# Patient Record
Sex: MALE | Race: WHITE | NOT HISPANIC OR LATINO | Employment: OTHER | URBAN - NONMETROPOLITAN AREA
[De-identification: names, ages, dates, MRNs, and addresses within clinical notes are randomized per-mention and may not be internally consistent; named-entity substitution may affect disease eponyms.]

---

## 2019-06-26 ENCOUNTER — OFFICE VISIT (OUTPATIENT)
Dept: INTERNAL MEDICINE | Facility: CLINIC | Age: 79
End: 2019-06-26

## 2019-06-26 VITALS
HEIGHT: 70 IN | HEART RATE: 63 BPM | DIASTOLIC BLOOD PRESSURE: 72 MMHG | TEMPERATURE: 98.3 F | OXYGEN SATURATION: 98 % | RESPIRATION RATE: 16 BRPM | BODY MASS INDEX: 27.92 KG/M2 | WEIGHT: 195 LBS | SYSTOLIC BLOOD PRESSURE: 126 MMHG

## 2019-06-26 DIAGNOSIS — E78.2 MIXED HYPERLIPIDEMIA: ICD-10-CM

## 2019-06-26 DIAGNOSIS — M54.50 LUMBOSACRAL PAIN: ICD-10-CM

## 2019-06-26 DIAGNOSIS — M25.50 ARTHRALGIA OF MULTIPLE JOINTS: ICD-10-CM

## 2019-06-26 DIAGNOSIS — I10 BENIGN ESSENTIAL HYPERTENSION: Primary | ICD-10-CM

## 2019-06-26 PROCEDURE — 99204 OFFICE O/P NEW MOD 45 MIN: CPT | Performed by: INTERNAL MEDICINE

## 2019-06-26 RX ORDER — SIMVASTATIN 40 MG
0.5 TABLET ORAL DAILY
COMMUNITY
Start: 2019-04-04 | End: 2019-12-04 | Stop reason: SDUPTHER

## 2019-06-26 RX ORDER — LISINOPRIL 40 MG/1
1 TABLET ORAL DAILY
COMMUNITY
Start: 2019-06-14 | End: 2019-12-18 | Stop reason: SDUPTHER

## 2019-06-26 NOTE — PROGRESS NOTES
Subjective   Ted Saldivar is a 79 y.o. male.     Chief Complaint   Patient presents with   • Establish Care     refill medications   • Hypertension   • Hyperlipidemia   • Joint Pain   • Back Pain       History of Present Illness   HPI: Patient is here for an initial visit, he is also here to follow up on the blood pressure  The patient is taking the blood pressure medications as prescribed and has had no side effects. The patient is also here to follow up on the cholesterol and is trying to follow a diet. The patient is   due to get lab work done .  The patient also needs refills on medications .  Patient complains of chronic pain in his hands which progressively gets worse during the day, he also complains of morning stiffness. he also complains of pain which is chronic in his lower back area  Hyperlipidemia   Pertinent negatives include no chest pain or shortness of breath.   Hypertension   Pertinent negatives include no chest pain, palpitations or shortness of breath.      Review of Systems   Constitutional: Negative for appetite change, fatigue and fever.   HENT: Negative for congestion, ear discharge, ear pain, sinus pressure and sore throat.    Eyes: Negative for pain and discharge.   Respiratory: Negative for cough, shortness of breath and wheezing.    Cardiovascular: Negative for chest pain, palpitations and leg swelling.   Gastrointestinal: Negative for abdominal pain, constipation, diarrhea, nausea and vomiting.   Endocrine: Negative for cold intolerance and heat intolerance.   Genitourinary: Negative for dysuria and flank pain.   Musculoskeletal: Positive for arthralgias and back pain. Negative for joint swelling.   Skin: Negative for pallor and rash.   Allergic/Immunologic: Negative for environmental allergies and food allergies.   Neurological: Negative for dizziness, weakness and numbness.   Hematological: Negative for adenopathy. Does not bruise/bleed easily.   Psychiatric/Behavioral: Negative for  "behavioral problems and dysphoric mood. The patient is not nervous/anxious.        Past Medical History:   Diagnosis Date   • Arthritis    • Hyperlipidemia    • Hypertension        Past Surgical History:   Procedure Laterality Date   • HERNIA REPAIR Bilateral 1983   • ROTATOR CUFF REPAIR Right 2006   • TOTAL HIP ARTHROPLASTY Left 11/2018       Family History   Problem Relation Age of Onset   • Cancer Mother         lung   • Cancer Sister         leukemia        reports that he has never smoked. He has never used smokeless tobacco. He reports that he drinks alcohol. He reports that he does not use drugs.    No Known Allergies        Current Outpatient Medications:   •  lisinopril (PRINIVIL,ZESTRIL) 40 MG tablet, Take 1 tablet by mouth Daily., Disp: , Rfl:   •  simvastatin (ZOCOR) 40 MG tablet, Take 0.5 tablets by mouth Daily., Disp: , Rfl:       Objective   Blood pressure 126/72, pulse 63, temperature 98.3 °F (36.8 °C), resp. rate 16, height 177.8 cm (70\"), weight 88.5 kg (195 lb), SpO2 98 %.    Physical Exam   Constitutional: He is oriented to person, place, and time. He appears well-developed and well-nourished. No distress.   HENT:   Head: Normocephalic and atraumatic.   Right Ear: External ear normal.   Left Ear: External ear normal.   Nose: Nose normal.   Mouth/Throat: Oropharynx is clear and moist.   Eyes: Conjunctivae and EOM are normal. Pupils are equal, round, and reactive to light.   Neck: Normal range of motion. Neck supple. No thyromegaly present.   Cardiovascular: Normal rate, regular rhythm and normal heart sounds.   Pulmonary/Chest: Effort normal. No respiratory distress.   Abdominal: Soft. He exhibits no distension. There is no tenderness. There is no guarding.   Musculoskeletal: He exhibits tenderness and deformity. He exhibits no edema.   Lumbar pain   Lymphadenopathy:     He has no cervical adenopathy.   Neurological: He is alert and oriented to person, place, and time.   No gross motor or sensory " deficits   Skin: Skin is warm and dry. He is not diaphoretic. No erythema.   Psychiatric: He has a normal mood and affect.   Nursing note and vitals reviewed.      Patient's Body mass index is 27.98 kg/m². BMI is within normal parameters. No follow-up required..      No results found for this or any previous visit.      Assessment/Plan   Ted was seen today for establish care, hypertension, hyperlipidemia, joint pain and back pain.    Diagnoses and all orders for this visit:    Benign essential hypertension    Mixed hyperlipidemia  -     CBC & Differential  -     Comprehensive Metabolic Panel  -     Lipid Panel    Arthralgia of multiple joints  -     Ambulatory Referral to Rheumatology    Lumbosacral pain        Plan:  1.  Benign essential hypertension: Will continue current medication, low-sodium diet advised  2.mixed hyperlipidemia: will obtain   fasting CMP and lipid panel.  Diet and exercise counseled,  Will continue current medications  3.  multiple joint pain : Refer patient to rheumatology  4. Lumbosacral pain : We will monitor       Sheila Natarajan MD

## 2019-07-01 ENCOUNTER — OFFICE VISIT (OUTPATIENT)
Dept: INTERNAL MEDICINE | Facility: CLINIC | Age: 79
End: 2019-07-01

## 2019-07-01 ENCOUNTER — HOSPITAL ENCOUNTER (OUTPATIENT)
Dept: ULTRASOUND IMAGING | Facility: HOSPITAL | Age: 79
Discharge: HOME | End: 2019-07-01
Admitting: PHYSICIAN ASSISTANT

## 2019-07-01 VITALS
HEART RATE: 73 BPM | OXYGEN SATURATION: 97 % | WEIGHT: 194 LBS | HEIGHT: 70 IN | TEMPERATURE: 98.6 F | SYSTOLIC BLOOD PRESSURE: 136 MMHG | DIASTOLIC BLOOD PRESSURE: 80 MMHG | BODY MASS INDEX: 27.77 KG/M2

## 2019-07-01 DIAGNOSIS — M25.562 MEDIAL KNEE PAIN, LEFT: Primary | ICD-10-CM

## 2019-07-01 DIAGNOSIS — M25.562 MEDIAL KNEE PAIN, LEFT: ICD-10-CM

## 2019-07-01 DIAGNOSIS — M25.562 POSTERIOR LEFT KNEE PAIN: ICD-10-CM

## 2019-07-01 DIAGNOSIS — M25.562 POSTERIOR LEFT KNEE PAIN: Primary | ICD-10-CM

## 2019-07-01 DIAGNOSIS — R60.0 LOWER LEG EDEMA: ICD-10-CM

## 2019-07-01 PROBLEM — D49.2 NEOPLASM OF SKIN: Status: ACTIVE | Noted: 2018-04-18

## 2019-07-01 LAB
ALBUMIN SERPL-MCNC: 4.2 G/DL (ref 3.5–5)
ALBUMIN/GLOB SERPL: 1.4 G/DL (ref 1–2)
ALP SERPL-CCNC: 71 U/L (ref 38–126)
ALT SERPL-CCNC: 25 U/L (ref 13–69)
AST SERPL-CCNC: 22 U/L (ref 15–46)
BASOPHILS # BLD AUTO: 0.05 10*3/MM3 (ref 0–0.2)
BASOPHILS NFR BLD AUTO: 0.7 % (ref 0–1.5)
BILIRUB SERPL-MCNC: 0.6 MG/DL (ref 0.2–1.3)
BUN SERPL-MCNC: 22 MG/DL (ref 7–20)
BUN/CREAT SERPL: 24.4 (ref 6.3–21.9)
CALCIUM SERPL-MCNC: 9.4 MG/DL (ref 8.4–10.2)
CHLORIDE SERPL-SCNC: 103 MMOL/L (ref 98–107)
CHOLEST SERPL-MCNC: 161 MG/DL (ref 0–199)
CO2 SERPL-SCNC: 30 MMOL/L (ref 26–30)
CREAT SERPL-MCNC: 0.9 MG/DL (ref 0.6–1.3)
EOSINOPHIL # BLD AUTO: 0.14 10*3/MM3 (ref 0–0.4)
EOSINOPHIL NFR BLD AUTO: 2 % (ref 0.3–6.2)
ERYTHROCYTE [DISTWIDTH] IN BLOOD BY AUTOMATED COUNT: 14.8 % (ref 12.3–15.4)
GLOBULIN SER CALC-MCNC: 3 GM/DL
GLUCOSE SERPL-MCNC: 92 MG/DL (ref 74–98)
HCT VFR BLD AUTO: 40.9 % (ref 37.5–51)
HDLC SERPL-MCNC: 48 MG/DL (ref 40–60)
HGB BLD-MCNC: 13.6 G/DL (ref 13–17.7)
IMM GRANULOCYTES # BLD AUTO: 0.04 10*3/MM3 (ref 0–0.05)
IMM GRANULOCYTES NFR BLD AUTO: 0.6 % (ref 0–0.5)
LDLC SERPL CALC-MCNC: 96 MG/DL (ref 0–99)
LYMPHOCYTES # BLD AUTO: 1.45 10*3/MM3 (ref 0.7–3.1)
LYMPHOCYTES NFR BLD AUTO: 21 % (ref 19.6–45.3)
MCH RBC QN AUTO: 31.3 PG (ref 26.6–33)
MCHC RBC AUTO-ENTMCNC: 33.3 G/DL (ref 31.5–35.7)
MCV RBC AUTO: 94.2 FL (ref 79–97)
MONOCYTES # BLD AUTO: 0.61 10*3/MM3 (ref 0.1–0.9)
MONOCYTES NFR BLD AUTO: 8.8 % (ref 5–12)
NEUTROPHILS # BLD AUTO: 4.61 10*3/MM3 (ref 1.7–7)
NEUTROPHILS NFR BLD AUTO: 66.9 % (ref 42.7–76)
NRBC BLD AUTO-RTO: 0 /100 WBC (ref 0–0.2)
PLATELET # BLD AUTO: 256 10*3/MM3 (ref 140–450)
POTASSIUM SERPL-SCNC: 5.4 MMOL/L (ref 3.5–5.1)
PROT SERPL-MCNC: 7.2 G/DL (ref 6.3–8.2)
RBC # BLD AUTO: 4.34 10*6/MM3 (ref 4.14–5.8)
SODIUM SERPL-SCNC: 141 MMOL/L (ref 137–145)
TRIGL SERPL-MCNC: 87 MG/DL
VLDLC SERPL CALC-MCNC: 17.4 MG/DL
WBC # BLD AUTO: 6.9 10*3/MM3 (ref 3.4–10.8)

## 2019-07-01 PROCEDURE — 99213 OFFICE O/P EST LOW 20 MIN: CPT | Performed by: PHYSICIAN ASSISTANT

## 2019-07-01 PROCEDURE — 93971 EXTREMITY STUDY: CPT

## 2019-07-01 NOTE — PROGRESS NOTES
Ted Saldivar is a 79 y.o. male.     Subjective   History of Present Illness   Here today for concern of acute left knee pain for the last week.  No known injury, he just began hurting when he stood up from the table when the evening.  The pain got a little better after a few days then worsened again without known cause.  He endorses some swelling, redness and warmth.  The pain is worse medially but also bothersome posteriorly.  The knee hurts to bend which triggers sharp and stabbing pain.   Pain goes away for a few minutes if he is still but returns quickly.  No history of gout.  Pain is interrupting sleep.  No previous similar issues.  Tylenol does not help.   Ice helped a little for a few minutes.  No recent illness, fever or chills.  No lower leg swelling.  He returned from driving to [place] around 1 week ago at which time they stopped every 2 hours to walk around.  No CP or SOA.  No history of DVT or PE.  No anticoagulation.           The following portions of the patient's history were reviewed and updated as appropriate: allergies, current medications, past family history, past medical history, past social history, past surgical history and problem list.    Review of Systems   Constitutional: Negative for appetite change, chills, diaphoresis, fatigue, fever and unexpected weight change.   Eyes: Negative for visual disturbance.   Respiratory: Negative for cough, chest tightness, shortness of breath, wheezing and stridor.    Cardiovascular: Negative for chest pain, palpitations and leg swelling.   Musculoskeletal: Positive for arthralgias, gait problem and joint swelling. Negative for back pain and neck pain.   Skin: Positive for color change. Negative for pallor, rash and wound.        Abnormal warmth.   Neurological: Negative for dizziness, tremors, seizures, speech difficulty and headaches.   Hematological: Negative for adenopathy. Does not bruise/bleed easily.   Psychiatric/Behavioral: Positive for sleep  "disturbance. Negative for agitation, confusion, dysphoric mood, self-injury and suicidal ideas. The patient is not nervous/anxious.          Objective    Physical Exam   Musculoskeletal:        Left knee: He exhibits decreased range of motion, swelling and erythema. He exhibits no effusion, no ecchymosis, no deformity, no laceration, normal alignment, no LCL laxity, normal patellar mobility, no bony tenderness, normal meniscus and no MCL laxity. Tenderness found. Medial joint line tenderness noted. No lateral joint line, no MCL, no LCL and no patellar tendon tenderness noted.        Left upper leg: He exhibits no tenderness.        Left lower leg: He exhibits tenderness.        Legs:        /80   Pulse 73   Temp 98.6 °F (37 °C)   Ht 177.8 cm (70\")   Wt 88 kg (194 lb)   SpO2 97%   BMI 27.84 kg/m²     Nursing note and vitals reviewed.          Assessment/Plan   Ted was seen today for knee pain.    Diagnoses and all orders for this visit:    Posterior left knee pain  -     US venous doppler lower extremity left (duplex)    Lower leg edema  -     US venous doppler lower extremity left (duplex)    Medial knee pain, left  -     US venous doppler lower extremity left (duplex)      Will first rule out DVT with imaging. If negative, he has been advised to try Aleve or ibuprofen prn with food.  May refer to orthopedic surgery for consultation.         "

## 2019-07-02 ENCOUNTER — HOSPITAL ENCOUNTER (OUTPATIENT)
Dept: ULTRASOUND IMAGING | Facility: HOSPITAL | Age: 79
End: 2019-07-02

## 2019-07-11 ENCOUNTER — HOSPITAL ENCOUNTER (OUTPATIENT)
Dept: GENERAL RADIOLOGY | Facility: HOSPITAL | Age: 79
Discharge: HOME | End: 2019-07-11
Admitting: INTERNAL MEDICINE

## 2019-07-11 ENCOUNTER — HOSPITAL ENCOUNTER (OUTPATIENT)
Dept: GENERAL RADIOLOGY | Facility: HOSPITAL | Age: 79
Discharge: HOME | End: 2019-07-11

## 2019-07-11 ENCOUNTER — TRANSCRIBE ORDERS (OUTPATIENT)
Dept: GENERAL RADIOLOGY | Facility: HOSPITAL | Age: 79
End: 2019-07-11

## 2019-07-11 DIAGNOSIS — M25.561 PAIN IN BOTH KNEES, UNSPECIFIED CHRONICITY: ICD-10-CM

## 2019-07-11 DIAGNOSIS — M13.0 POLYARTHRITIS: ICD-10-CM

## 2019-07-11 DIAGNOSIS — M79.642 PAIN IN BOTH HANDS: ICD-10-CM

## 2019-07-11 DIAGNOSIS — M54.5 LOW BACK PAIN, UNSPECIFIED BACK PAIN LATERALITY, UNSPECIFIED CHRONICITY, WITH SCIATICA PRESENCE UNSPECIFIED: ICD-10-CM

## 2019-07-11 DIAGNOSIS — M79.641 PAIN IN BOTH HANDS: ICD-10-CM

## 2019-07-11 DIAGNOSIS — M15.0 PRIMARY GENERALIZED (OSTEO)ARTHRITIS: ICD-10-CM

## 2019-07-11 DIAGNOSIS — M54.5 LOW BACK PAIN, UNSPECIFIED BACK PAIN LATERALITY, UNSPECIFIED CHRONICITY, WITH SCIATICA PRESENCE UNSPECIFIED: Primary | ICD-10-CM

## 2019-07-11 DIAGNOSIS — M25.562 PAIN IN BOTH KNEES, UNSPECIFIED CHRONICITY: ICD-10-CM

## 2019-07-11 PROCEDURE — 73120 X-RAY EXAM OF HAND: CPT

## 2019-07-11 PROCEDURE — 73562 X-RAY EXAM OF KNEE 3: CPT

## 2019-08-22 ENCOUNTER — TELEPHONE (OUTPATIENT)
Dept: INTERNAL MEDICINE | Facility: CLINIC | Age: 79
End: 2019-08-22

## 2019-08-22 DIAGNOSIS — M25.50 ARTHRALGIA OF MULTIPLE JOINTS: Primary | ICD-10-CM

## 2019-08-22 NOTE — TELEPHONE ENCOUNTER
We received a voicemail message in referrals asking about getting a new referral to Rheumatology.  Patient would like to see Dr. Portillo at the [facility].  There is a previous referral to Rheumatology, but it was used to schedule with Dr. Esparza, which patient did attend that appointment.

## 2019-10-31 ENCOUNTER — OFFICE VISIT (OUTPATIENT)
Dept: INTERNAL MEDICINE | Facility: CLINIC | Age: 79
End: 2019-10-31

## 2019-10-31 VITALS
HEIGHT: 70 IN | OXYGEN SATURATION: 98 % | DIASTOLIC BLOOD PRESSURE: 84 MMHG | HEART RATE: 57 BPM | RESPIRATION RATE: 16 BRPM | WEIGHT: 195 LBS | BODY MASS INDEX: 27.92 KG/M2 | SYSTOLIC BLOOD PRESSURE: 147 MMHG | TEMPERATURE: 98.4 F

## 2019-10-31 DIAGNOSIS — E78.2 MIXED HYPERLIPIDEMIA: ICD-10-CM

## 2019-10-31 DIAGNOSIS — M25.50 ARTHRALGIA OF MULTIPLE JOINTS: ICD-10-CM

## 2019-10-31 DIAGNOSIS — I10 BENIGN ESSENTIAL HYPERTENSION: Primary | ICD-10-CM

## 2019-10-31 PROCEDURE — 99214 OFFICE O/P EST MOD 30 MIN: CPT | Performed by: INTERNAL MEDICINE

## 2019-10-31 RX ORDER — FOLIC ACID 1 MG/1
1 TABLET ORAL DAILY
COMMUNITY
End: 2020-07-17 | Stop reason: SDUPTHER

## 2019-10-31 RX ORDER — MECLIZINE HYDROCHLORIDE 25 MG/1
25 TABLET ORAL 3 TIMES DAILY PRN
Qty: 90 TABLET | Refills: 1 | Status: SHIPPED | OUTPATIENT
Start: 2019-10-31

## 2019-11-05 LAB
ALBUMIN SERPL-MCNC: 4.3 G/DL (ref 3.5–5.2)
ALBUMIN/GLOB SERPL: 1.7 G/DL
ALP SERPL-CCNC: 81 U/L (ref 39–117)
ALT SERPL-CCNC: 12 U/L (ref 1–41)
AST SERPL-CCNC: 16 U/L (ref 1–40)
BASOPHILS # BLD AUTO: (no result) 10*3/UL
BASOPHILS # BLD MANUAL: 0.21 10*3/MM3 (ref 0–0.2)
BASOPHILS NFR BLD MANUAL: 3 % (ref 0–1.5)
BILIRUB SERPL-MCNC: 0.6 MG/DL (ref 0.2–1.2)
BUN SERPL-MCNC: 28 MG/DL (ref 8–23)
BUN/CREAT SERPL: 26.2 (ref 7–25)
CALCIUM SERPL-MCNC: 9.3 MG/DL (ref 8.6–10.5)
CHLORIDE SERPL-SCNC: 106 MMOL/L (ref 98–107)
CHOLEST SERPL-MCNC: 153 MG/DL (ref 0–200)
CO2 SERPL-SCNC: 27.6 MMOL/L (ref 22–29)
CREAT SERPL-MCNC: 1.07 MG/DL (ref 0.76–1.27)
DIFFERENTIAL COMMENT: ABNORMAL
EOSINOPHIL # BLD AUTO: (no result) 10*3/UL
EOSINOPHIL # BLD MANUAL: 0.14 10*3/MM3 (ref 0–0.4)
EOSINOPHIL NFR BLD AUTO: (no result) %
EOSINOPHIL NFR BLD MANUAL: 2 % (ref 0.3–6.2)
ERYTHROCYTE [DISTWIDTH] IN BLOOD BY AUTOMATED COUNT: 14.5 % (ref 12.3–15.4)
GLOBULIN SER CALC-MCNC: 2.6 GM/DL
GLUCOSE SERPL-MCNC: 93 MG/DL (ref 65–99)
HCT VFR BLD AUTO: 41.5 % (ref 37.5–51)
HDLC SERPL-MCNC: 47 MG/DL (ref 40–60)
HGB BLD-MCNC: 13.2 G/DL (ref 13–17.7)
LDLC SERPL CALC-MCNC: 92 MG/DL (ref 0–100)
LYMPHOCYTES # BLD AUTO: (no result) 10*3/UL
LYMPHOCYTES # BLD MANUAL: 1.23 10*3/MM3 (ref 0.7–3.1)
LYMPHOCYTES NFR BLD AUTO: (no result) %
LYMPHOCYTES NFR BLD MANUAL: 18 % (ref 19.6–45.3)
MCH RBC QN AUTO: 30.5 PG (ref 26.6–33)
MCHC RBC AUTO-ENTMCNC: 31.8 G/DL (ref 31.5–35.7)
MCV RBC AUTO: 95.8 FL (ref 79–97)
MONOCYTES # BLD MANUAL: 0.14 10*3/MM3 (ref 0.1–0.9)
MONOCYTES NFR BLD AUTO: (no result) %
MONOCYTES NFR BLD MANUAL: 2 % (ref 5–12)
NEUTROPHILS # BLD MANUAL: 5.13 10*3/MM3 (ref 1.7–7)
NEUTROPHILS NFR BLD AUTO: (no result) %
NEUTROPHILS NFR BLD MANUAL: 75 % (ref 42.7–76)
PLATELET # BLD AUTO: 291 10*3/MM3 (ref 140–450)
PLATELET BLD QL SMEAR: ABNORMAL
POTASSIUM SERPL-SCNC: 4.6 MMOL/L (ref 3.5–5.2)
PROT SERPL-MCNC: 6.9 G/DL (ref 6–8.5)
RBC # BLD AUTO: 4.33 10*6/MM3 (ref 4.14–5.8)
RBC MORPH BLD: ABNORMAL
SODIUM SERPL-SCNC: 144 MMOL/L (ref 136–145)
TRIGL SERPL-MCNC: 68 MG/DL (ref 0–150)
VLDLC SERPL CALC-MCNC: 13.6 MG/DL
WBC # BLD AUTO: 6.84 10*3/MM3 (ref 3.4–10.8)

## 2019-12-04 ENCOUNTER — OFFICE VISIT (OUTPATIENT)
Dept: INTERNAL MEDICINE | Facility: CLINIC | Age: 79
End: 2019-12-04

## 2019-12-04 VITALS
SYSTOLIC BLOOD PRESSURE: 138 MMHG | HEART RATE: 58 BPM | WEIGHT: 197 LBS | HEIGHT: 70 IN | TEMPERATURE: 98 F | OXYGEN SATURATION: 99 % | RESPIRATION RATE: 16 BRPM | DIASTOLIC BLOOD PRESSURE: 83 MMHG | BODY MASS INDEX: 28.2 KG/M2

## 2019-12-04 DIAGNOSIS — E78.2 MIXED HYPERLIPIDEMIA: ICD-10-CM

## 2019-12-04 DIAGNOSIS — Z23 NEED FOR VACCINATION: ICD-10-CM

## 2019-12-04 DIAGNOSIS — I10 BENIGN ESSENTIAL HYPERTENSION: ICD-10-CM

## 2019-12-04 DIAGNOSIS — Z00.00 ROUTINE GENERAL MEDICAL EXAMINATION AT A HEALTH CARE FACILITY: Primary | ICD-10-CM

## 2019-12-04 PROCEDURE — G0009 ADMIN PNEUMOCOCCAL VACCINE: HCPCS | Performed by: INTERNAL MEDICINE

## 2019-12-04 PROCEDURE — G0439 PPPS, SUBSEQ VISIT: HCPCS | Performed by: INTERNAL MEDICINE

## 2019-12-04 PROCEDURE — 90732 PPSV23 VACC 2 YRS+ SUBQ/IM: CPT | Performed by: INTERNAL MEDICINE

## 2019-12-04 RX ORDER — SIMVASTATIN 10 MG
10 TABLET ORAL NIGHTLY
Qty: 90 TABLET | Refills: 3 | Status: SHIPPED | OUTPATIENT
Start: 2019-12-04 | End: 2020-07-17 | Stop reason: SDUPTHER

## 2019-12-04 NOTE — PATIENT INSTRUCTIONS
Hacer ejercicio para mantenerse sai  Exercising to Stay Healthy  Para estar sai y mantenerse así, es recomendable hacer ejercicio de intensidad moderada y de intensidad vigorosa. Puede saber si está haciendo ejercicio de intensidad moderada si kolb corazón comienza a latir más rápido y kolb respiración se vuelve más rápida, mahin aún puede mantener madison conversación. Puede saber que está haciendo ejercicio de intensidad vigorosa si respira con mucha más dificultad y rapidez, y no puede mantener madison conversación.  Hacer actividad física con regularidad es muy importante. Tiene muchos otros beneficios, brittani por ejemplo:  · Mejora el estado físico general, la flexibilidad y la resistencia.  · Aumenta la densidad ósea.  · Ayuda a controlar el peso.  · Disminuye la grasa corporal.  · Aumenta la fuerza muscular.  · Reduce el estrés y las tensiones.  · Mejora el estado de tiffanie general.  ¿Con qué frecuencia debería hacer ejercicio?  Elija madison actividad que disfrute y establezca objetivos realistas. El médico puede ayudarlo a elaborar un plan de actividades que funcione para usted.  Garret actividad física habitualmente brittani se lo haya indicado el médico. [place] puede incluir lo siguiente:  · Hacer ejercicios de fortalecimiento muscular dos veces a la semana, brittani:  ? Levantamiento de pesas.  ? Usar bandas elásticas de resistencia.  ? Flexiones de brazos.  ? Abdominales.  ? Yoga.  · Realizar ejercicio de madison cierta intensidad con madison cantidad determinada de tiempo. Elija entre estas opciones:  ? Un total de 150 minutos de ejercicio de intensidad moderada cada semana.  ? Un total de 75 minutos de ejercicio de intensidad vigorosa cada semana.  ? Madison mezcla de ejercicio de intensidad moderada y vigorosa cada semana.  Los niños, las mujeres embarazadas, las personas que no guy hecho actividad física con regularidad, las personas que tienen sobrepeso y los adultos mayores salvador vez tengan que consultar a un médico sobre qué  actividades son seguras para hacer. Si tiene alguna enfermedad, asegúrese de consultar al médico antes de comenzar un programa de ejercicios nuevo.  ¿Cuáles son algunas ideas para hacer ejercicio?  Algunas ideas de ejercicio de intensidad moderada incluyen:  · Caminar 1 dax (1.6 kilómetros) en aproximadamente 15 minutos.  · Andar en bicicleta.  · Practicar senderismo.  · Jugar al golf.  · Bailar.  · Gimnasia acuática.  Algunas ideas de ejercicio de intensidad vigorosa incluyen:  · Caminar 4.5 millas (7.2 kilómetros) o más en aproximadamente madison hora.  · Trotar o correr 5 millas (8 kilómetros) en aproximadamente madison hora.  · Andar en bicicleta 10 millas (16,1 kilómetros) o más en aproximadamente madison hora.  · Practicar natación.  · Practicar patinaje de marcelino o en línea.  · Hacer esquí de fondo.  · Hacer deportes competitivos vigorosos, brittani fútbol americano, básquet y fútbol.  · Saltar la cuerda.  · Davis clases de baile aeróbico.  ¿Cuáles son algunas actividades diarias que pueden ayudarme a ejercitarme?  · Trabajar en el jardín, brittani:  ? Empujar madison cortadora de césped.  ? Juntar y embolsar hojas.  · Julian el automóvil.  · Empujar un cochecito.  · Palear trish.  · Cuidar el jardín.  · Julian las ventanas o los pisos.  ¿Cómo puedo ser más activo en mis actividades diarias?  · Utilice las escaleras en lugar del ascensor.  · Vaya a caminar con kolb hora de almuerzo.  · Si conduce, estacione el automóvil más lejos del trabajo o de la escuela.  · Si usa transporte público, bájese madison dobbs antes y camine el chidi del meron.  · Póngase de pie o camine con todas las llamadas telefónicas que garret mientras está adentro.  · Levántese, estírese y camine cada 30 minutos a lo antonino del día.  · Garret ejercicio con un amigo. El apoyo para continuar haciendo ejercicio lo ayudará a mantener madison rutina de actividad frecuente.  ¿Qué pautas puedo seguir mientras hago ejercicio?  · Hable con el médico antes de comenzar un  programa nuevo de actividad física.  · No garret ejercicio en exceso que pudiera hacer que se lastime, se sienta mareado o tenga dificultad para respirar.  · Use ropa cómoda y calzado con buen soporte.  · Karrie gran cantidad de agua mientras hace ejercicio para evitar la deshidratación o los golpes de calor.  · Garret ejercicio hasta que se aceleren kolb respiración y dioni latidos cardíacos.  Dónde encontrar más información  · Departamento de Tiffanie y Servicios Humanos de los Estados Unidos (U.S. Department of Health and Human Services): www.hhs.gov  · Centros para el Control y la Prevención de Enfermedades (Centers for Disease Control and Prevention, CDC): www.cdc.gov  Resumen  · Hacer actividad física con regularidad es muy importante. Mejorará el estado físico general, la flexibilidad y la resistencia.  · El ejercicio regular también mejora la tiffanie general. Puede ayudarlo a controlar kolb peso, reducir el estrés y mejorar la densidad ósea.  · No garret ejercicio en exceso que pudiera hacer que se lastime, se sienta mareado o tenga dificultad para respirar.  · Hable con el médico antes de comenzar un programa nuevo de actividad física.  Esta información no tiene brittani fin reemplazar el consejo del médico. Asegúrese de hacerle al médico cualquier pregunta que tenga.  Document Released: 2012 Document Revised: 2019 Document Reviewed: 2019  Elsevier Interactive Patient Education © 2019 [employer]    Medicare Wellness  Personal Prevention Plan of Service     Date of Office Visit:  2019  Encounter Provider:  Sheila Natarajan MD  Place of Service:  [facility]  Patient Name: Ted Saldivar  :  1940    As part of the Medicare Wellness portion of your visit today, we are providing you with this personalized preventive plan of services (PPPS). This plan is based upon recommendations of the [place] Preventive Services Task Force (USPSTF) and the Advisory Committee on  Immunization Practices (ACIP).    This lists the preventive care services that should be considered, and provides dates of when you are due. Items listed as completed are up-to-date and do not require any further intervention.    Health Maintenance   Topic Date Due   • ZOSTER VACCINE (2 of 2) 11/26/2016   • PNEUMOCOCCAL VACCINES (65+ LOW/MEDIUM RISK) (2 of 2 - PCV13) 10/01/2018   • MEDICARE ANNUAL WELLNESS  06/26/2019   • LIPID PANEL  11/05/2020   • TDAP/TD VACCINES (2 - Td) 07/27/2029   • INFLUENZA VACCINE  Completed       Orders Placed This Encounter   Procedures   • Comprehensive Metabolic Panel   • Lipid Panel   • CBC & Differential     Order Specific Question:   Manual Differential     Answer:   No       Return in 1 year (on 12/4/2020) for Medicare Wellness.

## 2019-12-04 NOTE — PROGRESS NOTES
The ABCs of the Annual Wellness Visit  Subsequent Medicare Wellness Visit    Chief Complaint   Patient presents with   • Medicare Wellness-subsequent   • Hypertension   • Hyperlipidemia       Subjective   History of Present Illness:  Ted Saldivar is a 79 y.o. male who presents for a Subsequent Medicare Wellness Visit. Patient is here to follow up on the blood pressure  The patient is taking the blood pressure medications as prescribed and has had no side effects. The patient is also here to follow up on the cholesterol and is trying to follow a diet. The patient is   due to get lab work done .  The patient also needs refills on medications .  He needs pneumovax   Hyperlipidemia   Pertinent negatives include no chest pain or shortness of breath.   Hypertension   Pertinent negatives include no chest pain, palpitations or shortness of breath.      HEALTH RISK ASSESSMENT    Recent Hospitalizations:  No hospitalization(s) within the last year.    Current Medical Providers:  Patient Care Team:  Sheila Natarjaan MD as PCP - General (Internal Medicine)    Smoking Status:  Social History     Tobacco Use   Smoking Status Never Smoker   Smokeless Tobacco Never Used       Alcohol Consumption:  Social History     Substance and Sexual Activity   Alcohol Use Yes   • Frequency: Monthly or less    Comment: SOCIALLY       Depression Screen:   PHQ-2/PHQ-9 Depression Screening 12/4/2019   Little interest or pleasure in doing things 0   Feeling down, depressed, or hopeless 0   Total Score 0       Fall Risk Screen:  KATHYADI Fall Risk Assessment was completed, and patient is at LOW risk for falls.Assessment completed on:12/4/2019    Health Habits and Functional and Cognitive Screening:  Functional & Cognitive Status 12/4/2019   Do you have difficulty preparing food and eating? No   Do you have difficulty bathing yourself, getting dressed or grooming yourself? No   Do you have difficulty using the toilet? No   Do you have difficulty moving  around from place to place? No   Do you have trouble with steps or getting out of a bed or a chair? No   Current Diet Well Balanced Diet   Dental Exam Up to date   Eye Exam Up to date   Exercise (times per week) 6 times per week   Current Exercise Activities Include Housecleaning   Do you need help using the phone?  No   Are you deaf or do you have serious difficulty hearing?  No   Do you need help with transportation? No   Do you need help shopping? No   Do you need help preparing meals?  No   Do you need help with housework?  No   Do you need help with laundry? No   Do you need help taking your medications? No   Do you need help managing money? No   Do you ever drive or ride in a car without wearing a seat belt? No   Have you felt unusual stress, anger or loneliness in the last month? No   Who do you live with? Spouse   If you need help, do you have trouble finding someone available to you? No   Have you been bothered in the last four weeks by sexual problems? No   Do you have difficulty concentrating, remembering or making decisions? No         Does the patient have evidence of cognitive impairment? No    Asprin use counseling:Does not need ASA (and currently is not on it)    Age-appropriate Screening Schedule:  Refer to the list below for future screening recommendations based on patient's age, sex and/or medical conditions. Orders for these recommended tests are listed in the plan section. The patient has been provided with a written plan.    Health Maintenance   Topic Date Due   • ZOSTER VACCINE (2 of 2) 11/26/2016   • LIPID PANEL  11/05/2020   • TDAP/TD VACCINES (2 - Td) 07/27/2029   • INFLUENZA VACCINE  Completed   • PNEUMOCOCCAL VACCINES (65+ LOW/MEDIUM RISK)  Completed          The following portions of the patient's history were reviewed and updated as appropriate: allergies, current medications, past family history, past medical history, past social history, past surgical history and problem  list.    Outpatient Medications Prior to Visit   Medication Sig Dispense Refill   • folic acid (FOLVITE) 1 MG tablet Take 1 mg by mouth Daily.     • lisinopril (PRINIVIL,ZESTRIL) 40 MG tablet Take 1 tablet by mouth Daily.     • meclizine (ANTIVERT) 25 MG tablet Take 1 tablet by mouth 3 (Three) Times a Day As Needed for dizziness. 90 tablet 1   • methotrexate 2.5 MG tablet Take 6 tablets by mouth 1 (One) Time. On thursday     • simvastatin (ZOCOR) 40 MG tablet Take 0.5 tablets by mouth Daily.       No facility-administered medications prior to visit.        Patient Active Problem List   Diagnosis   • Neoplasm of skin       Advanced Care Planning:  Patient has an advance directive - a copy has not been provided. Have asked the patient to send this to us to add to record    Review of Systems   Constitutional: Negative for appetite change, fatigue and fever.   HENT: Negative for congestion, ear discharge, ear pain, sinus pressure and sore throat.    Eyes: Negative for pain and discharge.   Respiratory: Negative for cough, shortness of breath and wheezing.    Cardiovascular: Negative for chest pain, palpitations and leg swelling.   Gastrointestinal: Negative for abdominal pain, constipation, diarrhea, nausea and vomiting.   Endocrine: Negative for cold intolerance and heat intolerance.   Genitourinary: Negative for dysuria and flank pain.   Musculoskeletal: Negative for arthralgias and joint swelling.   Skin: Negative for pallor and rash.   Allergic/Immunologic: Negative for environmental allergies and food allergies.   Neurological: Negative for dizziness, weakness and numbness.   Hematological: Negative for adenopathy. Does not bruise/bleed easily.   Psychiatric/Behavioral: Negative for behavioral problems and dysphoric mood. The patient is not nervous/anxious.        Compared to one year ago, the patient feels his physical health is worse.  Compared to one year ago, the patient feels his mental health is the  "same.    Reviewed chart for potential of high risk medication in the elderly: yes  Reviewed chart for potential of harmful drug interactions in the elderly:yes    Objective         Vitals:    12/04/19 0909   BP: 138/83   Pulse: 58   Resp: 16   Temp: 98 °F (36.7 °C)   SpO2: 99%   Weight: 89.4 kg (197 lb)   Height: 177.8 cm (70\")   PainSc: 0-No pain       Body mass index is 28.27 kg/m².  Discussed the patient's BMI with him. The BMI is in the acceptable range.    Physical Exam   Constitutional: He is oriented to person, place, and time. He appears well-developed and well-nourished. No distress.   HENT:   Head: Normocephalic and atraumatic.   Right Ear: External ear normal.   Left Ear: External ear normal.   Nose: Nose normal.   Mouth/Throat: Oropharynx is clear and moist.   Eyes: Conjunctivae and EOM are normal. Pupils are equal, round, and reactive to light.   Neck: Normal range of motion. Neck supple. No thyromegaly present.   Cardiovascular: Normal rate, regular rhythm and normal heart sounds.   Pulmonary/Chest: Effort normal. No respiratory distress.   Abdominal: Soft. He exhibits no distension. There is no tenderness. There is no guarding.   Musculoskeletal: Normal range of motion. He exhibits no edema.   Lymphadenopathy:     He has no cervical adenopathy.   Neurological: He is alert and oriented to person, place, and time.   No gross motor or sensory deficits   Skin: Skin is warm and dry. He is not diaphoretic. No erythema.   Psychiatric: He has a normal mood and affect.   Nursing note and vitals reviewed.      Lab Results   Component Value Date    GLU 93 11/05/2019    CHLPL 153 11/05/2019    TRIG 68 11/05/2019    HDL 47 11/05/2019    LDL 92 11/05/2019    VLDL 13.6 11/05/2019        Assessment/Plan   Medicare Risks and Personalized Health Plan  CMS Preventative Services Quick Reference  Advance Directive Discussion  Cardiovascular risk  Fall Risk  Immunizations Discussed/Encouraged (specific immunizations; " Pneumococcal 23 )    The above risks/problems have been discussed with the patient.  Pertinent information has been shared with the patient in the After Visit Summary.  Follow up plans and orders are seen below in the Assessment/Plan Section.    Diagnoses and all orders for this visit:    1. Routine general medical examination at a health care facility (Primary)    2. Benign essential hypertension    3. Mixed hyperlipidemia  -     CBC & Differential  -     Comprehensive Metabolic Panel  -     Lipid Panel    4. Need for vaccination  -     Pneumococcal Polysaccharide Vaccine 23-Valent (PPSV23) Greater Than or Equal To 3yo Subcutaneous / IM    Other orders  -     simvastatin (ZOCOR) 10 MG tablet; Take 1 tablet by mouth Every Night.  Dispense: 90 tablet; Refill: 3      Plan:  1.physical: Physical exam conducted today, reviewed fasting lab work,   Impression: healthy adult Patient. Currently, patient eats a healthy diet. Screening lab work includes glucose, lipid profile and complete blood count . The risks and benefits of immunizations were discussed and immunizations are up to date. Advice and education were given regarding nutrition and aerobic exercise. Patient discussion: discussed with the patient.  Annual Wellness Visit  with preventive exam as well as age and risk appropriate counseling completed.   Advance Directive Planning: paperwork and instructions were given to the patient.   Patient Discussion: plan discussed with the patient, follow-up visit needed in one year. Medication Review and medication list updated  2.  Benign essential hypertension: Will continue current medication, low-sodium diet advised  3.mixed hyperlipidemia: will obtain   fasting CMP and lipid panel.  Diet and exercise counseled,  Will continue current medications  4.  Need for Pneumovax: Given today    Follow Up:  Return in 1 year (on 12/4/2020) for Medicare Wellness.     An After Visit Summary and PPPS were given to the patient.

## 2019-12-09 ENCOUNTER — HOSPITAL ENCOUNTER (OUTPATIENT)
Dept: GENERAL RADIOLOGY | Facility: HOSPITAL | Age: 79
Discharge: HOME | End: 2019-12-09
Admitting: PHYSICIAN ASSISTANT

## 2019-12-09 ENCOUNTER — TRANSCRIBE ORDERS (OUTPATIENT)
Dept: GENERAL RADIOLOGY | Facility: HOSPITAL | Age: 79
End: 2019-12-09

## 2019-12-09 DIAGNOSIS — M13.0 POLYARTHRITIS: ICD-10-CM

## 2019-12-09 DIAGNOSIS — M79.642 PAIN IN BOTH HANDS: ICD-10-CM

## 2019-12-09 DIAGNOSIS — M79.641 PAIN IN BOTH HANDS: ICD-10-CM

## 2019-12-09 DIAGNOSIS — M05.79 RHEUMATOID ARTHRITIS WITH RHEUMATOID FACTOR OF MULTIPLE SITES WITHOUT ORGAN OR SYSTEMS INVOLVEMENT (HCC): ICD-10-CM

## 2019-12-09 DIAGNOSIS — M54.50 LOW BACK PAIN, UNSPECIFIED BACK PAIN LATERALITY, UNSPECIFIED CHRONICITY, UNSPECIFIED WHETHER SCIATICA PRESENT: ICD-10-CM

## 2019-12-09 DIAGNOSIS — M15.0 PRIMARY GENERALIZED (OSTEO)ARTHRITIS: ICD-10-CM

## 2019-12-09 PROCEDURE — 73120 X-RAY EXAM OF HAND: CPT

## 2019-12-18 ENCOUNTER — TELEPHONE (OUTPATIENT)
Dept: INTERNAL MEDICINE | Facility: CLINIC | Age: 79
End: 2019-12-18

## 2019-12-18 RX ORDER — LISINOPRIL 40 MG/1
40 TABLET ORAL DAILY
Qty: 90 TABLET | Refills: 2 | Status: SHIPPED | OUTPATIENT
Start: 2019-12-18 | End: 2020-07-17 | Stop reason: SDUPTHER

## 2019-12-18 NOTE — TELEPHONE ENCOUNTER
Pt requested a refill from his pharmacy. The pt's previous provider is the original prescriber, so the pharmacy wanted to check ti make sure Dr Natarajan is going to continue to fill for the pt.    lisinopril (PRINIVIL,ZESTRIL) 40 MG tablet    To Dane Pariis\

## 2020-03-04 ENCOUNTER — TELEPHONE (OUTPATIENT)
Dept: INTERNAL MEDICINE | Facility: CLINIC | Age: 80
End: 2020-03-04

## 2020-03-04 DIAGNOSIS — M25.50 ARTHRALGIA OF MULTIPLE JOINTS: Primary | ICD-10-CM

## 2020-03-04 NOTE — TELEPHONE ENCOUNTER
Patient's wife, Akiko, called the office today requesting a referral to a new Rheumatology provider for the patient.  Would like referral to Trinh Portillo at the [facility].  Thanks!

## 2020-07-16 ENCOUNTER — TELEPHONE (OUTPATIENT)
Dept: INTERNAL MEDICINE | Facility: CLINIC | Age: 80
End: 2020-07-16

## 2020-07-16 NOTE — TELEPHONE ENCOUNTER
PTS WIFE CALLED STATING A PHARMACY CHANGE REQUEST FOR [facility] HAS BEEN FAXED OVER    PLEASE ADVISE AT: [phone]

## 2020-07-17 RX ORDER — SIMVASTATIN 10 MG
10 TABLET ORAL NIGHTLY
Qty: 90 TABLET | Refills: 1 | Status: SHIPPED | OUTPATIENT
Start: 2020-07-17 | End: 2020-12-08 | Stop reason: SDUPTHER

## 2020-07-17 RX ORDER — FOLIC ACID 1 MG/1
1 TABLET ORAL DAILY
Qty: 90 TABLET | Refills: 1 | Status: SHIPPED | OUTPATIENT
Start: 2020-07-17

## 2020-07-17 RX ORDER — LISINOPRIL 40 MG/1
40 TABLET ORAL DAILY
Qty: 90 TABLET | Refills: 1 | Status: SHIPPED | OUTPATIENT
Start: 2020-07-17 | End: 2020-12-08 | Stop reason: SDUPTHER

## 2020-11-09 RX ORDER — FOLIC ACID 1 MG/1
1 TABLET ORAL DAILY
Qty: 90 TABLET | Refills: 3 | OUTPATIENT
Start: 2020-11-09

## 2020-11-09 RX ORDER — SIMVASTATIN 10 MG
TABLET ORAL
Qty: 90 TABLET | Refills: 3 | OUTPATIENT
Start: 2020-11-09

## 2020-11-09 RX ORDER — LISINOPRIL 40 MG/1
40 TABLET ORAL DAILY
Qty: 90 TABLET | Refills: 3 | OUTPATIENT
Start: 2020-11-09

## 2020-11-19 RX ORDER — FOLIC ACID 1 MG/1
1 TABLET ORAL DAILY
Qty: 90 TABLET | Refills: 3 | OUTPATIENT
Start: 2020-11-19

## 2020-11-30 RX ORDER — SIMVASTATIN 10 MG
TABLET ORAL
Qty: 90 TABLET | Refills: 3 | OUTPATIENT
Start: 2020-11-30

## 2020-12-03 RX ORDER — LISINOPRIL 40 MG/1
40 TABLET ORAL DAILY
Qty: 90 TABLET | Refills: 3 | OUTPATIENT
Start: 2020-12-03

## 2020-12-08 ENCOUNTER — OFFICE VISIT (OUTPATIENT)
Dept: INTERNAL MEDICINE | Facility: CLINIC | Age: 80
End: 2020-12-08

## 2020-12-08 VITALS
WEIGHT: 201 LBS | HEART RATE: 64 BPM | OXYGEN SATURATION: 99 % | DIASTOLIC BLOOD PRESSURE: 67 MMHG | SYSTOLIC BLOOD PRESSURE: 169 MMHG | HEIGHT: 70 IN | BODY MASS INDEX: 28.77 KG/M2 | TEMPERATURE: 98.2 F

## 2020-12-08 DIAGNOSIS — Z11.59 ENCOUNTER FOR HEPATITIS C SCREENING TEST FOR LOW RISK PATIENT: ICD-10-CM

## 2020-12-08 DIAGNOSIS — I10 BENIGN ESSENTIAL HYPERTENSION: ICD-10-CM

## 2020-12-08 DIAGNOSIS — E78.2 MIXED HYPERLIPIDEMIA: Primary | ICD-10-CM

## 2020-12-08 PROCEDURE — 99214 OFFICE O/P EST MOD 30 MIN: CPT | Performed by: NURSE PRACTITIONER

## 2020-12-08 RX ORDER — LISINOPRIL 40 MG/1
40 TABLET ORAL DAILY
Qty: 90 TABLET | Refills: 3 | Status: SHIPPED | OUTPATIENT
Start: 2020-12-08 | End: 2021-09-28

## 2020-12-08 RX ORDER — SIMVASTATIN 10 MG
10 TABLET ORAL NIGHTLY
Qty: 90 TABLET | Refills: 3 | Status: SHIPPED | OUTPATIENT
Start: 2020-12-08 | End: 2021-09-28

## 2020-12-08 NOTE — PROGRESS NOTES
"Date: 2020    Name: Ted Saldivar  : 1940    Chief Complaint:   Chief Complaint   Patient presents with   • Follow-up     needing labs for med refill       HPI:  Ted Saldivar is a 80 y.o. male presents for follow up of hyperlipidemia, hypertension.  Does not eat a particularly heart healthy diet, is sedentary.  Does not monitor blood pressure at home. Denies chest pain, dyspnea, orthopnea, palpitations, lower extremity edema, confusion, headaches, weakness, visual disturbances.    History: The following portions of the patient's history were reviewed and updated as appropriate: allergies, current medications, past medical history, family history, surgical history, social history and problem list.      ROS:  Review of Systems   Constitutional: Negative.    Eyes: Negative.    Respiratory: Negative for wheezing.    Musculoskeletal: Negative for myalgias.   Skin: Negative for pallor and rash.   Neurological: Negative for dizziness.   Hematological: Does not bruise/bleed easily.       VS:  Vitals:    20 0816   BP: 169/67   Pulse: 64   Temp: 98.2 °F (36.8 °C)   TempSrc: Infrared   SpO2: 99%   Weight: 91.2 kg (201 lb)   Height: 177.8 cm (70\")     Body mass index is 28.84 kg/m².  PE:  Physical Exam  Constitutional:       Appearance: He is not ill-appearing.   HENT:      Head: Normocephalic.      Right Ear: External ear normal.      Left Ear: External ear normal.   Eyes:      Conjunctiva/sclera: Conjunctivae normal.      Pupils: Pupils are equal, round, and reactive to light.   Neck:      Musculoskeletal: Normal range of motion and neck supple.   Cardiovascular:      Rate and Rhythm: Normal rate and regular rhythm.      Pulses: Normal pulses.      Heart sounds: Normal heart sounds.   Pulmonary:      Breath sounds: Normal breath sounds.   Skin:     General: Skin is warm.      Capillary Refill: Capillary refill takes less than 2 seconds.   Neurological:      Mental Status: He is alert and oriented to person, " place, and time.      Coordination: Coordination normal.      Gait: Gait normal.   Psychiatric:         Attention and Perception: Attention normal.         Mood and Affect: Mood and affect normal.         Speech: Speech normal.         Behavior: Behavior normal.         Assessment/Plan:  Diagnoses and all orders for this visit:    1. Mixed hyperlipidemia (Primary)  -     Lipid Panel  -     simvastatin (ZOCOR) 10 MG tablet; Take 1 tablet by mouth Every Night.  Dispense: 90 tablet; Refill: 3    2. Benign essential hypertension  -     lisinopril (PRINIVIL,ZESTRIL) 40 MG tablet; Take 1 tablet by mouth Daily.  Dispense: 90 tablet; Refill: 3    3. Encounter for hepatitis C screening test for low risk patient  -     Hepatitis C Antibody        Return for Medicare Wellness.

## 2020-12-17 ENCOUNTER — OFFICE VISIT (OUTPATIENT)
Dept: INTERNAL MEDICINE | Facility: CLINIC | Age: 80
End: 2020-12-17

## 2020-12-17 ENCOUNTER — HOSPITAL ENCOUNTER (OUTPATIENT)
Dept: GENERAL RADIOLOGY | Facility: HOSPITAL | Age: 80
Discharge: HOME | End: 2020-12-17
Admitting: INTERNAL MEDICINE

## 2020-12-17 VITALS
HEART RATE: 61 BPM | HEIGHT: 70 IN | OXYGEN SATURATION: 94 % | DIASTOLIC BLOOD PRESSURE: 94 MMHG | SYSTOLIC BLOOD PRESSURE: 181 MMHG | BODY MASS INDEX: 29.06 KG/M2 | TEMPERATURE: 98.4 F | WEIGHT: 203 LBS | RESPIRATION RATE: 16 BRPM

## 2020-12-17 DIAGNOSIS — E78.2 MIXED HYPERLIPIDEMIA: ICD-10-CM

## 2020-12-17 DIAGNOSIS — I10 BENIGN ESSENTIAL HYPERTENSION: Primary | ICD-10-CM

## 2020-12-17 DIAGNOSIS — M25.562 PAIN IN JOINT OF LEFT KNEE: ICD-10-CM

## 2020-12-17 LAB
ALBUMIN SERPL-MCNC: 4.2 G/DL (ref 3.5–5.2)
ALBUMIN/GLOB SERPL: 1.4 G/DL
ALP SERPL-CCNC: 89 U/L (ref 39–117)
ALT SERPL-CCNC: 18 U/L (ref 1–41)
AST SERPL-CCNC: 20 U/L (ref 1–40)
BASOPHILS # BLD AUTO: 0.08 10*3/MM3 (ref 0–0.2)
BASOPHILS NFR BLD AUTO: 0.9 % (ref 0–1.5)
BILIRUB SERPL-MCNC: 0.4 MG/DL (ref 0–1.2)
BUN SERPL-MCNC: 24 MG/DL (ref 8–23)
BUN/CREAT SERPL: 24 (ref 7–25)
CALCIUM SERPL-MCNC: 9.7 MG/DL (ref 8.6–10.5)
CHLORIDE SERPL-SCNC: 106 MMOL/L (ref 98–107)
CHOLEST SERPL-MCNC: 167 MG/DL (ref 0–200)
CO2 SERPL-SCNC: 27.6 MMOL/L (ref 22–29)
CREAT SERPL-MCNC: 1 MG/DL (ref 0.76–1.27)
EOSINOPHIL # BLD AUTO: 0.16 10*3/MM3 (ref 0–0.4)
EOSINOPHIL NFR BLD AUTO: 1.8 % (ref 0.3–6.2)
ERYTHROCYTE [DISTWIDTH] IN BLOOD BY AUTOMATED COUNT: 13.2 % (ref 12.3–15.4)
GLOBULIN SER CALC-MCNC: 2.9 GM/DL
GLUCOSE SERPL-MCNC: 96 MG/DL (ref 65–99)
HCT VFR BLD AUTO: 40.2 % (ref 37.5–51)
HDLC SERPL-MCNC: 47 MG/DL (ref 40–60)
HGB BLD-MCNC: 13.5 G/DL (ref 13–17.7)
IMM GRANULOCYTES # BLD AUTO: 0.06 10*3/MM3 (ref 0–0.05)
IMM GRANULOCYTES NFR BLD AUTO: 0.7 % (ref 0–0.5)
LDLC SERPL CALC-MCNC: 105 MG/DL (ref 0–100)
LYMPHOCYTES # BLD AUTO: 1.19 10*3/MM3 (ref 0.7–3.1)
LYMPHOCYTES NFR BLD AUTO: 13.4 % (ref 19.6–45.3)
MCH RBC QN AUTO: 32.5 PG (ref 26.6–33)
MCHC RBC AUTO-ENTMCNC: 33.6 G/DL (ref 31.5–35.7)
MCV RBC AUTO: 96.6 FL (ref 79–97)
MONOCYTES # BLD AUTO: 0.8 10*3/MM3 (ref 0.1–0.9)
MONOCYTES NFR BLD AUTO: 9 % (ref 5–12)
NEUTROPHILS # BLD AUTO: 6.6 10*3/MM3 (ref 1.7–7)
NEUTROPHILS NFR BLD AUTO: 74.2 % (ref 42.7–76)
NRBC BLD AUTO-RTO: 0 /100 WBC (ref 0–0.2)
PLATELET # BLD AUTO: 282 10*3/MM3 (ref 140–450)
POTASSIUM SERPL-SCNC: 5.1 MMOL/L (ref 3.5–5.2)
PROT SERPL-MCNC: 7.1 G/DL (ref 6–8.5)
RBC # BLD AUTO: 4.16 10*6/MM3 (ref 4.14–5.8)
SODIUM SERPL-SCNC: 143 MMOL/L (ref 136–145)
TRIGL SERPL-MCNC: 81 MG/DL (ref 0–150)
VLDLC SERPL CALC-MCNC: 15 MG/DL (ref 5–40)
WBC # BLD AUTO: 8.89 10*3/MM3 (ref 3.4–10.8)

## 2020-12-17 PROCEDURE — 73560 X-RAY EXAM OF KNEE 1 OR 2: CPT

## 2020-12-17 PROCEDURE — 96372 THER/PROPH/DIAG INJ SC/IM: CPT | Performed by: INTERNAL MEDICINE

## 2020-12-17 PROCEDURE — 99214 OFFICE O/P EST MOD 30 MIN: CPT | Performed by: INTERNAL MEDICINE

## 2020-12-17 RX ORDER — METHYLPREDNISOLONE SODIUM SUCCINATE 125 MG/2ML
125 INJECTION, POWDER, LYOPHILIZED, FOR SOLUTION INTRAMUSCULAR; INTRAVENOUS ONCE
Status: COMPLETED | OUTPATIENT
Start: 2020-12-17 | End: 2020-12-17

## 2020-12-17 RX ORDER — MELOXICAM 15 MG/1
15 TABLET ORAL DAILY
COMMUNITY
End: 2020-12-17

## 2020-12-17 RX ADMIN — METHYLPREDNISOLONE SODIUM SUCCINATE 125 MG: 125 INJECTION, POWDER, LYOPHILIZED, FOR SOLUTION INTRAMUSCULAR; INTRAVENOUS at 09:05

## 2020-12-17 NOTE — PATIENT INSTRUCTIONS
MyPlate from USDA    MyPlate is an outline of a general healthy diet based on the 2010 Dietary Guidelines for Americans, from the U.[place]. Department of Agriculture (USDA). It sets guidelines for how much food you should eat from each food group based on your age, sex, and level of physical activity.  What are tips for following MyPlate?  To follow MyPlate recommendations:  · Eat a wide variety of fruits and vegetables, grains, and protein foods.  · Serve smaller portions and eat less food throughout the day.  · Limit portion sizes to avoid overeating.  · Enjoy your food.  · Get at least 150 minutes of exercise every week. This is about 30 minutes each day, 5 or more days per week.  It can be difficult to have every meal look like MyPlate. Think about MyPlate as eating guidelines for an entire day, rather than each individual meal.  Fruits and vegetables  · Make half of your plate fruits and vegetables.  · Eat many different colors of fruits and vegetables each day.  · For a 2,000 calorie daily food plan, eat:  ? 2½ cups of vegetables every day.  ? 2 cups of fruit every day.  · 1 cup is equal to:  ? 1 cup raw or cooked vegetables.  ? 1 cup raw fruit.  ? 1 medium-sized orange, apple, or banana.  ? 1 cup 100% fruit or vegetable juice.  ? 2 cups raw leafy greens, such as lettuce, spinach, or kale.  ? ½ cup dried fruit.  Grains  · One fourth of your plate should be grains.  · Make at least half of the grains you eat each day whole grains.  · For a 2,000 calorie daily food plan, eat 6 oz of grains every day.  · 1 oz is equal to:  ? 1 slice bread.  ? 1 cup cereal.  ? ½ cup cooked rice, cereal, or pasta.  Protein  · One fourth of your plate should be protein.  · Eat a wide variety of protein foods, including meat, poultry, fish, eggs, beans, nuts, and tofu.  · For a 2,000 calorie daily food plan, eat 5½ oz of protein every day.  · 1 oz is equal to:  ? 1 oz meat, poultry, or fish.  ? ¼ cup cooked beans.  ? 1 egg.  ? ½ oz nuts  or seeds.  ? 1 Tbsp peanut butter.  Dairy  · Drink fat-free or low-fat (1%) milk.  · Eat or drink dairy as a side to meals.  · For a 2,000 calorie daily food plan, eat or drink 3 cups of dairy every day.  · 1 cup is equal to:  ? 1 cup milk, yogurt, cottage cheese, or soy milk (soy beverage).  ? 2 oz processed cheese.  ? 1½ oz natural cheese.  Fats, oils, salt, and sugars  · Only small amounts of oils are recommended.  · Avoid foods that are high in calories and low in nutritional value (empty calories), like foods high in fat or added sugars.  · Choose foods that are low in salt (sodium). Choose foods that have less than 140 milligrams (mg) of sodium per serving.  · Drink water instead of sugary drinks. Drink enough water each day to keep your urine pale yellow.  Where to find support  · Work with your health care provider or a nutrition specialist (dietitian) to develop a customized eating plan that is right for you.  · Download an brandon (mobile application) to help you track your daily food intake.  Where to find more information  · Go to ChooseMyPlate.gov for more information.  Summary  · MyPlate is a general guideline for healthy eating from the USDA. It is based on the 2010 Dietary Guidelines for Americans.  · In general, fruits and vegetables should take up ½ of your plate, grains should take up ¼ of your plate, and protein should take up ¼ of your plate.  This information is not intended to replace advice given to you by your health care provider. Make sure you discuss any questions you have with your health care provider.  Document Revised: 05/21/2020 Document Reviewed: 03/19/2018  Elsevier Patient Education © 2020 [employer]

## 2020-12-17 NOTE — PROGRESS NOTES
Subjective   Ted Saldivar is a 80 y.o. male.     Chief Complaint   Patient presents with   • Knee Pain     left knee X's 1 week no injury    • Hypertension   • Hyperlipidemia       History of Present Illness   HPI: Patient is here to follow up on the blood pressure  Which is noted to be elevated , The patient is taking the blood pressure medications as prescribed and has had no side effects. The patient is also here to follow up on the cholesterol and is trying to follow a diet. The patient   is  due to get lab work done .  The patient also needs refills on medications .  He also sees rheumatology and is on mobic , he complains of Left knee pain since the past 2 weeks  And it is worsening since the past week, he denies falls or injuries  Hyperlipidemia   Pertinent negatives include no chest pain or shortness of breath.   Hypertension   Pertinent negatives include no chest pain, palpitations or shortness of breath.    The following portions of the patient's history were reviewed and updated as appropriate: allergies, current medications, past family history, past medical history, past social history, past surgical history and problem list.    Review of Systems   Constitutional: Negative for appetite change, fatigue and fever.   HENT: Negative for congestion, ear discharge, ear pain, sinus pressure and sore throat.    Eyes: Negative for pain and discharge.   Respiratory: Negative for cough, shortness of breath and wheezing.    Cardiovascular: Negative for chest pain, palpitations and leg swelling.   Gastrointestinal: Negative for abdominal pain, constipation, diarrhea, nausea and vomiting.   Endocrine: Negative for cold intolerance and heat intolerance.   Genitourinary: Negative for dysuria and flank pain.   Musculoskeletal: Positive for arthralgias. Negative for joint swelling.        Left knee   Skin: Negative for pallor and rash.   Allergic/Immunologic: Negative for environmental allergies and food allergies.  "  Neurological: Negative for dizziness, weakness and numbness.   Hematological: Negative for adenopathy. Does not bruise/bleed easily.   Psychiatric/Behavioral: Negative for behavioral problems and dysphoric mood. The patient is not nervous/anxious.          Current Outpatient Medications:   •  folic acid (FOLVITE) 1 MG tablet, Take 1 tablet by mouth Daily., Disp: 90 tablet, Rfl: 1  •  lisinopril (PRINIVIL,ZESTRIL) 40 MG tablet, Take 1 tablet by mouth Daily., Disp: 90 tablet, Rfl: 3  •  meclizine (ANTIVERT) 25 MG tablet, Take 1 tablet by mouth 3 (Three) Times a Day As Needed for dizziness., Disp: 90 tablet, Rfl: 1  •  methotrexate 2.5 MG tablet, Take 6 tablets by mouth 1 (One) Time. On thursday, Disp: , Rfl:   •  simvastatin (ZOCOR) 10 MG tablet, Take 1 tablet by mouth Every Night., Disp: 90 tablet, Rfl: 3  No current facility-administered medications for this visit.     Objective     Blood pressure (!) 181/94, pulse 61, temperature 98.4 °F (36.9 °C), resp. rate 16, height 177.8 cm (70\"), weight 92.1 kg (203 lb), SpO2 94 %.  Vitals:    12/17/20 0800 12/17/20 0857   BP: 176/85 (!) 181/94   Pulse: 67 61   Resp: 16    Temp: 98.4 °F (36.9 °C)    SpO2: 94%    Weight: 92.1 kg (203 lb)    Height: 177.8 cm (70\")    PainSc:   8        Physical Exam  Vitals signs and nursing note reviewed.   Constitutional:       General: He is not in acute distress.     Appearance: Normal appearance. He is not diaphoretic.   HENT:      Head: Normocephalic and atraumatic.      Right Ear: External ear normal.      Left Ear: External ear normal.      Nose: Nose normal.   Eyes:      Extraocular Movements: Extraocular movements intact.      Conjunctiva/sclera: Conjunctivae normal.   Neck:      Musculoskeletal: Neck supple.      Trachea: Trachea normal.   Cardiovascular:      Rate and Rhythm: Normal rate and regular rhythm.      Heart sounds: Normal heart sounds.   Pulmonary:      Effort: Pulmonary effort is normal. No respiratory distress. "   Abdominal:      General: Abdomen is flat.   Musculoskeletal:      Comments: Moves all limbs   walks with cane   tenderness on medial area of knee   Skin:     General: Skin is warm and dry.      Findings: No erythema.   Neurological:      Mental Status: He is alert and oriented to person, place, and time.      Comments: No gross motor or sensory deficits       Patient's Body mass index is 29.13 kg/m². BMI is within normal parameters. No follow-up required..      Results for orders placed or performed in visit on 10/31/19   Comprehensive Metabolic Panel    Specimen: Blood   Result Value Ref Range    Glucose 93 65 - 99 mg/dL    BUN 28 (H) 8 - 23 mg/dL    Creatinine 1.07 0.76 - 1.27 mg/dL    eGFR Non African Am 67 >60 mL/min/1.73    eGFR African Am 81 >60 mL/min/1.73    BUN/Creatinine Ratio 26.2 (H) 7.0 - 25.0    Sodium 144 136 - 145 mmol/L    Potassium 4.6 3.5 - 5.2 mmol/L    Chloride 106 98 - 107 mmol/L    Total CO2 27.6 22.0 - 29.0 mmol/L    Calcium 9.3 8.6 - 10.5 mg/dL    Total Protein 6.9 6.0 - 8.5 g/dL    Albumin 4.30 3.50 - 5.20 g/dL    Globulin 2.6 gm/dL    A/G Ratio 1.7 g/dL    Total Bilirubin 0.6 0.2 - 1.2 mg/dL    Alkaline Phosphatase 81 39 - 117 U/L    AST (SGOT) 16 1 - 40 U/L    ALT (SGPT) 12 1 - 41 U/L   Lipid Panel    Specimen: Blood   Result Value Ref Range    Total Cholesterol 153 0 - 200 mg/dL    Triglycerides 68 0 - 150 mg/dL    HDL Cholesterol 47 40 - 60 mg/dL    VLDL Cholesterol 13.6 mg/dL    LDL Cholesterol  92 0 - 100 mg/dL   Manual Differential   Result Value Ref Range    Neutrophil Rel % 75.0 42.7 - 76.0 %    Lymphocyte Rel % 18.0 (L) 19.6 - 45.3 %    Monocyte Rel % 2.0 (L) 5.0 - 12.0 %    Eosinophil Rel % 2.0 0.3 - 6.2 %    Basophil Rel % 3.0 (H) 0.0 - 1.5 %    Neutrophils Absolute 5.13 1.70 - 7.00 10*3/mm3    Lymphocytes Absolute 1.23 0.70 - 3.10 10*3/mm3    Monocytes Absolute 0.14 0.10 - 0.90 10*3/mm3    Eosinophil Abs 0.14 0.00 - 0.40 10*3/mm3    Basophils Absolute 0.21 (H) 0.00 - 0.20  10*3/mm3    Differential Comment Comment     Comment Comment     Plt Comment Comment    CBC & Differential    Specimen: Blood   Result Value Ref Range    WBC 6.84 3.40 - 10.80 10*3/mm3    RBC 4.33 4.14 - 5.80 10*6/mm3    Hemoglobin 13.2 13.0 - 17.7 g/dL    Hematocrit 41.5 37.5 - 51.0 %    MCV 95.8 79.0 - 97.0 fL    MCH 30.5 26.6 - 33.0 pg    MCHC 31.8 31.5 - 35.7 g/dL    RDW 14.5 12.3 - 15.4 %    Platelets 291 140 - 450 10*3/mm3    Neutrophil Rel % CANCELED     Lymphocyte Rel % CANCELED     Monocyte Rel % CANCELED     Eosinophil Rel % CANCELED     Lymphocytes Absolute CANCELED     Eosinophils Absolute CANCELED     Basophils Absolute CANCELED          Assessment/Plan   Diagnoses and all orders for this visit:    1. Benign essential hypertension (Primary)    2. Mixed hyperlipidemia  -     CBC & Differential  -     Comprehensive Metabolic Panel  -     Lipid Panel    3. Pain in joint of left knee  -     Cancel: XR Knee 3 View Left  -     Ambulatory Referral to Orthopedic Surgery  -     methylPREDNISolone sodium succinate (SOLU-Medrol) injection 125 mg  -     XR Knee 1 or 2 View Left      Plan:  1.  Benign essential hypertension: Will continue current medication, low-sodium diet advised, Counseled to regularly check BP at home with goal averaging <130/80.   2.mixed hyperlipidemia: will obtain   fasting CMP and lipid panel.  Diet and exercise counseled,  Will continue current medications  3. Left knee pain : im solumedrol given today , refer to orthopedist , xray today       Sheila Natarajan MD

## 2020-12-22 ENCOUNTER — OFFICE VISIT (OUTPATIENT)
Dept: ORTHOPEDIC SURGERY | Facility: CLINIC | Age: 80
End: 2020-12-22

## 2020-12-22 VITALS — HEIGHT: 70 IN | BODY MASS INDEX: 28.77 KG/M2 | WEIGHT: 201 LBS | RESPIRATION RATE: 18 BRPM

## 2020-12-22 DIAGNOSIS — M17.10 ARTHRITIS OF KNEE: ICD-10-CM

## 2020-12-22 DIAGNOSIS — M25.562 ARTHRALGIA OF LEFT KNEE: Primary | ICD-10-CM

## 2020-12-22 PROCEDURE — 20610 DRAIN/INJ JOINT/BURSA W/O US: CPT | Performed by: ORTHOPAEDIC SURGERY

## 2020-12-22 PROCEDURE — 99204 OFFICE O/P NEW MOD 45 MIN: CPT | Performed by: ORTHOPAEDIC SURGERY

## 2020-12-22 RX ORDER — LIDOCAINE HYDROCHLORIDE 10 MG/ML
2 INJECTION, SOLUTION INFILTRATION; PERINEURAL
Status: COMPLETED | OUTPATIENT
Start: 2020-12-22 | End: 2020-12-22

## 2020-12-22 RX ORDER — TRIAMCINOLONE ACETONIDE 40 MG/ML
40 INJECTION, SUSPENSION INTRA-ARTICULAR; INTRAMUSCULAR
Status: COMPLETED | OUTPATIENT
Start: 2020-12-22 | End: 2020-12-22

## 2020-12-22 RX ADMIN — TRIAMCINOLONE ACETONIDE 40 MG: 40 INJECTION, SUSPENSION INTRA-ARTICULAR; INTRAMUSCULAR at 15:14

## 2020-12-22 RX ADMIN — LIDOCAINE HYDROCHLORIDE 2 ML: 10 INJECTION, SOLUTION INFILTRATION; PERINEURAL at 15:14

## 2020-12-22 NOTE — PROGRESS NOTES
Subjective   Patient ID: Ted Saldivar is a 80 y.o. male  Pain of the Left Knee (Patient states his knee became painful a couple of weeks ago with no injury or trauma.)             History of Present Illness  80-year-old complains of 2 weeks of increasing pain in the left knee does not recall specific injury to it saw his PCP had a intramuscular steroid injection which did not help his knee at all, he is walking with a cane for the last week, pain is mechanical in nature burning mostly medial anterior to left knee does not radiate to the hip.  He has had a history of hip replacement the left side and currently complains of pain localized only to the knee area.  Denies calf pain swelling in the ankle, rest pain is mild, pain is worse with activity better with rest and ice--he is followed every 4 months by rheumatologist in [place] and he does receive methotrexate regularly.      Review of Systems   Constitutional: Negative for fever.   HENT: Negative for dental problem and voice change.    Eyes: Negative for visual disturbance.   Respiratory: Negative for shortness of breath.    Cardiovascular: Negative for chest pain.   Gastrointestinal: Negative for abdominal pain.   Genitourinary: Negative for dysuria.   Musculoskeletal: Positive for arthralgias. Negative for gait problem and joint swelling.   Skin: Negative for rash.   Neurological: Negative for speech difficulty.   Hematological: Does not bruise/bleed easily.   Psychiatric/Behavioral: Negative for confusion.       Past Medical History:   Diagnosis Date   • Arthritis    • Hyperlipidemia    • Hypertension         Past Surgical History:   Procedure Laterality Date   • HERNIA REPAIR Bilateral 1983   • ROTATOR CUFF REPAIR Right 2006   • TOTAL HIP ARTHROPLASTY Left 11/2018       Family History   Problem Relation Age of Onset   • Cancer Mother         lung   • Cancer Sister         leukemia       Social History     Socioeconomic History   • Marital status:       "Spouse name: Not on file   • Number of children: Not on file   • Years of education: Not on file   • Highest education level: Not on file   Tobacco Use   • Smoking status: Never Smoker   • Smokeless tobacco: Never Used   Substance and Sexual Activity   • Alcohol use: Yes     Frequency: Monthly or less     Comment: SOCIALLY   • Drug use: No   • Sexual activity: Defer       I have reviewed the medical and surgical history, family history, social history, medications, and/or allergies, and the review of systems of this report.    No Known Allergies      Current Outpatient Medications:   •  folic acid (FOLVITE) 1 MG tablet, Take 1 tablet by mouth Daily., Disp: 90 tablet, Rfl: 1  •  lisinopril (PRINIVIL,ZESTRIL) 40 MG tablet, Take 1 tablet by mouth Daily., Disp: 90 tablet, Rfl: 3  •  meclizine (ANTIVERT) 25 MG tablet, Take 1 tablet by mouth 3 (Three) Times a Day As Needed for dizziness., Disp: 90 tablet, Rfl: 1  •  methotrexate 2.5 MG tablet, Take 6 tablets by mouth 1 (One) Time. On thursday, Disp: , Rfl:   •  simvastatin (ZOCOR) 10 MG tablet, Take 1 tablet by mouth Every Night., Disp: 90 tablet, Rfl: 3    Objective   Resp 18   Ht 177.8 cm (70\")   Wt 91.2 kg (201 lb)   BMI 28.84 kg/m²    Physical Exam  Constitutional: Patient is oriented to person, place, and time. Patient appears well-developed and well-nourished.   HENT:Head: Normocephalic and atraumatic.   Eyes: EOM are normal. Pupils are equal, round, and reactive to light.   Neck: Normal range of motion. Neck supple.   Cardiovascular: Normal rate.    Pulmonary/Chest: Effort normal and breath sounds normal.   Abdominal: Soft.   Neurological: Patient is alert and oriented to person, place, and time.   Skin: Skin is warm and dry.   Psychiatric: Patient has a normal mood and affect.   Nursing note and vitals reviewed.       [unfilled]   Left knee: 2-3+ effusion minimal warmth loss of extension 5 degrees flexion to 120 calf supple stable to ligament stress Katya " sign mildly positive posterior medial joint line pain    Assessment/Plan   Review of Radiographic Studies:    Indication to evaluate joint condition, no comparison views available, shows evident chronic advanced osteoarthritis.      Large Joint Arthrocentesis: L knee  Date/Time: 12/22/2020 3:14 PM  Consent given by: patient  Site marked: site marked  Timeout: Immediately prior to procedure a time out was called to verify the correct patient, procedure, equipment, support staff and site/side marked as required   Supporting Documentation  Indications: pain   Procedure Details  Location: knee - L knee  Preparation: Patient was prepped and draped in the usual sterile fashion  Needle size: 22 G  Approach: anterolateral  Medications administered: 40 mg triamcinolone acetonide 40 MG/ML; 2 mL lidocaine 1 %  Patient tolerance: patient tolerated the procedure well with no immediate complications           Diagnoses and all orders for this visit:    1. Arthralgia of left knee (Primary)  -     XR Knee 1 or 2 View Left    2. Arthritis of knee       Orthopedic activities reviewed and patient expressed appreciation, Risk, benefits, and merits of treatment alternatives reviewed with the patient and questions answered and Using illustrations and models, the nature of the pathology was explained to the patient      Recommendations/Plan:   Work/Activity Status: May perform usual activities as tolerated    Patient agreeable to call or return sooner for any concerns.       I reviewed the patient's radiographs indicating advanced osteoarthritis discussed the natural history treatment options and pros and cons and risks and complications of surgical and nonsurgical care.  I also reviewed advantages and disadvantages risks and complications of steroid injections versus viscus gel injections.  The patient had opportunity to ask questions which were answered.      Impression:  Minimal osteoarthritic change left knee with slight effusion  chronic osteoarthritis  Plan:  Continue to follow with rheumatologist regarding methotrexate medical care for chronic arthritis, if left knee pain continues within 7 to days after today's injection-- they will call and we will order MRI left knee to rule out bone bruise and/or detached meniscal degenerative tear which may be a candidate for arthroscopic treatment

## 2020-12-25 ENCOUNTER — HOSPITAL ENCOUNTER (EMERGENCY)
Facility: HOSPITAL | Age: 80
Discharge: HOME | End: 2020-12-26
Attending: EMERGENCY MEDICINE | Admitting: EMERGENCY MEDICINE

## 2020-12-25 ENCOUNTER — APPOINTMENT (OUTPATIENT)
Dept: CT IMAGING | Facility: HOSPITAL | Age: 80
End: 2020-12-25

## 2020-12-25 DIAGNOSIS — R19.7 DIARRHEA, UNSPECIFIED TYPE: Primary | ICD-10-CM

## 2020-12-25 LAB
ALBUMIN SERPL-MCNC: 4.2 G/DL (ref 3.5–5.2)
ALBUMIN/GLOB SERPL: 1.4 G/DL
ALP SERPL-CCNC: 91 U/L (ref 39–117)
ALT SERPL W P-5'-P-CCNC: 12 U/L (ref 1–41)
ANION GAP SERPL CALCULATED.3IONS-SCNC: 10.9 MMOL/L (ref 5–15)
AST SERPL-CCNC: 15 U/L (ref 1–40)
BASOPHILS # BLD AUTO: 0.06 10*3/MM3 (ref 0–0.2)
BASOPHILS NFR BLD AUTO: 0.4 % (ref 0–1.5)
BILIRUB SERPL-MCNC: 0.7 MG/DL (ref 0–1.2)
BUN SERPL-MCNC: 28 MG/DL (ref 8–23)
BUN/CREAT SERPL: 25 (ref 7–25)
CALCIUM SPEC-SCNC: 9.3 MG/DL (ref 8.6–10.5)
CHLORIDE SERPL-SCNC: 102 MMOL/L (ref 98–107)
CO2 SERPL-SCNC: 25.1 MMOL/L (ref 22–29)
CREAT SERPL-MCNC: 1.12 MG/DL (ref 0.76–1.27)
DEPRECATED RDW RBC AUTO: 51.5 FL (ref 37–54)
EOSINOPHIL # BLD AUTO: 0.27 10*3/MM3 (ref 0–0.4)
EOSINOPHIL NFR BLD AUTO: 1.7 % (ref 0.3–6.2)
ERYTHROCYTE [DISTWIDTH] IN BLOOD BY AUTOMATED COUNT: 14.2 % (ref 12.3–15.4)
GFR SERPL CREATININE-BSD FRML MDRD: 63 ML/MIN/1.73
GLOBULIN UR ELPH-MCNC: 3 GM/DL
GLUCOSE SERPL-MCNC: 102 MG/DL (ref 65–99)
HCT VFR BLD AUTO: 42.5 % (ref 37.5–51)
HGB BLD-MCNC: 14.1 G/DL (ref 13–17.7)
IMM GRANULOCYTES # BLD AUTO: 0.09 10*3/MM3 (ref 0–0.05)
IMM GRANULOCYTES NFR BLD AUTO: 0.6 % (ref 0–0.5)
LIPASE SERPL-CCNC: 49 U/L (ref 13–60)
LYMPHOCYTES # BLD AUTO: 1.64 10*3/MM3 (ref 0.7–3.1)
LYMPHOCYTES NFR BLD AUTO: 10.2 % (ref 19.6–45.3)
MCH RBC QN AUTO: 32.6 PG (ref 26.6–33)
MCHC RBC AUTO-ENTMCNC: 33.2 G/DL (ref 31.5–35.7)
MCV RBC AUTO: 98.4 FL (ref 79–97)
MONOCYTES # BLD AUTO: 1.38 10*3/MM3 (ref 0.1–0.9)
MONOCYTES NFR BLD AUTO: 8.6 % (ref 5–12)
NEUTROPHILS NFR BLD AUTO: 12.67 10*3/MM3 (ref 1.7–7)
NEUTROPHILS NFR BLD AUTO: 78.5 % (ref 42.7–76)
NRBC BLD AUTO-RTO: 0 /100 WBC (ref 0–0.2)
PLATELET # BLD AUTO: 332 10*3/MM3 (ref 140–450)
PMV BLD AUTO: 10.6 FL (ref 6–12)
POTASSIUM SERPL-SCNC: 4.6 MMOL/L (ref 3.5–5.2)
PROT SERPL-MCNC: 7.2 G/DL (ref 6–8.5)
RBC # BLD AUTO: 4.32 10*6/MM3 (ref 4.14–5.8)
SODIUM SERPL-SCNC: 138 MMOL/L (ref 136–145)
WBC # BLD AUTO: 16.11 10*3/MM3 (ref 3.4–10.8)

## 2020-12-25 PROCEDURE — 96361 HYDRATE IV INFUSION ADD-ON: CPT

## 2020-12-25 PROCEDURE — 25010000002 ONDANSETRON PER 1 MG: Performed by: EMERGENCY MEDICINE

## 2020-12-25 PROCEDURE — 96374 THER/PROPH/DIAG INJ IV PUSH: CPT

## 2020-12-25 PROCEDURE — 96375 TX/PRO/DX INJ NEW DRUG ADDON: CPT

## 2020-12-25 PROCEDURE — 74176 CT ABD & PELVIS W/O CONTRAST: CPT

## 2020-12-25 PROCEDURE — 85025 COMPLETE CBC W/AUTO DIFF WBC: CPT | Performed by: EMERGENCY MEDICINE

## 2020-12-25 PROCEDURE — 83690 ASSAY OF LIPASE: CPT | Performed by: EMERGENCY MEDICINE

## 2020-12-25 PROCEDURE — 80053 COMPREHEN METABOLIC PANEL: CPT | Performed by: EMERGENCY MEDICINE

## 2020-12-25 PROCEDURE — 99283 EMERGENCY DEPT VISIT LOW MDM: CPT

## 2020-12-25 PROCEDURE — 25010000002 MORPHINE PER 10 MG: Performed by: EMERGENCY MEDICINE

## 2020-12-25 RX ORDER — MORPHINE SULFATE 4 MG/ML
4 INJECTION, SOLUTION INTRAMUSCULAR; INTRAVENOUS ONCE
Status: COMPLETED | OUTPATIENT
Start: 2020-12-25 | End: 2020-12-25

## 2020-12-25 RX ORDER — ONDANSETRON 2 MG/ML
4 INJECTION INTRAMUSCULAR; INTRAVENOUS ONCE
Status: COMPLETED | OUTPATIENT
Start: 2020-12-25 | End: 2020-12-25

## 2020-12-25 RX ORDER — SODIUM CHLORIDE 0.9 % (FLUSH) 0.9 %
10 SYRINGE (ML) INJECTION AS NEEDED
Status: DISCONTINUED | OUTPATIENT
Start: 2020-12-25 | End: 2020-12-26 | Stop reason: HOSPADM

## 2020-12-25 RX ADMIN — ONDANSETRON 4 MG: 2 INJECTION INTRAMUSCULAR; INTRAVENOUS at 23:58

## 2020-12-25 RX ADMIN — MORPHINE SULFATE 4 MG: 4 INJECTION, SOLUTION INTRAMUSCULAR; INTRAVENOUS at 23:58

## 2020-12-25 RX ADMIN — SODIUM CHLORIDE 500 ML: 9 INJECTION, SOLUTION INTRAVENOUS at 23:00

## 2020-12-26 ENCOUNTER — HOSPITAL ENCOUNTER (OUTPATIENT)
Facility: HOSPITAL | Age: 80
Setting detail: OBSERVATION
Discharge: HOME | End: 2020-12-28
Attending: EMERGENCY MEDICINE | Admitting: INTERNAL MEDICINE

## 2020-12-26 VITALS
BODY MASS INDEX: 28.46 KG/M2 | DIASTOLIC BLOOD PRESSURE: 81 MMHG | HEART RATE: 67 BPM | WEIGHT: 198.8 LBS | TEMPERATURE: 97.9 F | SYSTOLIC BLOOD PRESSURE: 156 MMHG | RESPIRATION RATE: 18 BRPM | OXYGEN SATURATION: 95 % | HEIGHT: 70 IN

## 2020-12-26 DIAGNOSIS — R10.84 GENERALIZED ABDOMINAL PAIN: Primary | ICD-10-CM

## 2020-12-26 DIAGNOSIS — R19.7 DIARRHEA, UNSPECIFIED TYPE: ICD-10-CM

## 2020-12-26 PROBLEM — E78.5 DYSLIPIDEMIA: Status: ACTIVE | Noted: 2020-12-26

## 2020-12-26 PROBLEM — I10 ESSENTIAL HYPERTENSION: Status: ACTIVE | Noted: 2020-12-26

## 2020-12-26 PROBLEM — A09 ACUTE INFECTIVE GASTROENTERITIS: Status: ACTIVE | Noted: 2020-12-26

## 2020-12-26 LAB
ALBUMIN SERPL-MCNC: 3.6 G/DL (ref 3.5–5.2)
ALBUMIN/GLOB SERPL: 1.2 G/DL
ALP SERPL-CCNC: 86 U/L (ref 39–117)
ALT SERPL W P-5'-P-CCNC: 12 U/L (ref 1–41)
ANION GAP SERPL CALCULATED.3IONS-SCNC: 9.6 MMOL/L (ref 5–15)
AST SERPL-CCNC: 15 U/L (ref 1–40)
BASOPHILS # BLD AUTO: 0.07 10*3/MM3 (ref 0–0.2)
BASOPHILS NFR BLD AUTO: 0.4 % (ref 0–1.5)
BILIRUB SERPL-MCNC: 0.8 MG/DL (ref 0–1.2)
BUN SERPL-MCNC: 26 MG/DL (ref 8–23)
BUN/CREAT SERPL: 23.9 (ref 7–25)
CALCIUM SPEC-SCNC: 8.8 MG/DL (ref 8.6–10.5)
CHLORIDE SERPL-SCNC: 105 MMOL/L (ref 98–107)
CO2 SERPL-SCNC: 21.4 MMOL/L (ref 22–29)
CREAT SERPL-MCNC: 1.09 MG/DL (ref 0.76–1.27)
D-LACTATE SERPL-SCNC: 1.6 MMOL/L (ref 0.5–2)
DEPRECATED RDW RBC AUTO: 52 FL (ref 37–54)
EOSINOPHIL # BLD AUTO: 0.16 10*3/MM3 (ref 0–0.4)
EOSINOPHIL NFR BLD AUTO: 0.9 % (ref 0.3–6.2)
ERYTHROCYTE [DISTWIDTH] IN BLOOD BY AUTOMATED COUNT: 14.4 % (ref 12.3–15.4)
GFR SERPL CREATININE-BSD FRML MDRD: 65 ML/MIN/1.73
GLOBULIN UR ELPH-MCNC: 3.1 GM/DL
GLUCOSE SERPL-MCNC: 113 MG/DL (ref 65–99)
HCT VFR BLD AUTO: 42 % (ref 37.5–51)
HGB BLD-MCNC: 13.6 G/DL (ref 13–17.7)
IMM GRANULOCYTES # BLD AUTO: 0.17 10*3/MM3 (ref 0–0.05)
IMM GRANULOCYTES NFR BLD AUTO: 0.9 % (ref 0–0.5)
LARGE PLATELETS: NORMAL
LIPASE SERPL-CCNC: 70 U/L (ref 13–60)
LYMPHOCYTES # BLD AUTO: 1.14 10*3/MM3 (ref 0.7–3.1)
LYMPHOCYTES NFR BLD AUTO: 6.2 % (ref 19.6–45.3)
MCH RBC QN AUTO: 32.4 PG (ref 26.6–33)
MCHC RBC AUTO-ENTMCNC: 32.4 G/DL (ref 31.5–35.7)
MCV RBC AUTO: 100 FL (ref 79–97)
MONOCYTES # BLD AUTO: 1.27 10*3/MM3 (ref 0.1–0.9)
MONOCYTES NFR BLD AUTO: 6.9 % (ref 5–12)
NEUTROPHILS NFR BLD AUTO: 15.64 10*3/MM3 (ref 1.7–7)
NEUTROPHILS NFR BLD AUTO: 84.7 % (ref 42.7–76)
NRBC BLD AUTO-RTO: 0 /100 WBC (ref 0–0.2)
PLATELET # BLD AUTO: 278 10*3/MM3 (ref 140–450)
PMV BLD AUTO: 11.1 FL (ref 6–12)
POTASSIUM SERPL-SCNC: 4.7 MMOL/L (ref 3.5–5.2)
PROT SERPL-MCNC: 6.7 G/DL (ref 6–8.5)
RBC # BLD AUTO: 4.2 10*6/MM3 (ref 4.14–5.8)
RBC MORPH BLD: NORMAL
SARS-COV-2 RNA PNL SPEC NAA+PROBE: NOT DETECTED
SMALL PLATELETS BLD QL SMEAR: ADEQUATE
SODIUM SERPL-SCNC: 136 MMOL/L (ref 136–145)
WBC # BLD AUTO: 18.45 10*3/MM3 (ref 3.4–10.8)
WBC MORPH BLD: NORMAL

## 2020-12-26 PROCEDURE — G0378 HOSPITAL OBSERVATION PER HR: HCPCS

## 2020-12-26 PROCEDURE — 99284 EMERGENCY DEPT VISIT MOD MDM: CPT

## 2020-12-26 PROCEDURE — 25010000002 MORPHINE PER 10 MG: Performed by: EMERGENCY MEDICINE

## 2020-12-26 PROCEDURE — 87635 SARS-COV-2 COVID-19 AMP PRB: CPT | Performed by: INTERNAL MEDICINE

## 2020-12-26 PROCEDURE — 85007 BL SMEAR W/DIFF WBC COUNT: CPT | Performed by: EMERGENCY MEDICINE

## 2020-12-26 PROCEDURE — 83690 ASSAY OF LIPASE: CPT | Performed by: EMERGENCY MEDICINE

## 2020-12-26 PROCEDURE — 83605 ASSAY OF LACTIC ACID: CPT | Performed by: EMERGENCY MEDICINE

## 2020-12-26 PROCEDURE — 99219 PR INITIAL OBSERVATION CARE/DAY 50 MINUTES: CPT | Performed by: INTERNAL MEDICINE

## 2020-12-26 PROCEDURE — C9803 HOPD COVID-19 SPEC COLLECT: HCPCS

## 2020-12-26 PROCEDURE — 96361 HYDRATE IV INFUSION ADD-ON: CPT

## 2020-12-26 PROCEDURE — 96372 THER/PROPH/DIAG INJ SC/IM: CPT

## 2020-12-26 PROCEDURE — 85025 COMPLETE CBC W/AUTO DIFF WBC: CPT | Performed by: EMERGENCY MEDICINE

## 2020-12-26 PROCEDURE — 25010000002 ENOXAPARIN PER 10 MG: Performed by: INTERNAL MEDICINE

## 2020-12-26 PROCEDURE — 80053 COMPREHEN METABOLIC PANEL: CPT | Performed by: EMERGENCY MEDICINE

## 2020-12-26 PROCEDURE — 96375 TX/PRO/DX INJ NEW DRUG ADDON: CPT

## 2020-12-26 PROCEDURE — 36415 COLL VENOUS BLD VENIPUNCTURE: CPT

## 2020-12-26 RX ORDER — ACETAMINOPHEN 325 MG/1
650 TABLET ORAL EVERY 4 HOURS PRN
Status: DISCONTINUED | OUTPATIENT
Start: 2020-12-26 | End: 2020-12-28 | Stop reason: HOSPADM

## 2020-12-26 RX ORDER — ACETAMINOPHEN 160 MG/5ML
650 SOLUTION ORAL EVERY 4 HOURS PRN
Status: DISCONTINUED | OUTPATIENT
Start: 2020-12-26 | End: 2020-12-28 | Stop reason: HOSPADM

## 2020-12-26 RX ORDER — ONDANSETRON 2 MG/ML
4 INJECTION INTRAMUSCULAR; INTRAVENOUS EVERY 6 HOURS PRN
Status: DISCONTINUED | OUTPATIENT
Start: 2020-12-26 | End: 2020-12-28 | Stop reason: HOSPADM

## 2020-12-26 RX ORDER — L.ACID,PARA/B.BIFIDUM/S.THERM 8B CELL
1 CAPSULE ORAL 2 TIMES DAILY
Status: DISCONTINUED | OUTPATIENT
Start: 2020-12-26 | End: 2020-12-28 | Stop reason: HOSPADM

## 2020-12-26 RX ORDER — SODIUM CHLORIDE 0.9 % (FLUSH) 0.9 %
3 SYRINGE (ML) INJECTION EVERY 12 HOURS SCHEDULED
Status: DISCONTINUED | OUTPATIENT
Start: 2020-12-26 | End: 2020-12-28 | Stop reason: HOSPADM

## 2020-12-26 RX ORDER — SODIUM CHLORIDE 0.9 % (FLUSH) 0.9 %
10 SYRINGE (ML) INJECTION AS NEEDED
Status: DISCONTINUED | OUTPATIENT
Start: 2020-12-26 | End: 2020-12-28 | Stop reason: HOSPADM

## 2020-12-26 RX ORDER — SODIUM CHLORIDE 0.9 % (FLUSH) 0.9 %
3-10 SYRINGE (ML) INJECTION AS NEEDED
Status: DISCONTINUED | OUTPATIENT
Start: 2020-12-26 | End: 2020-12-28 | Stop reason: HOSPADM

## 2020-12-26 RX ORDER — MORPHINE SULFATE 2 MG/ML
2 INJECTION, SOLUTION INTRAMUSCULAR; INTRAVENOUS EVERY 4 HOURS PRN
Status: DISCONTINUED | OUTPATIENT
Start: 2020-12-26 | End: 2020-12-28 | Stop reason: HOSPADM

## 2020-12-26 RX ORDER — FOLIC ACID 1 MG/1
1 TABLET ORAL DAILY
Status: DISCONTINUED | OUTPATIENT
Start: 2020-12-26 | End: 2020-12-28 | Stop reason: HOSPADM

## 2020-12-26 RX ORDER — MORPHINE SULFATE 4 MG/ML
4 INJECTION, SOLUTION INTRAMUSCULAR; INTRAVENOUS ONCE
Status: COMPLETED | OUTPATIENT
Start: 2020-12-26 | End: 2020-12-26

## 2020-12-26 RX ORDER — ACETAMINOPHEN 650 MG/1
650 SUPPOSITORY RECTAL EVERY 4 HOURS PRN
Status: DISCONTINUED | OUTPATIENT
Start: 2020-12-26 | End: 2020-12-28 | Stop reason: HOSPADM

## 2020-12-26 RX ORDER — ATORVASTATIN CALCIUM 10 MG/1
10 TABLET, FILM COATED ORAL NIGHTLY
Status: DISCONTINUED | OUTPATIENT
Start: 2020-12-26 | End: 2020-12-28 | Stop reason: HOSPADM

## 2020-12-26 RX ADMIN — SODIUM CHLORIDE 500 ML: 9 INJECTION, SOLUTION INTRAVENOUS at 07:13

## 2020-12-26 RX ADMIN — Medication 1 CAPSULE: at 10:55

## 2020-12-26 RX ADMIN — MORPHINE SULFATE 4 MG: 4 INJECTION, SOLUTION INTRAMUSCULAR; INTRAVENOUS at 07:13

## 2020-12-26 RX ADMIN — SODIUM CHLORIDE, PRESERVATIVE FREE 3 ML: 5 INJECTION INTRAVENOUS at 11:00

## 2020-12-26 RX ADMIN — SODIUM CHLORIDE, PRESERVATIVE FREE 3 ML: 5 INJECTION INTRAVENOUS at 21:00

## 2020-12-26 RX ADMIN — ENOXAPARIN SODIUM 40 MG: 40 INJECTION SUBCUTANEOUS at 10:55

## 2020-12-26 RX ADMIN — ATORVASTATIN CALCIUM 10 MG: 10 TABLET, FILM COATED ORAL at 21:00

## 2020-12-26 RX ADMIN — Medication 1 CAPSULE: at 20:59

## 2020-12-26 RX ADMIN — ENOXAPARIN SODIUM 40 MG: 40 INJECTION SUBCUTANEOUS at 21:00

## 2020-12-26 RX ADMIN — FOLIC ACID 1 MG: 1 TABLET ORAL at 10:55

## 2020-12-26 NOTE — ED PROVIDER NOTES
Subjective   80-year-old male presents to the ED with a chief complaint of diarrhea.  Patient complains of loose watery bowel movements for the past 72 hours.  Describes as profuse.  States that he has had greater than 10/day.  He also states that he has developed some generalized abdominal discomfort.  Achiness throughout his abdomen.  No specific localization or radiation.  No fever chills.  No nausea vomiting or constipation.  No cough shortness of breath or wheeze.  Did take some loperamide earlier which seem to is helped his symptoms.  No other complaints at this time          Review of Systems   Constitutional: Negative for fatigue and fever.   Gastrointestinal: Positive for abdominal pain and vomiting. Negative for diarrhea and nausea.   All other systems reviewed and are negative.      Past Medical History:   Diagnosis Date   • Arthritis    • Hyperlipidemia    • Hypertension        No Known Allergies    Past Surgical History:   Procedure Laterality Date   • HERNIA REPAIR Bilateral 1983   • ROTATOR CUFF REPAIR Right 2006   • TOTAL HIP ARTHROPLASTY Left 11/2018       Family History   Problem Relation Age of Onset   • Cancer Mother         lung   • Cancer Sister         leukemia       Social History     Socioeconomic History   • Marital status:      Spouse name: Not on file   • Number of children: Not on file   • Years of education: Not on file   • Highest education level: Not on file   Tobacco Use   • Smoking status: Never Smoker   • Smokeless tobacco: Never Used   Substance and Sexual Activity   • Alcohol use: Yes     Frequency: Monthly or less     Comment: SOCIALLY   • Drug use: No   • Sexual activity: Defer           Objective   Physical Exam  Vitals signs and nursing note reviewed.   Constitutional:       General: He is not in acute distress.     Appearance: He is well-developed. He is not diaphoretic.   HENT:      Head: Normocephalic and atraumatic.      Nose: Nose normal.   Eyes:       Conjunctiva/sclera: Conjunctivae normal.      Pupils: Pupils are equal, round, and reactive to light.   Cardiovascular:      Rate and Rhythm: Normal rate and regular rhythm.   Pulmonary:      Effort: Pulmonary effort is normal. No respiratory distress.      Breath sounds: Normal breath sounds.   Abdominal:      General: There is no distension.      Palpations: Abdomen is soft.      Tenderness: There is generalized abdominal tenderness. There is no guarding or rebound.      Comments: No rigidity   Musculoskeletal:         General: No deformity.   Neurological:      Mental Status: He is alert and oriented to person, place, and time.      Cranial Nerves: No cranial nerve deficit.      Coordination: Coordination normal.         Procedures           ED Course                                           MDM  80-year-old male presented to the ED with diarrhea.  Laboratories also reassuring with a slight elevation in his white blood cell count..  Treated with IV fluid rehydration.  Had taken loperamide previously.  He had no further episodes of diarrhea or loose bowel movements while here in the ED.  CT abdomen pelvis was negative for acute process.  He is feeling well and is appropriate for discharge at this time.  Suspect viral gastroenteritis versus other illness.  Follow-up as needed.        Final diagnoses:   Diarrhea, unspecified type            Nathaniel Oneal, DO  12/26/20 0027

## 2020-12-27 LAB
ADV 40+41 DNA STL QL NAA+NON-PROBE: NOT DETECTED
ALBUMIN SERPL-MCNC: 3.5 G/DL (ref 3.5–5.2)
ALBUMIN/GLOB SERPL: 1.3 G/DL
ALP SERPL-CCNC: 82 U/L (ref 39–117)
ALT SERPL W P-5'-P-CCNC: 10 U/L (ref 1–41)
ANION GAP SERPL CALCULATED.3IONS-SCNC: 10.7 MMOL/L (ref 5–15)
AST SERPL-CCNC: 13 U/L (ref 1–40)
ASTRO TYP 1-8 RNA STL QL NAA+NON-PROBE: NOT DETECTED
BASOPHILS # BLD AUTO: 0.06 10*3/MM3 (ref 0–0.2)
BASOPHILS NFR BLD AUTO: 0.5 % (ref 0–1.5)
BILIRUB SERPL-MCNC: 0.8 MG/DL (ref 0–1.2)
BUN SERPL-MCNC: 24 MG/DL (ref 8–23)
BUN/CREAT SERPL: 25.3 (ref 7–25)
C CAYETANENSIS DNA STL QL NAA+NON-PROBE: NOT DETECTED
CALCIUM SPEC-SCNC: 8.6 MG/DL (ref 8.6–10.5)
CAMPY SP DNA.DIARRHEA STL QL NAA+PROBE: NOT DETECTED
CHLORIDE SERPL-SCNC: 105 MMOL/L (ref 98–107)
CO2 SERPL-SCNC: 21.3 MMOL/L (ref 22–29)
CREAT SERPL-MCNC: 0.95 MG/DL (ref 0.76–1.27)
CRYPTOSP STL CULT: NOT DETECTED
DEPRECATED RDW RBC AUTO: 51.4 FL (ref 37–54)
E HISTOLYT AG STL-ACNC: NOT DETECTED
EAEC PAA PLAS AGGR+AATA ST NAA+NON-PRB: NOT DETECTED
EC STX1 + STX2 GENES STL NAA+PROBE: NOT DETECTED
EOSINOPHIL # BLD AUTO: 0.28 10*3/MM3 (ref 0–0.4)
EOSINOPHIL NFR BLD AUTO: 2.5 % (ref 0.3–6.2)
EPEC EAE GENE STL QL NAA+NON-PROBE: NOT DETECTED
ERYTHROCYTE [DISTWIDTH] IN BLOOD BY AUTOMATED COUNT: 14.4 % (ref 12.3–15.4)
ETEC LTA+ST1A+ST1B TOX ST NAA+NON-PROBE: NOT DETECTED
G LAMBLIA DNA SPEC QL NAA+PROBE: NOT DETECTED
GFR SERPL CREATININE-BSD FRML MDRD: 76 ML/MIN/1.73
GLOBULIN UR ELPH-MCNC: 2.8 GM/DL
GLUCOSE SERPL-MCNC: 87 MG/DL (ref 65–99)
HCT VFR BLD AUTO: 38.9 % (ref 37.5–51)
HGB BLD-MCNC: 12.8 G/DL (ref 13–17.7)
IMM GRANULOCYTES # BLD AUTO: 0.06 10*3/MM3 (ref 0–0.05)
IMM GRANULOCYTES NFR BLD AUTO: 0.5 % (ref 0–0.5)
LIPASE SERPL-CCNC: 19 U/L (ref 13–60)
LYMPHOCYTES # BLD AUTO: 1.45 10*3/MM3 (ref 0.7–3.1)
LYMPHOCYTES NFR BLD AUTO: 13.1 % (ref 19.6–45.3)
MCH RBC QN AUTO: 32.2 PG (ref 26.6–33)
MCHC RBC AUTO-ENTMCNC: 32.9 G/DL (ref 31.5–35.7)
MCV RBC AUTO: 97.7 FL (ref 79–97)
MONOCYTES # BLD AUTO: 0.76 10*3/MM3 (ref 0.1–0.9)
MONOCYTES NFR BLD AUTO: 6.9 % (ref 5–12)
NEUTROPHILS NFR BLD AUTO: 76.5 % (ref 42.7–76)
NEUTROPHILS NFR BLD AUTO: 8.42 10*3/MM3 (ref 1.7–7)
NOROVIRUS GI+II RNA STL QL NAA+NON-PROBE: NOT DETECTED
NRBC BLD AUTO-RTO: 0 /100 WBC (ref 0–0.2)
P SHIGELLOIDES DNA STL QL NAA+PROBE: NOT DETECTED
PLATELET # BLD AUTO: 296 10*3/MM3 (ref 140–450)
PMV BLD AUTO: 10.8 FL (ref 6–12)
POTASSIUM SERPL-SCNC: 4.2 MMOL/L (ref 3.5–5.2)
PROT SERPL-MCNC: 6.3 G/DL (ref 6–8.5)
RBC # BLD AUTO: 3.98 10*6/MM3 (ref 4.14–5.8)
RV RNA STL NAA+PROBE: NOT DETECTED
SALMONELLA DNA SPEC QL NAA+PROBE: NOT DETECTED
SAPO I+II+IV+V RNA STL QL NAA+NON-PROBE: NOT DETECTED
SHIGELLA SP+EIEC IPAH STL QL NAA+PROBE: NOT DETECTED
SODIUM SERPL-SCNC: 137 MMOL/L (ref 136–145)
TROPONIN T SERPL-MCNC: <0.01 NG/ML (ref 0–0.03)
V CHOLERAE DNA SPEC QL NAA+PROBE: NOT DETECTED
VIBRIO DNA SPEC NAA+PROBE: NOT DETECTED
WBC # BLD AUTO: 11.03 10*3/MM3 (ref 3.4–10.8)
YERSINIA STL CULT: NOT DETECTED

## 2020-12-27 PROCEDURE — 0097U HC BIOFIRE FILMARRAY GI PANEL: CPT | Performed by: INTERNAL MEDICINE

## 2020-12-27 PROCEDURE — G0378 HOSPITAL OBSERVATION PER HR: HCPCS

## 2020-12-27 PROCEDURE — 96372 THER/PROPH/DIAG INJ SC/IM: CPT

## 2020-12-27 PROCEDURE — 25010000002 ENOXAPARIN PER 10 MG: Performed by: INTERNAL MEDICINE

## 2020-12-27 PROCEDURE — 25010000002 MORPHINE SULFATE (PF) 2 MG/ML SOLUTION: Performed by: INTERNAL MEDICINE

## 2020-12-27 PROCEDURE — 99225 PR SBSQ OBSERVATION CARE/DAY 25 MINUTES: CPT | Performed by: INTERNAL MEDICINE

## 2020-12-27 PROCEDURE — 80053 COMPREHEN METABOLIC PANEL: CPT | Performed by: INTERNAL MEDICINE

## 2020-12-27 PROCEDURE — 84484 ASSAY OF TROPONIN QUANT: CPT | Performed by: INTERNAL MEDICINE

## 2020-12-27 PROCEDURE — 96361 HYDRATE IV INFUSION ADD-ON: CPT

## 2020-12-27 PROCEDURE — 96367 TX/PROPH/DG ADDL SEQ IV INF: CPT

## 2020-12-27 PROCEDURE — 96365 THER/PROPH/DIAG IV INF INIT: CPT

## 2020-12-27 PROCEDURE — 25010000002 LEVOFLOXACIN PER 250 MG: Performed by: INTERNAL MEDICINE

## 2020-12-27 PROCEDURE — 85025 COMPLETE CBC W/AUTO DIFF WBC: CPT | Performed by: INTERNAL MEDICINE

## 2020-12-27 PROCEDURE — 96366 THER/PROPH/DIAG IV INF ADDON: CPT

## 2020-12-27 PROCEDURE — 96376 TX/PRO/DX INJ SAME DRUG ADON: CPT

## 2020-12-27 PROCEDURE — 83690 ASSAY OF LIPASE: CPT | Performed by: INTERNAL MEDICINE

## 2020-12-27 RX ORDER — HYDROCODONE BITARTRATE AND ACETAMINOPHEN 5; 325 MG/1; MG/1
1 TABLET ORAL EVERY 6 HOURS PRN
Status: DISCONTINUED | OUTPATIENT
Start: 2020-12-27 | End: 2020-12-28 | Stop reason: HOSPADM

## 2020-12-27 RX ORDER — DEXTROSE AND SODIUM CHLORIDE 5; .9 G/100ML; G/100ML
100 INJECTION, SOLUTION INTRAVENOUS CONTINUOUS
Status: ACTIVE | OUTPATIENT
Start: 2020-12-27 | End: 2020-12-27

## 2020-12-27 RX ORDER — LEVOFLOXACIN 5 MG/ML
500 INJECTION, SOLUTION INTRAVENOUS EVERY 24 HOURS
Status: DISCONTINUED | OUTPATIENT
Start: 2020-12-27 | End: 2020-12-28 | Stop reason: HOSPADM

## 2020-12-27 RX ADMIN — SODIUM CHLORIDE, PRESERVATIVE FREE 10 ML: 5 INJECTION INTRAVENOUS at 10:00

## 2020-12-27 RX ADMIN — ATORVASTATIN CALCIUM 10 MG: 10 TABLET, FILM COATED ORAL at 20:18

## 2020-12-27 RX ADMIN — DEXTROSE AND SODIUM CHLORIDE 100 ML/HR: 5; 900 INJECTION, SOLUTION INTRAVENOUS at 11:45

## 2020-12-27 RX ADMIN — METRONIDAZOLE 500 MG: 500 INJECTION, SOLUTION INTRAVENOUS at 20:18

## 2020-12-27 RX ADMIN — ENOXAPARIN SODIUM 40 MG: 40 INJECTION SUBCUTANEOUS at 10:00

## 2020-12-27 RX ADMIN — Medication 1 CAPSULE: at 20:18

## 2020-12-27 RX ADMIN — METRONIDAZOLE 500 MG: 500 INJECTION, SOLUTION INTRAVENOUS at 13:30

## 2020-12-27 RX ADMIN — MORPHINE SULFATE 2 MG: 2 INJECTION, SOLUTION INTRAMUSCULAR; INTRAVENOUS at 10:00

## 2020-12-27 RX ADMIN — SODIUM CHLORIDE, PRESERVATIVE FREE 3 ML: 5 INJECTION INTRAVENOUS at 08:30

## 2020-12-27 RX ADMIN — Medication 1 CAPSULE: at 08:30

## 2020-12-27 RX ADMIN — FOLIC ACID 1 MG: 1 TABLET ORAL at 08:30

## 2020-12-27 RX ADMIN — LEVOFLOXACIN 500 MG: 5 INJECTION, SOLUTION INTRAVENOUS at 11:45

## 2020-12-28 ENCOUNTER — READMISSION MANAGEMENT (OUTPATIENT)
Dept: CALL CENTER | Facility: HOSPITAL | Age: 80
End: 2020-12-28

## 2020-12-28 VITALS
OXYGEN SATURATION: 96 % | RESPIRATION RATE: 18 BRPM | HEART RATE: 67 BPM | SYSTOLIC BLOOD PRESSURE: 144 MMHG | BODY MASS INDEX: 28.28 KG/M2 | HEIGHT: 70 IN | TEMPERATURE: 98.1 F | DIASTOLIC BLOOD PRESSURE: 69 MMHG | WEIGHT: 197.53 LBS

## 2020-12-28 LAB
ALBUMIN SERPL-MCNC: 3.3 G/DL (ref 3.5–5.2)
ALBUMIN/GLOB SERPL: 1.1 G/DL
ALP SERPL-CCNC: 82 U/L (ref 39–117)
ALT SERPL W P-5'-P-CCNC: 9 U/L (ref 1–41)
ANION GAP SERPL CALCULATED.3IONS-SCNC: 11 MMOL/L (ref 5–15)
AST SERPL-CCNC: 12 U/L (ref 1–40)
BILIRUB SERPL-MCNC: 0.6 MG/DL (ref 0–1.2)
BUN SERPL-MCNC: 19 MG/DL (ref 8–23)
BUN/CREAT SERPL: 19 (ref 7–25)
CALCIUM SPEC-SCNC: 8.4 MG/DL (ref 8.6–10.5)
CHLORIDE SERPL-SCNC: 106 MMOL/L (ref 98–107)
CO2 SERPL-SCNC: 20 MMOL/L (ref 22–29)
CREAT SERPL-MCNC: 1 MG/DL (ref 0.76–1.27)
D-LACTATE SERPL-SCNC: 0.8 MMOL/L (ref 0.5–2)
DEPRECATED RDW RBC AUTO: 48.6 FL (ref 37–54)
ERYTHROCYTE [DISTWIDTH] IN BLOOD BY AUTOMATED COUNT: 14 % (ref 12.3–15.4)
GFR SERPL CREATININE-BSD FRML MDRD: 72 ML/MIN/1.73
GLOBULIN UR ELPH-MCNC: 3 GM/DL
GLUCOSE SERPL-MCNC: 94 MG/DL (ref 65–99)
HCT VFR BLD AUTO: 38.1 % (ref 37.5–51)
HGB BLD-MCNC: 12.8 G/DL (ref 13–17.7)
LIPASE SERPL-CCNC: 17 U/L (ref 13–60)
MCH RBC QN AUTO: 32.2 PG (ref 26.6–33)
MCHC RBC AUTO-ENTMCNC: 33.6 G/DL (ref 31.5–35.7)
MCV RBC AUTO: 96 FL (ref 79–97)
PLATELET # BLD AUTO: 282 10*3/MM3 (ref 140–450)
PMV BLD AUTO: 10.8 FL (ref 6–12)
POTASSIUM SERPL-SCNC: 4.2 MMOL/L (ref 3.5–5.2)
PROT SERPL-MCNC: 6.3 G/DL (ref 6–8.5)
RBC # BLD AUTO: 3.97 10*6/MM3 (ref 4.14–5.8)
SODIUM SERPL-SCNC: 137 MMOL/L (ref 136–145)
TROPONIN T SERPL-MCNC: <0.01 NG/ML (ref 0–0.03)
WBC # BLD AUTO: 11.9 10*3/MM3 (ref 3.4–10.8)

## 2020-12-28 PROCEDURE — 96372 THER/PROPH/DIAG INJ SC/IM: CPT

## 2020-12-28 PROCEDURE — 99217 PR OBSERVATION CARE DISCHARGE MANAGEMENT: CPT | Performed by: INTERNAL MEDICINE

## 2020-12-28 PROCEDURE — 84484 ASSAY OF TROPONIN QUANT: CPT | Performed by: INTERNAL MEDICINE

## 2020-12-28 PROCEDURE — G0378 HOSPITAL OBSERVATION PER HR: HCPCS

## 2020-12-28 PROCEDURE — 85027 COMPLETE CBC AUTOMATED: CPT | Performed by: INTERNAL MEDICINE

## 2020-12-28 PROCEDURE — 83605 ASSAY OF LACTIC ACID: CPT | Performed by: INTERNAL MEDICINE

## 2020-12-28 PROCEDURE — 80053 COMPREHEN METABOLIC PANEL: CPT | Performed by: INTERNAL MEDICINE

## 2020-12-28 PROCEDURE — 83690 ASSAY OF LIPASE: CPT | Performed by: INTERNAL MEDICINE

## 2020-12-28 PROCEDURE — 25010000002 ENOXAPARIN PER 10 MG: Performed by: INTERNAL MEDICINE

## 2020-12-28 RX ORDER — LEVOFLOXACIN 500 MG/1
500 TABLET, FILM COATED ORAL DAILY
Qty: 3 TABLET | Refills: 0 | Status: SHIPPED | OUTPATIENT
Start: 2020-12-28 | End: 2020-12-31

## 2020-12-28 RX ORDER — METRONIDAZOLE 500 MG/1
500 TABLET ORAL 3 TIMES DAILY
Qty: 9 TABLET | Refills: 0 | Status: SHIPPED | OUTPATIENT
Start: 2020-12-28 | End: 2021-01-07

## 2020-12-28 RX ADMIN — METRONIDAZOLE 500 MG: 500 INJECTION, SOLUTION INTRAVENOUS at 05:58

## 2020-12-28 RX ADMIN — Medication 1 CAPSULE: at 09:30

## 2020-12-28 RX ADMIN — ENOXAPARIN SODIUM 40 MG: 40 INJECTION SUBCUTANEOUS at 09:29

## 2020-12-28 RX ADMIN — FOLIC ACID 1 MG: 1 TABLET ORAL at 09:30

## 2020-12-28 RX ADMIN — SODIUM CHLORIDE, PRESERVATIVE FREE 3 ML: 5 INJECTION INTRAVENOUS at 09:33

## 2020-12-28 NOTE — OUTREACH NOTE
Prep Survey      Responses   [facility] facility patient discharged from?  [facility]   Is LACE score < 7 ?  Yes   Emergency Room discharge w/ pulse ox?  No   Eligibility  TCM   [facility]   Date of Admission  12/26/20   Date of Discharge  12/28/20   Discharge Disposition  Home or Self Care   Discharge diagnosis  Acute infective gastroenteritis, RA on methotrexate   Does the patient have one of the following disease processes/diagnoses(primary or secondary)?  Other   Does the patient have Home health ordered?  No   Is there a DME ordered?  No   Prep survey completed?  Yes          Lilian Shaw RN

## 2020-12-29 ENCOUNTER — TRANSITIONAL CARE MANAGEMENT TELEPHONE ENCOUNTER (OUTPATIENT)
Dept: CALL CENTER | Facility: HOSPITAL | Age: 80
End: 2020-12-29

## 2020-12-29 NOTE — OUTREACH NOTE
Call Center TCM Note      Responses   [facility] patient discharged from?  [facility]   Does the patient have one of the following disease processes/diagnoses(primary or secondary)?  Other   TCM attempt successful?  Yes   Call start time  1501   Call end time  1504   Discharge diagnosis  Acute infective gastroenteritis, RA on methotrexate   Is patient permission given to speak with other caregiver?  Yes   List who call center can speak with  Akiko prieto   Person spoke with today (if not patient) and relationship  wifeAkiko   Meds reviewed with patient/caregiver?  Yes   Is the patient having any side effects they believe may be caused by any medication additions or changes?  No   Does the patient have all medications ordered at discharge?  Yes   Is the patient taking all medications as directed (includes completed medication regime)?  Yes   Does the patient have a primary care provider?   Yes   Does the patient have an appointment with their PCP within 7 days of discharge?  Greater than 7 days   Comments regarding PCP  PCP Dr Sheila Natarajan. Hospital follow up scheduled for 1/7/21  1030am   Nursing Interventions  Verified appointment date/time/provider   Has the patient kept scheduled appointments due by today?  N/A   Has home health visited the patient within 72 hours of discharge?  N/A   Psychosocial issues?  No   Did the patient receive a copy of their discharge instructions?  Yes   Nursing interventions  Reviewed instructions with patient   What is the patient's perception of their health status since discharge?  Improving   Is the patient/caregiver able to teach back signs and symptoms related to disease process for when to call PCP?  Yes   Is the patient/caregiver able to teach back signs and symptoms related to disease process for when to call 911?  Yes   Is the patient/caregiver able to teach back the hierarchy of who to call/visit for symptoms/problems? PCP, Specialist, Home health nurse,  "Urgent Care, ED, 911  Yes   If the patient is a current smoker, are they able to teach back resources for cessation?  Not a smoker   TCM call completed?  Yes   Wrap up additional comments  Wife reports patient tolerating a \"light\" diet and no diarrhea today.           Akiko sAhton RN    12/29/2020, 15:04 EST      "

## 2021-01-07 ENCOUNTER — OFFICE VISIT (OUTPATIENT)
Dept: INTERNAL MEDICINE | Facility: CLINIC | Age: 81
End: 2021-01-07

## 2021-01-07 VITALS
RESPIRATION RATE: 16 BRPM | HEIGHT: 70 IN | WEIGHT: 194 LBS | OXYGEN SATURATION: 96 % | TEMPERATURE: 97.8 F | DIASTOLIC BLOOD PRESSURE: 91 MMHG | BODY MASS INDEX: 27.77 KG/M2 | SYSTOLIC BLOOD PRESSURE: 157 MMHG | HEART RATE: 64 BPM

## 2021-01-07 DIAGNOSIS — I10 BENIGN ESSENTIAL HYPERTENSION: Primary | ICD-10-CM

## 2021-01-07 DIAGNOSIS — R10.84 GENERALIZED ABDOMINAL PAIN: ICD-10-CM

## 2021-01-07 PROCEDURE — 1111F DSCHRG MED/CURRENT MED MERGE: CPT | Performed by: INTERNAL MEDICINE

## 2021-01-07 PROCEDURE — 99495 TRANSJ CARE MGMT MOD F2F 14D: CPT | Performed by: INTERNAL MEDICINE

## 2021-01-07 RX ORDER — MELOXICAM 15 MG/1
1 TABLET ORAL DAILY
COMMUNITY
Start: 2020-12-07 | End: 2021-02-24

## 2021-01-07 NOTE — PROGRESS NOTES
"Chief Complaint  Follow-up (admit [facility] 12/26/2020 discharge 12/28/2020 )    Subjective     {CC  Problem List  Visit Diagnosis   Encounters  Notes  Medications  Labs  Result Review Imaging  Media :23}     Ted Saldivar presents to [facility] for   History of Present Illness    Objective   Vital Signs:   /91   Pulse 64   Temp 97.8 °F (36.6 °C)   Resp 16   Ht 177.8 cm (70\")   Wt 88 kg (194 lb)   SpO2 96%   BMI 27.84 kg/m²     Physical Exam  Vitals signs and nursing note reviewed.   Constitutional:       General: He is not in acute distress.     Appearance: Normal appearance. He is not diaphoretic.   HENT:      Head: Normocephalic and atraumatic.      Right Ear: External ear normal.      Left Ear: External ear normal.      Nose: Nose normal.   Eyes:      Extraocular Movements: Extraocular movements intact.      Conjunctiva/sclera: Conjunctivae normal.   Neck:      Musculoskeletal: Neck supple.      Trachea: Trachea normal.   Cardiovascular:      Rate and Rhythm: Normal rate and regular rhythm.      Heart sounds: Normal heart sounds.   Pulmonary:      Effort: Pulmonary effort is normal. No respiratory distress.   Abdominal:      General: Abdomen is flat.   Musculoskeletal:      Comments: Moves all limbs   Skin:     General: Skin is warm and dry.      Findings: No erythema.   Neurological:      Mental Status: He is alert and oriented to person, place, and time.      Comments: No gross motor or sensory deficits        Result Review :{ Labs  Result Review  Imaging  Med Tab  Media :23}   {The following data was reviewed by (Optional):[id number]}  Common labs    Common Labsle 12/26/20 12/26/20 12/27/20 12/27/20 12/28/20 12/28/20    0715 0715 0540 0540 0640 0640   BUN  26 (A)  24 (A)  19   Creatinine  1.09  0.95  1.00   eGFR Non  Am  65  76  72   Sodium  136  137  137   Potassium  4.7  4.2  4.2   Chloride  105  105  106   Calcium  8.8  8.6  8.4 (A)   Albumin  3.60  3.50  " 3.30 (A)   Total Bilirubin  0.8  0.8  0.6   Alkaline Phosphatase  86  82  82   AST (SGOT)  15  13  12   ALT (SGPT)  12  10  9   WBC 18.45 (A)  11.03 (A)  11.90 (A)    Hemoglobin 13.6  12.8 (A)  12.8 (A)    Hematocrit 42.0  38.9  38.1    Platelets 278  296  282    (A) Abnormal value       Comments are available for some flowsheets but are not being displayed.           Data reviewed: GI studies hospital   ED to Hosp-Admission (Discharged) with Fidencio Gonzalez MD; Sabino Palmer MD (12/26/2020)  ED with Nathaniel Oneal DO (12/25/2020)  CT Abdomen Pelvis Without Contrast (12/25/2020 23:24)             Assessment and Plan {CC Problem List  Visit Diagnosis  ROS  Review (Popup)  Health Maintenance  Quality  BestPractice  Medications  SmartSets  SnapShot Encounters  Media :23}   Problem List Items Addressed This Visit        Gastrointestinal Abdominal     Generalized abdominal pain    Relevant Orders    CBC & Differential    Comprehensive Metabolic Panel      Other Visit Diagnoses     Benign essential hypertension    -  Primary        I spent  30  minutes caring for Ted on this date of service. This time includes time spent by me in the following activities:preparing for the visit, reviewing tests, performing a medically appropriate examination and/or evaluation , counseling and educating the patient/family/caregiver, ordering medications, tests, or procedures and documenting information in the medical record  Follow Up {Instructions Charge Capture  Follow-up Communications :23}  No follow-ups on file.  Patient was given instructions and counseling regarding his condition or for health maintenance advice. Please see specific information pulled into the AVS if appropriate.

## 2021-01-07 NOTE — PROGRESS NOTES
Transitional Care Follow Up Visit  Subjective     Ted Saldivar is a 80 y.o. male who presents for a transitional care management visit.    Within 48 business hours after discharge our office contacted him via telephone to coordinate his care and needs.      I reviewed and discussed the details of that call along with the discharge summary, hospital problems, inpatient lab results, inpatient diagnostic studies, and consultation reports with Ted.     Current outpatient and discharge medications have been reconciled for the patient.  Reviewed by: Sheila Natarajan MD  Chief Complaint   Patient presents with   • Follow-up     admit R 12/26/2020 discharge 12/28/2020    • Hypertension         Date of TCM Phone Call 12/28/2020   [facility]   Date of Admission 12/26/2020   Date of Discharge 12/28/2020   Discharge Disposition Home or Self Care     Risk for Readmission (LACE) Score: 3 (12/28/2020  6:00 AM)      History of Present Illness   Course During Hospital Stay: The patient is also here to follow up on hospitalization at [facility], admitted on December 26, 2020 and discharged on December 28, 2020 for acute abdominal pain, secondary to acute infectious diarrhea, he was put on oral antibiotics which she is completed and his symptoms have resolved, Hospital and er records ,  Lab reports  and Radiology reports have been reviewed  Today and medications reconciled and updated .  Patient declines referral to GI for colonoscopy which she has had several years ago, patient is also to follow-up on his blood pressure which is noted to be elevated but he states he just took his medications     The following portions of the patient's history were reviewed and updated as appropriate: allergies, current medications, past family history, past medical history, past social history, past surgical history and problem list.    Review of Systems   Constitutional: Negative for appetite change, fatigue and fever.   HENT:  Negative for congestion, ear discharge, ear pain, sinus pressure and sore throat.    Eyes: Negative for pain and discharge.   Respiratory: Negative for cough, shortness of breath and wheezing.    Cardiovascular: Negative for chest pain, palpitations and leg swelling.   Gastrointestinal: Negative for abdominal pain, constipation, diarrhea, nausea and vomiting.   Endocrine: Negative for cold intolerance and heat intolerance.   Genitourinary: Negative for dysuria and flank pain.   Musculoskeletal: Negative for arthralgias and joint swelling.   Skin: Negative for pallor and rash.   Allergic/Immunologic: Negative for environmental allergies and food allergies.   Neurological: Negative for dizziness, weakness and numbness.   Hematological: Negative for adenopathy. Does not bruise/bleed easily.   Psychiatric/Behavioral: Negative for behavioral problems and dysphoric mood. The patient is not nervous/anxious.        Objective   Physical Exam  Vitals signs and nursing note reviewed.   Constitutional:       General: He is not in acute distress.     Appearance: Normal appearance. He is not diaphoretic.   HENT:      Head: Normocephalic and atraumatic.      Right Ear: External ear normal.      Left Ear: External ear normal.      Nose: Nose normal.   Eyes:      Extraocular Movements: Extraocular movements intact.      Conjunctiva/sclera: Conjunctivae normal.   Neck:      Musculoskeletal: Neck supple.      Trachea: Trachea normal.   Cardiovascular:      Rate and Rhythm: Normal rate and regular rhythm.      Heart sounds: Normal heart sounds.   Pulmonary:      Effort: Pulmonary effort is normal. No respiratory distress.   Abdominal:      General: Abdomen is flat.   Musculoskeletal:      Comments: Moves all limbs   Skin:     General: Skin is warm and dry.      Findings: No erythema.   Neurological:      Mental Status: He is alert and oriented to person, place, and time.      Comments: No gross motor or sensory deficits          Assessment/Plan   Diagnoses and all orders for this visit:    1. Benign essential hypertension (Primary)    2. Generalized abdominal pain  -     CBC & Differential  -     Comprehensive Metabolic Panel      Plan:  1.  Benign essential hypertension: Will continue current medication, low-sodium diet advised, Counseled to regularly check BP at home with goal averaging <130/80.   2.  Generalized abdominal pain: Currently resolved, most secondary to infectious gastroenteritis and patient has completed antibiotics, will obtain labs, he is declined referral to GI for colonoscopy at this time

## 2021-02-20 ENCOUNTER — HOSPITAL ENCOUNTER (EMERGENCY)
Facility: HOSPITAL | Age: 81
Discharge: HOME | End: 2021-02-20
Attending: EMERGENCY MEDICINE | Admitting: EMERGENCY MEDICINE

## 2021-02-20 ENCOUNTER — APPOINTMENT (OUTPATIENT)
Dept: CT IMAGING | Facility: HOSPITAL | Age: 81
End: 2021-02-20

## 2021-02-20 VITALS
DIASTOLIC BLOOD PRESSURE: 63 MMHG | BODY MASS INDEX: 26.48 KG/M2 | OXYGEN SATURATION: 98 % | RESPIRATION RATE: 18 BRPM | WEIGHT: 185 LBS | HEIGHT: 70 IN | TEMPERATURE: 98.9 F | SYSTOLIC BLOOD PRESSURE: 122 MMHG | HEART RATE: 71 BPM

## 2021-02-20 DIAGNOSIS — K52.9 ENTERITIS: Primary | ICD-10-CM

## 2021-02-20 DIAGNOSIS — R10.9 ABDOMINAL PAIN, UNSPECIFIED ABDOMINAL LOCATION: ICD-10-CM

## 2021-02-20 LAB
ALBUMIN SERPL-MCNC: 4 G/DL (ref 3.5–5.2)
ALBUMIN/GLOB SERPL: 1.3 G/DL
ALP SERPL-CCNC: 91 U/L (ref 39–117)
ALT SERPL W P-5'-P-CCNC: 14 U/L (ref 1–41)
ANION GAP SERPL CALCULATED.3IONS-SCNC: 8.3 MMOL/L (ref 5–15)
AST SERPL-CCNC: 18 U/L (ref 1–40)
BACTERIA UR QL AUTO: ABNORMAL /HPF
BASOPHILS # BLD AUTO: 0.06 10*3/MM3 (ref 0–0.2)
BASOPHILS NFR BLD AUTO: 0.6 % (ref 0–1.5)
BILIRUB SERPL-MCNC: 0.5 MG/DL (ref 0–1.2)
BILIRUB UR QL STRIP: NEGATIVE
BUN SERPL-MCNC: 40 MG/DL (ref 8–23)
BUN/CREAT SERPL: 31.7 (ref 7–25)
CALCIUM SPEC-SCNC: 9.6 MG/DL (ref 8.6–10.5)
CHLORIDE SERPL-SCNC: 106 MMOL/L (ref 98–107)
CLARITY UR: CLEAR
CO2 SERPL-SCNC: 25.7 MMOL/L (ref 22–29)
COLOR UR: YELLOW
CREAT SERPL-MCNC: 1.26 MG/DL (ref 0.76–1.27)
DEPRECATED RDW RBC AUTO: 54.2 FL (ref 37–54)
EOSINOPHIL # BLD AUTO: 0.21 10*3/MM3 (ref 0–0.4)
EOSINOPHIL NFR BLD AUTO: 2 % (ref 0.3–6.2)
ERYTHROCYTE [DISTWIDTH] IN BLOOD BY AUTOMATED COUNT: 15.2 % (ref 12.3–15.4)
GFR SERPL CREATININE-BSD FRML MDRD: 55 ML/MIN/1.73
GLOBULIN UR ELPH-MCNC: 3.1 GM/DL
GLUCOSE SERPL-MCNC: 113 MG/DL (ref 65–99)
GLUCOSE UR STRIP-MCNC: NEGATIVE MG/DL
HCT VFR BLD AUTO: 42.2 % (ref 37.5–51)
HGB BLD-MCNC: 13.9 G/DL (ref 13–17.7)
HGB UR QL STRIP.AUTO: ABNORMAL
HOLD SPECIMEN: NORMAL
HOLD SPECIMEN: NORMAL
HYALINE CASTS UR QL AUTO: ABNORMAL /LPF
IMM GRANULOCYTES # BLD AUTO: 0.05 10*3/MM3 (ref 0–0.05)
IMM GRANULOCYTES NFR BLD AUTO: 0.5 % (ref 0–0.5)
KETONES UR QL STRIP: NEGATIVE
LEUKOCYTE ESTERASE UR QL STRIP.AUTO: NEGATIVE
LIPASE SERPL-CCNC: 25 U/L (ref 13–60)
LYMPHOCYTES # BLD AUTO: 1.17 10*3/MM3 (ref 0.7–3.1)
LYMPHOCYTES NFR BLD AUTO: 10.9 % (ref 19.6–45.3)
MCH RBC QN AUTO: 32.5 PG (ref 26.6–33)
MCHC RBC AUTO-ENTMCNC: 32.9 G/DL (ref 31.5–35.7)
MCV RBC AUTO: 98.6 FL (ref 79–97)
MONOCYTES # BLD AUTO: 0.61 10*3/MM3 (ref 0.1–0.9)
MONOCYTES NFR BLD AUTO: 5.7 % (ref 5–12)
NEUTROPHILS NFR BLD AUTO: 8.59 10*3/MM3 (ref 1.7–7)
NEUTROPHILS NFR BLD AUTO: 80.3 % (ref 42.7–76)
NITRITE UR QL STRIP: NEGATIVE
NRBC BLD AUTO-RTO: 0 /100 WBC (ref 0–0.2)
PH UR STRIP.AUTO: <=5 [PH] (ref 5–8)
PLATELET # BLD AUTO: 309 10*3/MM3 (ref 140–450)
PMV BLD AUTO: 10.2 FL (ref 6–12)
POTASSIUM SERPL-SCNC: 4.4 MMOL/L (ref 3.5–5.2)
PROT SERPL-MCNC: 7.1 G/DL (ref 6–8.5)
PROT UR QL STRIP: NEGATIVE
RBC # BLD AUTO: 4.28 10*6/MM3 (ref 4.14–5.8)
RBC # UR: ABNORMAL /HPF
REF LAB TEST METHOD: ABNORMAL
SODIUM SERPL-SCNC: 140 MMOL/L (ref 136–145)
SP GR UR STRIP: >=1.03 (ref 1–1.03)
SQUAMOUS #/AREA URNS HPF: ABNORMAL /HPF
UROBILINOGEN UR QL STRIP: ABNORMAL
WBC # BLD AUTO: 10.69 10*3/MM3 (ref 3.4–10.8)
WBC UR QL AUTO: ABNORMAL /HPF
WHOLE BLOOD HOLD SPECIMEN: NORMAL
WHOLE BLOOD HOLD SPECIMEN: NORMAL

## 2021-02-20 PROCEDURE — 99284 EMERGENCY DEPT VISIT MOD MDM: CPT

## 2021-02-20 PROCEDURE — 25010000002 MORPHINE SULFATE (PF) 2 MG/ML SOLUTION: Performed by: EMERGENCY MEDICINE

## 2021-02-20 PROCEDURE — 96368 THER/DIAG CONCURRENT INF: CPT

## 2021-02-20 PROCEDURE — 80053 COMPREHEN METABOLIC PANEL: CPT | Performed by: EMERGENCY MEDICINE

## 2021-02-20 PROCEDURE — 96375 TX/PRO/DX INJ NEW DRUG ADDON: CPT

## 2021-02-20 PROCEDURE — 96366 THER/PROPH/DIAG IV INF ADDON: CPT

## 2021-02-20 PROCEDURE — 74177 CT ABD & PELVIS W/CONTRAST: CPT

## 2021-02-20 PROCEDURE — 25010000002 IOPAMIDOL 61 % SOLUTION: Performed by: EMERGENCY MEDICINE

## 2021-02-20 PROCEDURE — 25010000002 LEVOFLOXACIN PER 250 MG: Performed by: EMERGENCY MEDICINE

## 2021-02-20 PROCEDURE — 25010000002 ONDANSETRON PER 1 MG: Performed by: EMERGENCY MEDICINE

## 2021-02-20 PROCEDURE — 81001 URINALYSIS AUTO W/SCOPE: CPT | Performed by: EMERGENCY MEDICINE

## 2021-02-20 PROCEDURE — 83690 ASSAY OF LIPASE: CPT | Performed by: EMERGENCY MEDICINE

## 2021-02-20 PROCEDURE — 96365 THER/PROPH/DIAG IV INF INIT: CPT

## 2021-02-20 PROCEDURE — 85025 COMPLETE CBC W/AUTO DIFF WBC: CPT | Performed by: EMERGENCY MEDICINE

## 2021-02-20 RX ORDER — ONDANSETRON 4 MG/1
4 TABLET, ORALLY DISINTEGRATING ORAL EVERY 8 HOURS PRN
Qty: 12 TABLET | Refills: 0 | Status: SHIPPED | OUTPATIENT
Start: 2021-02-20 | End: 2021-07-30

## 2021-02-20 RX ORDER — METRONIDAZOLE 500 MG/1
500 TABLET ORAL 3 TIMES DAILY
Qty: 30 TABLET | Refills: 0 | Status: SHIPPED | OUTPATIENT
Start: 2021-02-20 | End: 2021-03-24

## 2021-02-20 RX ORDER — LEVOFLOXACIN 750 MG/1
750 TABLET ORAL DAILY
Qty: 10 TABLET | Refills: 0 | Status: SHIPPED | OUTPATIENT
Start: 2021-02-20 | End: 2021-03-24

## 2021-02-20 RX ORDER — DICYCLOMINE HCL 20 MG
20 TABLET ORAL EVERY 6 HOURS PRN
Qty: 10 TABLET | Refills: 0 | Status: SHIPPED | OUTPATIENT
Start: 2021-02-20 | End: 2021-07-30

## 2021-02-20 RX ORDER — SODIUM CHLORIDE 0.9 % (FLUSH) 0.9 %
10 SYRINGE (ML) INJECTION AS NEEDED
Status: DISCONTINUED | OUTPATIENT
Start: 2021-02-20 | End: 2021-02-20 | Stop reason: HOSPADM

## 2021-02-20 RX ORDER — HYDROCODONE BITARTRATE AND ACETAMINOPHEN 5; 325 MG/1; MG/1
1 TABLET ORAL EVERY 6 HOURS PRN
Qty: 10 TABLET | Refills: 0 | Status: SHIPPED | OUTPATIENT
Start: 2021-02-20 | End: 2021-02-24

## 2021-02-20 RX ORDER — ONDANSETRON 2 MG/ML
4 INJECTION INTRAMUSCULAR; INTRAVENOUS ONCE
Status: COMPLETED | OUTPATIENT
Start: 2021-02-20 | End: 2021-02-20

## 2021-02-20 RX ORDER — MORPHINE SULFATE 2 MG/ML
2 INJECTION, SOLUTION INTRAMUSCULAR; INTRAVENOUS ONCE
Status: COMPLETED | OUTPATIENT
Start: 2021-02-20 | End: 2021-02-20

## 2021-02-20 RX ORDER — LEVOFLOXACIN 5 MG/ML
750 INJECTION, SOLUTION INTRAVENOUS ONCE
Status: COMPLETED | OUTPATIENT
Start: 2021-02-20 | End: 2021-02-20

## 2021-02-20 RX ADMIN — ONDANSETRON 4 MG: 2 INJECTION INTRAMUSCULAR; INTRAVENOUS at 05:15

## 2021-02-20 RX ADMIN — METRONIDAZOLE 500 MG: 500 INJECTION, SOLUTION INTRAVENOUS at 07:29

## 2021-02-20 RX ADMIN — SODIUM CHLORIDE 1000 ML: 9 INJECTION, SOLUTION INTRAVENOUS at 05:10

## 2021-02-20 RX ADMIN — LEVOFLOXACIN 750 MG: 5 INJECTION, SOLUTION INTRAVENOUS at 07:36

## 2021-02-20 RX ADMIN — IOPAMIDOL 100 ML: 612 INJECTION, SOLUTION INTRAVENOUS at 05:34

## 2021-02-20 RX ADMIN — MORPHINE SULFATE 2 MG: 2 INJECTION, SOLUTION INTRAMUSCULAR; INTRAVENOUS at 05:11

## 2021-02-20 NOTE — ED PROVIDER NOTES
TRIAGE CHIEF COMPLAINT:     Nursing and triage notes reviewed    Chief Complaint   Patient presents with   • Abdominal Pain      HPI: Ted Saldivar is a 80 y.o. male who presents to the emergency department complaining of diarrhea and abdominal pain.  Patient states symptoms have been ongoing for the past 3 days.  He describes diffuse crampy and aching abdominal pain primarily in the lower abdomen but is diffusely present.  Has had multiple episodes of watery diarrhea daily.  Is been nauseated but denies vomiting.  Denies fevers or chills.  Patient states he was recently admitted to the hospital for similar symptoms where he was diagnosed with gastroenteritis.  No chest pain, coughing, shortness of breath.    REVIEW OF SYSTEMS: All other systems reviewed and are negative     PAST MEDICAL HISTORY:   Past Medical History:   Diagnosis Date   • Arthritis    • Hyperlipidemia    • Hypertension         FAMILY HISTORY:   Family History   Problem Relation Age of Onset   • Cancer Mother         lung   • Cancer Sister         leukemia        SOCIAL HISTORY:   Social History     Socioeconomic History   • Marital status:      Spouse name: Not on file   • Number of children: Not on file   • Years of education: Not on file   • Highest education level: Not on file   Tobacco Use   • Smoking status: Never Smoker   • Smokeless tobacco: Never Used   Substance and Sexual Activity   • Alcohol use: Yes     Frequency: Monthly or less     Comment: SOCIALLY   • Drug use: No   • Sexual activity: Defer        SURGICAL HISTORY:   Past Surgical History:   Procedure Laterality Date   • HERNIA REPAIR Bilateral 1983   • ROTATOR CUFF REPAIR Right 2006   • TOTAL HIP ARTHROPLASTY Left 11/2018        CURRENT MEDICATIONS:      Medication List      ASK your doctor about these medications    folic acid 1 MG tablet  Commonly known as: FOLVITE  Take 1 tablet by mouth Daily.     lisinopril 40 MG tablet  Commonly known as: PRINIVIL,ZESTRIL  Take 1 tablet  by mouth Daily.     meclizine 25 MG tablet  Commonly known as: ANTIVERT  Take 1 tablet by mouth 3 (Three) Times a Day As Needed for dizziness.     meloxicam 15 MG tablet  Commonly known as: MOBIC     methotrexate 2.5 MG tablet     simvastatin 10 MG tablet  Commonly known as: ZOCOR  Take 1 tablet by mouth Every Night.             ALLERGIES: Patient has no known allergies.     PHYSICAL EXAM:   VITAL SIGNS:   Vitals:    02/20/21 0425   BP: 169/80   Pulse: 85   Resp: 22   Temp: 98.9 °F (37.2 °C)   SpO2: 99%      CONSTITUTIONAL: Awake, oriented, appears non-toxic   HENT: Atraumatic, normocephalic, oral mucosa pink and moist, airway patent. Nares patent without drainage. External ears normal.   EYES: Conjunctiva clear   NECK: Trachea midline   CARDIOVASCULAR: Normal heart rate, Normal rhythm, No murmurs, rubs, gallops   PULMONARY/CHEST: Clear to auscultation, no rhonchi, wheezes, or rales. Symmetrical breath sounds   ABDOMINAL: Non-distended, soft, diffuse tenderness to palpation, no obvious rebound or guarding  NEUROLOGIC: Non-focal, moving all four extremities, no gross sensory or motor deficits.   EXTREMITIES: No clubbing, cyanosis, or edema   SKIN: Warm, Dry, No erythema, No rash     ED COURSE / MEDICAL DECISION MAKING:   Ted Saldivar is a 80 y.o. male who presents to the emergency department for evaluation of abdominal pain and diarrhea.  Patient nondistressed on arrival in the emergency department.  Vital signs are stable.  Physical examination reveals tenderness throughout the abdomen.  Will obtain labs and imaging for evaluation of his symptoms.    Laboratory tests are largely unremarkable including white blood cell count and renal function.  Lipase also within normal limits.  Patient unable to provide a stool specimen while in the emergency department.    CT scan imaging reveals some air-fluid levels in the bowels indicating likely enteritis but no obvious inflammation or sign of obstruction.    Patient felt  improved after IV fluids and pain medication.    After discussion with patient and family we will initiate some antibiotic therapy as a seem to help his symptoms last time.  Will reassess once antibiotics are administered but given he has remained stable and is feeling improved I anticipate likely discharge home.    DECISION TO DISCHARGE/ADMIT: see ED care timeline     FINAL IMPRESSION:   1 --enteritis  2 --abdominal pain  3 --     Electronically signed by: Tg Bolden MD, 2/20/2021 05:07 Tg Wade MD  02/20/21 0725

## 2021-02-22 ENCOUNTER — TELEPHONE (OUTPATIENT)
Dept: INTERNAL MEDICINE | Facility: CLINIC | Age: 81
End: 2021-02-22

## 2021-02-22 DIAGNOSIS — R10.84 GENERALIZED ABDOMINAL PAIN: Primary | ICD-10-CM

## 2021-02-24 ENCOUNTER — OFFICE VISIT (OUTPATIENT)
Dept: GASTROENTEROLOGY | Facility: CLINIC | Age: 81
End: 2021-02-24

## 2021-02-24 VITALS
TEMPERATURE: 98.4 F | HEART RATE: 72 BPM | BODY MASS INDEX: 27 KG/M2 | WEIGHT: 188.6 LBS | SYSTOLIC BLOOD PRESSURE: 110 MMHG | OXYGEN SATURATION: 99 % | DIASTOLIC BLOOD PRESSURE: 60 MMHG | HEIGHT: 70 IN

## 2021-02-24 DIAGNOSIS — R10.30 LOWER ABDOMINAL PAIN: ICD-10-CM

## 2021-02-24 DIAGNOSIS — Z12.11 ENCOUNTER FOR SCREENING FOR MALIGNANT NEOPLASM OF COLON: ICD-10-CM

## 2021-02-24 DIAGNOSIS — R19.7 DIARRHEA, UNSPECIFIED TYPE: Primary | ICD-10-CM

## 2021-02-24 DIAGNOSIS — R19.7 ACUTE DIARRHEA: ICD-10-CM

## 2021-02-24 DIAGNOSIS — R63.4 WEIGHT LOSS, ABNORMAL: ICD-10-CM

## 2021-02-24 PROCEDURE — 99204 OFFICE O/P NEW MOD 45 MIN: CPT | Performed by: INTERNAL MEDICINE

## 2021-02-24 NOTE — PROGRESS NOTES
New Patient Consult      Date: 2021   Patient Name: Ted Saldivar  MRN: [id number]  : 1940     Referring Physician: Sheila Natarajan MD    Chief Complaint   Patient presents with   • Abdominal Pain       History of Present Illness: Ted Saldivar is a 80 y.o. male who is here today to establish care with Gastroenterology for evaluation of his recent episode of diarrhea and abdominal pain.  Patient was in the emergency room 3 days ago with the lower abdominal pain and multiple loose stools with the diarrhea.   He states that he had a similar episode in December at that time he was admitted and treated as acute gastroenteritis with the Levaquin and Flagyl.. He was doing well until recently.  Denies any fever chills no nausea or vomiting.  CT scan of the abdomen pelvis done in the emergency room revealed fluid-filled small bowel suggesting possible enteritis.  His CT scan in 2020 did not reveal any significant normalities.  He was having more than 10 times per day. Now improved, had 3-4 times yesterday, stool getting softer. Pain improved. He was given levaquin but taking flagyl once daily.     This patient deny any hematochezia or melena.  He lost about 10 pounds recently. Pt denies nausea vomiting or odynophagia or dysphagia. There is no history of acid reflux. There is no history of anemia. No prior history of EGD. Last colonoscopy was  and no polyps removed. No family history of colon cancer or any GI malignancy. No history of any abdominal surgery. Denies alcohol abuse or cigarette smoking.   He has rheumatoid arthritis on methotrexate    Subjective      Past Medical History:   Past Medical History:   Diagnosis Date   • Arthritis    • Hyperlipidemia    • Hypertension        Past Surgical History:   Past Surgical History:   Procedure Laterality Date   • HERNIA REPAIR Bilateral    • ROTATOR CUFF REPAIR Right    • TOTAL HIP ARTHROPLASTY Left 2018       Family History:   Family  History   Problem Relation Age of Onset   • Cancer Mother         lung   • Cancer Sister         leukemia       Social History:   Social History     Socioeconomic History   • Marital status:      Spouse name: Not on file   • Number of children: Not on file   • Years of education: Not on file   • Highest education level: Not on file   Tobacco Use   • Smoking status: Never Smoker   • Smokeless tobacco: Never Used   Substance and Sexual Activity   • Alcohol use: Yes     Frequency: Monthly or less     Comment: SOCIALLY   • Drug use: No   • Sexual activity: Defer         Current Outpatient Medications:   •  dicyclomine (BENTYL) 20 MG tablet, Take 1 tablet by mouth Every 6 (Six) Hours As Needed (abdominal pain)., Disp: 10 tablet, Rfl: 0  •  folic acid (FOLVITE) 1 MG tablet, Take 1 tablet by mouth Daily., Disp: 90 tablet, Rfl: 1  •  levoFLOXacin (LEVAQUIN) 750 MG tablet, Take 1 tablet by mouth Daily., Disp: 10 tablet, Rfl: 0  •  lisinopril (PRINIVIL,ZESTRIL) 40 MG tablet, Take 1 tablet by mouth Daily., Disp: 90 tablet, Rfl: 3  •  meclizine (ANTIVERT) 25 MG tablet, Take 1 tablet by mouth 3 (Three) Times a Day As Needed for dizziness. (Patient taking differently: Take 25 mg by mouth As Needed for Dizziness.), Disp: 90 tablet, Rfl: 1  •  methotrexate 2.5 MG tablet, Take 8 tablets by mouth 1 (One) Time Per Week. On thursday, Disp: , Rfl:   •  metroNIDAZOLE (FLAGYL) 500 MG tablet, Take 1 tablet by mouth 3 (Three) Times a Day., Disp: 30 tablet, Rfl: 0  •  ondansetron ODT (ZOFRAN-ODT) 4 MG disintegrating tablet, Place 1 tablet on the tongue Every 8 (Eight) Hours As Needed for Nausea or Vomiting., Disp: 12 tablet, Rfl: 0  •  simvastatin (ZOCOR) 10 MG tablet, Take 1 tablet by mouth Every Night., Disp: 90 tablet, Rfl: 3    No Known Allergies    Review of Systems:   Review of Systems   Constitutional: Positive for unexpected weight loss. Negative for appetite change, fatigue and fever.   HENT: Negative for trouble swallowing.   "  Respiratory: Negative for cough, shortness of breath and wheezing.    Cardiovascular: Negative for chest pain, palpitations and leg swelling.   Gastrointestinal: Positive for abdominal pain and diarrhea. Negative for abdominal distention, anal bleeding, blood in stool, constipation, nausea, rectal pain, vomiting, GERD and indigestion.   Genitourinary: Negative for dysuria, frequency and hematuria.   Musculoskeletal: Negative for back pain and joint swelling.   Skin: Negative for color change, rash and skin lesions.   Neurological: Negative for dizziness, syncope, speech difficulty, weakness, headache and memory problem.   Hematological: Negative for adenopathy. Does not bruise/bleed easily.   Psychiatric/Behavioral: Negative for agitation, behavioral problems, suicidal ideas and depressed mood.       The following portions of the patient's history were reviewed and updated as appropriate: allergies, current medications, past family history, past medical history, past social history, past surgical history and problem list.    Objective     Physical Exam:  Vital Signs:   Vitals:    02/24/21 1445   BP: 110/60   Pulse: 72   Temp: 98.4 °F (36.9 °C)   TempSrc: Temporal   SpO2: 99%   Weight: 85.5 kg (188 lb 9.6 oz)   Height: 177.8 cm (70\")       Physical Exam  Vitals signs and nursing note reviewed.   Constitutional:       Appearance: Normal appearance. He is well-developed.   HENT:      Head: Normocephalic and atraumatic.      Right Ear: External ear normal.      Left Ear: External ear normal.   Eyes:      Conjunctiva/sclera: Conjunctivae normal.   Neck:      Musculoskeletal: Normal range of motion.      Thyroid: No thyromegaly.      Trachea: No tracheal deviation.   Cardiovascular:      Rate and Rhythm: Normal rate and regular rhythm.      Heart sounds: No murmur.   Pulmonary:      Effort: Pulmonary effort is normal. No respiratory distress.      Breath sounds: Normal breath sounds.   Abdominal:      General: Bowel " sounds are normal. There is no distension.      Palpations: Abdomen is soft. There is no mass.      Tenderness: There is no abdominal tenderness (Minimal discomfort in the left lower quadrant 20 palpation).      Hernia: No hernia is present.   Musculoskeletal: Normal range of motion.   Skin:     General: Skin is warm and dry.   Neurological:      Mental Status: He is alert and oriented to person, place, and time.      Cranial Nerves: No cranial nerve deficit.      Sensory: No sensory deficit.   Psychiatric:         Mood and Affect: Mood normal.         Behavior: Behavior normal.         Thought Content: Thought content normal.         Judgment: Judgment normal.         Results Review:   I have reviewed the patient's new clinical and imaging results and agree with the interpretation.     Admission on 02/20/2021, Discharged on 02/20/2021   Component Date Value Ref Range Status   • Glucose 02/20/2021 113* 65 - 99 mg/dL Final   • BUN 02/20/2021 40* 8 - 23 mg/dL Final   • Creatinine 02/20/2021 1.26  0.76 - 1.27 mg/dL Final   • Sodium 02/20/2021 140  136 - 145 mmol/L Final   • Potassium 02/20/2021 4.4  3.5 - 5.2 mmol/L Final   • Chloride 02/20/2021 106  98 - 107 mmol/L Final   • CO2 02/20/2021 25.7  22.0 - 29.0 mmol/L Final   • Calcium 02/20/2021 9.6  8.6 - 10.5 mg/dL Final   • Total Protein 02/20/2021 7.1  6.0 - 8.5 g/dL Final   • Albumin 02/20/2021 4.00  3.50 - 5.20 g/dL Final   • ALT (SGPT) 02/20/2021 14  1 - 41 U/L Final   • AST (SGOT) 02/20/2021 18  1 - 40 U/L Final   • Alkaline Phosphatase 02/20/2021 91  39 - 117 U/L Final   • Total Bilirubin 02/20/2021 0.5  0.0 - 1.2 mg/dL Final   • eGFR Non  Amer 02/20/2021 55* >60 mL/min/1.73 Final   • Globulin 02/20/2021 3.1  gm/dL Final   • A/G Ratio 02/20/2021 1.3  g/dL Final   • BUN/Creatinine Ratio 02/20/2021 31.7* 7.0 - 25.0 Final   • Anion Gap 02/20/2021 8.3  5.0 - 15.0 mmol/L Final   • Lipase 02/20/2021 25  13 - 60 U/L Final   • Color, UA 02/20/2021 Yellow  Yellow,  Straw Final   • Appearance, UA 02/20/2021 Clear  Clear Final   • pH, UA 02/20/2021 <=5.0  5.0 - 8.0 Final   • Specific Gravity, UA 02/20/2021 >=1.030  1.005 - 1.030 Final   • Glucose, UA 02/20/2021 Negative  Negative Final   • Ketones, UA 02/20/2021 Negative  Negative Final   • Bilirubin, UA 02/20/2021 Negative  Negative Final   • Blood, UA 02/20/2021 Trace* Negative Final   • Protein, UA 02/20/2021 Negative  Negative Final   • Leuk Esterase, UA 02/20/2021 Negative  Negative Final   • Nitrite, UA 02/20/2021 Negative  Negative Final   • Urobilinogen, UA 02/20/2021 0.2 E.U./dL  0.2 - 1.0 E.U./dL Final   • Extra Tube 02/20/2021 hold for add-on   Final    Auto resulted   • Extra Tube 02/20/2021 Hold for add-ons.   Final    Auto resulted.   • Extra Tube 02/20/2021 hold for add-on   Final    Auto resulted   • Extra Tube 02/20/2021 Hold for add-ons.   Final    Auto resulted.   • WBC 02/20/2021 10.69  3.40 - 10.80 10*3/mm3 Final   • RBC 02/20/2021 4.28  4.14 - 5.80 10*6/mm3 Final   • Hemoglobin 02/20/2021 13.9  13.0 - 17.7 g/dL Final   • Hematocrit 02/20/2021 42.2  37.5 - 51.0 % Final   • MCV 02/20/2021 98.6* 79.0 - 97.0 fL Final   • MCH 02/20/2021 32.5  26.6 - 33.0 pg Final   • MCHC 02/20/2021 32.9  31.5 - 35.7 g/dL Final   • RDW 02/20/2021 15.2  12.3 - 15.4 % Final   • RDW-SD 02/20/2021 54.2* 37.0 - 54.0 fl Final   • MPV 02/20/2021 10.2  6.0 - 12.0 fL Final   • Platelets 02/20/2021 309  140 - 450 10*3/mm3 Final   • Neutrophil % 02/20/2021 80.3* 42.7 - 76.0 % Final   • Lymphocyte % 02/20/2021 10.9* 19.6 - 45.3 % Final   • Monocyte % 02/20/2021 5.7  5.0 - 12.0 % Final   • Eosinophil % 02/20/2021 2.0  0.3 - 6.2 % Final   • Basophil % 02/20/2021 0.6  0.0 - 1.5 % Final   • Immature Grans % 02/20/2021 0.5  0.0 - 0.5 % Final   • Neutrophils, Absolute 02/20/2021 8.59* 1.70 - 7.00 10*3/mm3 Final   • Lymphocytes, Absolute 02/20/2021 1.17  0.70 - 3.10 10*3/mm3 Final   • Monocytes, Absolute 02/20/2021 0.61  0.10 - 0.90 10*3/mm3 Final    • Eosinophils, Absolute 02/20/2021 0.21  0.00 - 0.40 10*3/mm3 Final   • Basophils, Absolute 02/20/2021 0.06  0.00 - 0.20 10*3/mm3 Final   • Immature Grans, Absolute 02/20/2021 0.05  0.00 - 0.05 10*3/mm3 Final   • nRBC 02/20/2021 0.0  0.0 - 0.2 /100 WBC Final   • RBC, UA 02/20/2021 6-12* None Seen /HPF Final   • WBC, UA 02/20/2021 0-2* None Seen /HPF Final   • Bacteria, UA 02/20/2021 Trace* None Seen /HPF Final   • Squamous Epithelial Cells, UA 02/20/2021 0-2  None Seen, 0-2 /HPF Final   • Hyaline Casts, UA 02/20/2021 0-2  None Seen /LPF Final   • Methodology 02/20/2021 Manual Light Microscopy   Final   Admission on 12/26/2020, Discharged on 12/28/2020   Component Date Value Ref Range Status   • Glucose 12/26/2020 113* 65 - 99 mg/dL Final   • BUN 12/26/2020 26* 8 - 23 mg/dL Final   • Creatinine 12/26/2020 1.09  0.76 - 1.27 mg/dL Final   • Sodium 12/26/2020 136  136 - 145 mmol/L Final   • Potassium 12/26/2020 4.7  3.5 - 5.2 mmol/L Final    Slight hemolysis detected by analyzer. Results may be affected.   • Chloride 12/26/2020 105  98 - 107 mmol/L Final   • CO2 12/26/2020 21.4* 22.0 - 29.0 mmol/L Final   • Calcium 12/26/2020 8.8  8.6 - 10.5 mg/dL Final   • Total Protein 12/26/2020 6.7  6.0 - 8.5 g/dL Final   • Albumin 12/26/2020 3.60  3.50 - 5.20 g/dL Final   • ALT (SGPT) 12/26/2020 12  1 - 41 U/L Final   • AST (SGOT) 12/26/2020 15  1 - 40 U/L Final   • Alkaline Phosphatase 12/26/2020 86  39 - 117 U/L Final   • Total Bilirubin 12/26/2020 0.8  0.0 - 1.2 mg/dL Final   • eGFR Non African Amer 12/26/2020 65  >60 mL/min/1.73 Final   • Globulin 12/26/2020 3.1  gm/dL Final   • A/G Ratio 12/26/2020 1.2  g/dL Final   • BUN/Creatinine Ratio 12/26/2020 23.9  7.0 - 25.0 Final   • Anion Gap 12/26/2020 9.6  5.0 - 15.0 mmol/L Final   • Lipase 12/26/2020 70* 13 - 60 U/L Final   • Lactate 12/26/2020 1.6  0.5 - 2.0 mmol/L Final   • WBC 12/26/2020 18.45* 3.40 - 10.80 10*3/mm3 Final   • RBC 12/26/2020 4.20  4.14 - 5.80 10*6/mm3 Final    • Hemoglobin 12/26/2020 13.6  13.0 - 17.7 g/dL Final   • Hematocrit 12/26/2020 42.0  37.5 - 51.0 % Final   • MCV 12/26/2020 100.0* 79.0 - 97.0 fL Final   • MCH 12/26/2020 32.4  26.6 - 33.0 pg Final   • MCHC 12/26/2020 32.4  31.5 - 35.7 g/dL Final   • RDW 12/26/2020 14.4  12.3 - 15.4 % Final   • RDW-SD 12/26/2020 52.0  37.0 - 54.0 fl Final   • MPV 12/26/2020 11.1  6.0 - 12.0 fL Final   • Platelets 12/26/2020 278  140 - 450 10*3/mm3 Final   • Neutrophil % 12/26/2020 84.7* 42.7 - 76.0 % Final   • Lymphocyte % 12/26/2020 6.2* 19.6 - 45.3 % Final   • Monocyte % 12/26/2020 6.9  5.0 - 12.0 % Final   • Eosinophil % 12/26/2020 0.9  0.3 - 6.2 % Final   • Basophil % 12/26/2020 0.4  0.0 - 1.5 % Final   • Immature Grans % 12/26/2020 0.9* 0.0 - 0.5 % Final   • Neutrophils, Absolute 12/26/2020 15.64* 1.70 - 7.00 10*3/mm3 Final   • Lymphocytes, Absolute 12/26/2020 1.14  0.70 - 3.10 10*3/mm3 Final   • Monocytes, Absolute 12/26/2020 1.27* 0.10 - 0.90 10*3/mm3 Final   • Eosinophils, Absolute 12/26/2020 0.16  0.00 - 0.40 10*3/mm3 Final   • Basophils, Absolute 12/26/2020 0.07  0.00 - 0.20 10*3/mm3 Final   • Immature Grans, Absolute 12/26/2020 0.17* 0.00 - 0.05 10*3/mm3 Final   • nRBC 12/26/2020 0.0  0.0 - 0.2 /100 WBC Final   • RBC Morphology 12/26/2020 Normal  Normal Final   • WBC Morphology 12/26/2020 Normal  Normal Final   • Platelet Estimate 12/26/2020 Adequate  Normal Final    No microclots   • Large Platelets 12/26/2020 Slight/1+  None Seen Final   • Campylobacter 12/27/2020 Not Detected  Not Detected Final   • Plesiomonas shigelloides 12/27/2020 Not Detected  Not Detected Final   • Salmonella 12/27/2020 Not Detected  Not Detected Final   • Vibrio 12/27/2020 Not Detected  Not Detected Final   • Vibrio cholerae 12/27/2020 Not Detected  Not Detected Final   • Yersinia enterocolitica 12/27/2020 Not Detected  Not Detected Final   • Enteroaggregative E. coli (EAEC) 12/27/2020 Not Detected  Not Detected Final   • Enteropathogenic E.  coli (EPEC) 12/27/2020 Not Detected  Not Detected Final   • Enterotoxigenic E. coli (ETEC) lt/* 12/27/2020 Not Detected  Not Detected Final   • Shiga-like toxin-producing E. coli* 12/27/2020 Not Detected  Not Detected Final   • Shigella/Enteroinvasive E. coli (E* 12/27/2020 Not Detected  Not Detected Final   • Cryptosporidium 12/27/2020 Not Detected  Not Detected Final   • Cyclospora cayetanensis 12/27/2020 Not Detected  Not Detected Final   • Entamoeba histolytica 12/27/2020 Not Detected  Not Detected Final   • Giardia lamblia 12/27/2020 Not Detected  Not Detected Final   • Adenovirus F40/41 12/27/2020 Not Detected  Not Detected Final   • Astrovirus 12/27/2020 Not Detected  Not Detected Final   • Norovirus GI/GII 12/27/2020 Not Detected  Not Detected Final   • Rotavirus A 12/27/2020 Not Detected  Not Detected Final   • Sapovirus (I, II, IV or V) 12/27/2020 Not Detected  Not Detected Final   • COVID19 12/26/2020 Not Detected  Not Detected - Ref. Range Final   • WBC 12/27/2020 11.03* 3.40 - 10.80 10*3/mm3 Final   • RBC 12/27/2020 3.98* 4.14 - 5.80 10*6/mm3 Final   • Hemoglobin 12/27/2020 12.8* 13.0 - 17.7 g/dL Final   • Hematocrit 12/27/2020 38.9  37.5 - 51.0 % Final   • MCV 12/27/2020 97.7* 79.0 - 97.0 fL Final   • MCH 12/27/2020 32.2  26.6 - 33.0 pg Final   • MCHC 12/27/2020 32.9  31.5 - 35.7 g/dL Final   • RDW 12/27/2020 14.4  12.3 - 15.4 % Final   • RDW-SD 12/27/2020 51.4  37.0 - 54.0 fl Final   • MPV 12/27/2020 10.8  6.0 - 12.0 fL Final   • Platelets 12/27/2020 296  140 - 450 10*3/mm3 Final   • Neutrophil % 12/27/2020 76.5* 42.7 - 76.0 % Final   • Lymphocyte % 12/27/2020 13.1* 19.6 - 45.3 % Final   • Monocyte % 12/27/2020 6.9  5.0 - 12.0 % Final   • Eosinophil % 12/27/2020 2.5  0.3 - 6.2 % Final   • Basophil % 12/27/2020 0.5  0.0 - 1.5 % Final   • Immature Grans % 12/27/2020 0.5  0.0 - 0.5 % Final   • Neutrophils, Absolute 12/27/2020 8.42* 1.70 - 7.00 10*3/mm3 Final   • Lymphocytes, Absolute 12/27/2020 1.45   0.70 - 3.10 10*3/mm3 Final   • Monocytes, Absolute 12/27/2020 0.76  0.10 - 0.90 10*3/mm3 Final   • Eosinophils, Absolute 12/27/2020 0.28  0.00 - 0.40 10*3/mm3 Final   • Basophils, Absolute 12/27/2020 0.06  0.00 - 0.20 10*3/mm3 Final   • Immature Grans, Absolute 12/27/2020 0.06* 0.00 - 0.05 10*3/mm3 Final   • nRBC 12/27/2020 0.0  0.0 - 0.2 /100 WBC Final   • Glucose 12/27/2020 87  65 - 99 mg/dL Final   • BUN 12/27/2020 24* 8 - 23 mg/dL Final   • Creatinine 12/27/2020 0.95  0.76 - 1.27 mg/dL Final   • Sodium 12/27/2020 137  136 - 145 mmol/L Final   • Potassium 12/27/2020 4.2  3.5 - 5.2 mmol/L Final   • Chloride 12/27/2020 105  98 - 107 mmol/L Final   • CO2 12/27/2020 21.3* 22.0 - 29.0 mmol/L Final   • Calcium 12/27/2020 8.6  8.6 - 10.5 mg/dL Final   • Total Protein 12/27/2020 6.3  6.0 - 8.5 g/dL Final   • Albumin 12/27/2020 3.50  3.50 - 5.20 g/dL Final   • ALT (SGPT) 12/27/2020 10  1 - 41 U/L Final   • AST (SGOT) 12/27/2020 13  1 - 40 U/L Final   • Alkaline Phosphatase 12/27/2020 82  39 - 117 U/L Final   • Total Bilirubin 12/27/2020 0.8  0.0 - 1.2 mg/dL Final   • eGFR Non African Amer 12/27/2020 76  >60 mL/min/1.73 Final   • Globulin 12/27/2020 2.8  gm/dL Final   • A/G Ratio 12/27/2020 1.3  g/dL Final   • BUN/Creatinine Ratio 12/27/2020 25.3* 7.0 - 25.0 Final   • Anion Gap 12/27/2020 10.7  5.0 - 15.0 mmol/L Final   • Lipase 12/27/2020 19  13 - 60 U/L Final   • Troponin T 12/27/2020 <0.010  0.000 - 0.030 ng/mL Final   • Glucose 12/28/2020 94  65 - 99 mg/dL Final   • BUN 12/28/2020 19  8 - 23 mg/dL Final   • Creatinine 12/28/2020 1.00  0.76 - 1.27 mg/dL Final   • Sodium 12/28/2020 137  136 - 145 mmol/L Final   • Potassium 12/28/2020 4.2  3.5 - 5.2 mmol/L Final   • Chloride 12/28/2020 106  98 - 107 mmol/L Final   • CO2 12/28/2020 20.0* 22.0 - 29.0 mmol/L Final   • Calcium 12/28/2020 8.4* 8.6 - 10.5 mg/dL Final   • Total Protein 12/28/2020 6.3  6.0 - 8.5 g/dL Final   • Albumin 12/28/2020 3.30* 3.50 - 5.20 g/dL Final   •  ALT (SGPT) 12/28/2020 9  1 - 41 U/L Final   • AST (SGOT) 12/28/2020 12  1 - 40 U/L Final   • Alkaline Phosphatase 12/28/2020 82  39 - 117 U/L Final   • Total Bilirubin 12/28/2020 0.6  0.0 - 1.2 mg/dL Final   • eGFR Non African Amer 12/28/2020 72  >60 mL/min/1.73 Final   • Globulin 12/28/2020 3.0  gm/dL Final   • A/G Ratio 12/28/2020 1.1  g/dL Final   • BUN/Creatinine Ratio 12/28/2020 19.0  7.0 - 25.0 Final   • Anion Gap 12/28/2020 11.0  5.0 - 15.0 mmol/L Final   • Lipase 12/28/2020 17  13 - 60 U/L Final   • Troponin T 12/28/2020 <0.010  0.000 - 0.030 ng/mL Final   • WBC 12/28/2020 11.90* 3.40 - 10.80 10*3/mm3 Final   • RBC 12/28/2020 3.97* 4.14 - 5.80 10*6/mm3 Final   • Hemoglobin 12/28/2020 12.8* 13.0 - 17.7 g/dL Final   • Hematocrit 12/28/2020 38.1  37.5 - 51.0 % Final   • MCV 12/28/2020 96.0  79.0 - 97.0 fL Final   • MCH 12/28/2020 32.2  26.6 - 33.0 pg Final   • MCHC 12/28/2020 33.6  31.5 - 35.7 g/dL Final   • RDW 12/28/2020 14.0  12.3 - 15.4 % Final   • RDW-SD 12/28/2020 48.6  37.0 - 54.0 fl Final   • MPV 12/28/2020 10.8  6.0 - 12.0 fL Final   • Platelets 12/28/2020 282  140 - 450 10*3/mm3 Final   • Lactate 12/28/2020 0.8  0.5 - 2.0 mmol/L Final   Admission on 12/25/2020, Discharged on 12/26/2020   Component Date Value Ref Range Status   • Glucose 12/25/2020 102* 65 - 99 mg/dL Final   • BUN 12/25/2020 28* 8 - 23 mg/dL Final   • Creatinine 12/25/2020 1.12  0.76 - 1.27 mg/dL Final   • Sodium 12/25/2020 138  136 - 145 mmol/L Final   • Potassium 12/25/2020 4.6  3.5 - 5.2 mmol/L Final   • Chloride 12/25/2020 102  98 - 107 mmol/L Final   • CO2 12/25/2020 25.1  22.0 - 29.0 mmol/L Final   • Calcium 12/25/2020 9.3  8.6 - 10.5 mg/dL Final   • Total Protein 12/25/2020 7.2  6.0 - 8.5 g/dL Final   • Albumin 12/25/2020 4.20  3.50 - 5.20 g/dL Final   • ALT (SGPT) 12/25/2020 12  1 - 41 U/L Final   • AST (SGOT) 12/25/2020 15  1 - 40 U/L Final   • Alkaline Phosphatase 12/25/2020 91  39 - 117 U/L Final   • Total Bilirubin  12/25/2020 0.7  0.0 - 1.2 mg/dL Final   • eGFR Non African Amer 12/25/2020 63  >60 mL/min/1.73 Final   • Globulin 12/25/2020 3.0  gm/dL Final   • A/G Ratio 12/25/2020 1.4  g/dL Final   • BUN/Creatinine Ratio 12/25/2020 25.0  7.0 - 25.0 Final   • Anion Gap 12/25/2020 10.9  5.0 - 15.0 mmol/L Final   • Lipase 12/25/2020 49  13 - 60 U/L Final   • WBC 12/25/2020 16.11* 3.40 - 10.80 10*3/mm3 Final   • RBC 12/25/2020 4.32  4.14 - 5.80 10*6/mm3 Final   • Hemoglobin 12/25/2020 14.1  13.0 - 17.7 g/dL Final   • Hematocrit 12/25/2020 42.5  37.5 - 51.0 % Final   • MCV 12/25/2020 98.4* 79.0 - 97.0 fL Final   • MCH 12/25/2020 32.6  26.6 - 33.0 pg Final   • MCHC 12/25/2020 33.2  31.5 - 35.7 g/dL Final   • RDW 12/25/2020 14.2  12.3 - 15.4 % Final   • RDW-SD 12/25/2020 51.5  37.0 - 54.0 fl Final   • MPV 12/25/2020 10.6  6.0 - 12.0 fL Final   • Platelets 12/25/2020 332  140 - 450 10*3/mm3 Final   • Neutrophil % 12/25/2020 78.5* 42.7 - 76.0 % Final   • Lymphocyte % 12/25/2020 10.2* 19.6 - 45.3 % Final   • Monocyte % 12/25/2020 8.6  5.0 - 12.0 % Final   • Eosinophil % 12/25/2020 1.7  0.3 - 6.2 % Final   • Basophil % 12/25/2020 0.4  0.0 - 1.5 % Final   • Immature Grans % 12/25/2020 0.6* 0.0 - 0.5 % Final   • Neutrophils, Absolute 12/25/2020 12.67* 1.70 - 7.00 10*3/mm3 Final   • Lymphocytes, Absolute 12/25/2020 1.64  0.70 - 3.10 10*3/mm3 Final   • Monocytes, Absolute 12/25/2020 1.38* 0.10 - 0.90 10*3/mm3 Final   • Eosinophils, Absolute 12/25/2020 0.27  0.00 - 0.40 10*3/mm3 Final   • Basophils, Absolute 12/25/2020 0.06  0.00 - 0.20 10*3/mm3 Final   • Immature Grans, Absolute 12/25/2020 0.09* 0.00 - 0.05 10*3/mm3 Final   • nRBC 12/25/2020 0.0  0.0 - 0.2 /100 WBC Final   Office Visit on 12/17/2020   Component Date Value Ref Range Status   • WBC 12/17/2020 8.89  3.40 - 10.80 10*3/mm3 Final   • RBC 12/17/2020 4.16  4.14 - 5.80 10*6/mm3 Final   • Hemoglobin 12/17/2020 13.5  13.0 - 17.7 g/dL Final   • Hematocrit 12/17/2020 40.2  37.5 - 51.0 %  Final   • MCV 12/17/2020 96.6  79.0 - 97.0 fL Final   • MCH 12/17/2020 32.5  26.6 - 33.0 pg Final   • MCHC 12/17/2020 33.6  31.5 - 35.7 g/dL Final   • RDW 12/17/2020 13.2  12.3 - 15.4 % Final   • Platelets 12/17/2020 282  140 - 450 10*3/mm3 Final   • Neutrophil Rel % 12/17/2020 74.2  42.7 - 76.0 % Final   • Lymphocyte Rel % 12/17/2020 13.4* 19.6 - 45.3 % Final   • Monocyte Rel % 12/17/2020 9.0  5.0 - 12.0 % Final   • Eosinophil Rel % 12/17/2020 1.8  0.3 - 6.2 % Final   • Basophil Rel % 12/17/2020 0.9  0.0 - 1.5 % Final   • Neutrophils Absolute 12/17/2020 6.60  1.70 - 7.00 10*3/mm3 Final   • Lymphocytes Absolute 12/17/2020 1.19  0.70 - 3.10 10*3/mm3 Final   • Monocytes Absolute 12/17/2020 0.80  0.10 - 0.90 10*3/mm3 Final   • Eosinophils Absolute 12/17/2020 0.16  0.00 - 0.40 10*3/mm3 Final   • Basophils Absolute 12/17/2020 0.08  0.00 - 0.20 10*3/mm3 Final   • Immature Granulocyte Rel % 12/17/2020 0.7* 0.0 - 0.5 % Final   • Immature Grans Absolute 12/17/2020 0.06* 0.00 - 0.05 10*3/mm3 Final   • nRBC 12/17/2020 0.0  0.0 - 0.2 /100 WBC Final   • Glucose 12/17/2020 96  65 - 99 mg/dL Final   • BUN 12/17/2020 24* 8 - 23 mg/dL Final   • Creatinine 12/17/2020 1.00  0.76 - 1.27 mg/dL Final   • eGFR Non  Am 12/17/2020 72  >60 mL/min/1.73 Final    Comment: The MDRD GFR formula is only valid for adults with stable  renal function between ages 18 and 70.     • eGFR  Am 12/17/2020 87  >60 mL/min/1.73 Final   • BUN/Creatinine Ratio 12/17/2020 24.0  7.0 - 25.0 Final   • Sodium 12/17/2020 143  136 - 145 mmol/L Final   • Potassium 12/17/2020 5.1  3.5 - 5.2 mmol/L Final   • Chloride 12/17/2020 106  98 - 107 mmol/L Final   • Total CO2 12/17/2020 27.6  22.0 - 29.0 mmol/L Final   • Calcium 12/17/2020 9.7  8.6 - 10.5 mg/dL Final   • Total Protein 12/17/2020 7.1  6.0 - 8.5 g/dL Final   • Albumin 12/17/2020 4.20  3.50 - 5.20 g/dL Final   • Globulin 12/17/2020 2.9  gm/dL Final   • A/G Ratio 12/17/2020 1.4  g/dL Final   • Total  Bilirubin 12/17/2020 0.4  0.0 - 1.2 mg/dL Final   • Alkaline Phosphatase 12/17/2020 89  39 - 117 U/L Final   • AST (SGOT) 12/17/2020 20  1 - 40 U/L Final   • ALT (SGPT) 12/17/2020 18  1 - 41 U/L Final   • Total Cholesterol 12/17/2020 167  0 - 200 mg/dL Final    Comment: Cholesterol Reference Ranges  ([place] Department of Health and Human Services ATP III  Classifications)  Desirable          <200 mg/dL  Borderline High    200-239 mg/dL  High Risk          >240 mg/dL  Triglyceride Reference Ranges  ([place] Department of Health and Human Services ATP III  Classifications)  Normal           <150 mg/dL  Borderline High  150-199 mg/dL  High             200-499 mg/dL  Very High        >500 mg/dL  HDL Reference Ranges  ([place] Department of Health and Human Services ATP III  Classifcations)  Low     <40 mg/dl (major risk factor for CHD)  High    >60 mg/dl ('negative' risk factor for CHD)  LDL Reference Ranges  ([place] Department of Health and Human Services ATP III  Classifcations)  Optimal          <100 mg/dL  Near Optimal     100-129 mg/dL  Borderline High  130-159 mg/dL  High             160-189 mg/dL  Very High        >189 mg/dL     • Triglycerides 12/17/2020 81  0 - 150 mg/dL Final   • HDL Cholesterol 12/17/2020 47  40 - 60 mg/dL Final   • VLDL Cholesterol Edwin 12/17/2020 15  5 - 40 mg/dL Final   • LDL Chol Calc ([facility]) 12/17/2020 105* 0 - 100 mg/dL Final      Ct Abdomen Pelvis Without Contrast    Result Date: 12/25/2020  No acute disease. Authenticated by Nicholas Samuels M.D. 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  D:  12/17/2020 E:  12/17/2020  This report was finalized on 12/17/2020 5:15 PM by Dr. Black Rodas.      Ct Abdomen Pelvis With Contrast    Result Date: 2/20/2021  IMPRESSION: Air-fluid levels within small bowel,  without significant distention.  Possible enteritis.  No high-grade obstruction. Authenticated by Tommie Sierra MD on 02/20/2021 06:25:07 AM      Assessment / Plan      Assessment & Plan:  1. Diarrhea, unspecified type  2. Acute diarrhea  3. Lower abdominal pain  Suspected viral diarrhea to rule out C. difficile colitis  This is a second episode of diarrhea he had since December 2020.  He was admitted in December with acute diarrhea and is GI panel was negative for any common bacterial infections.  I did not see any C. difficile colitis test done at that time.  He comes back this time again with acute diarrhea and lower abdominal pain without any fever chills.  He was been started on Levaquin Flagyl in the emergency room.  His symptoms have improved now he is detention through his treatment.  He still has some loose stool about 3-4 times daily without any blood or melena.  His lab work done in the emergency room was unremarkable.  CT scan abdomen pelvis done revealed fluid-filled small bowel loops suggesting possible enteritis.    Clinically this appears to be still an infectious origin.  Rare possibility of microscopic colitis and other pathology need to be ruled out if his diarrhea persists  We will get a stool for C. difficile colitis  Will consider colonoscopy if his symptoms persist after 4 weeks.  We will complete his Levaquin Flagyl course now    - Clostridium Difficile Toxin, PCR - Stool, Per Rectum; Future    4. Weight loss, abnormal  He lost about 10 pounds recently.  This is appears to be recent from his recent hospital admission and multiple diarrheas.  His CT scan abdomen pelvis done did not reveal any significant intra-abdominal pathology.   Will monitor for now    5.  Colon cancer screening  His last colonoscopy was about 15 years ago and was been told normal.   He needs a screening colonoscopy.  Patient at this time wants to wait for a while and decide next visit  We will discuss again at his next  visit        Follow Up:   Return in about 4 weeks (around 3/24/2021).    Gaurang Dillon MD  [facility]  2/24/2021  15:24 EST    Please note that portions of this note may have been completed with a voice recognition program.

## 2021-02-25 LAB — C DIFF TOX GENS STL QL NAA+PROBE: NOT DETECTED

## 2021-02-25 PROCEDURE — 87493 C DIFF AMPLIFIED PROBE: CPT | Performed by: INTERNAL MEDICINE

## 2021-03-24 ENCOUNTER — OFFICE VISIT (OUTPATIENT)
Dept: GASTROENTEROLOGY | Facility: CLINIC | Age: 81
End: 2021-03-24

## 2021-03-24 VITALS
HEART RATE: 63 BPM | DIASTOLIC BLOOD PRESSURE: 72 MMHG | WEIGHT: 191.8 LBS | TEMPERATURE: 98.4 F | OXYGEN SATURATION: 98 % | SYSTOLIC BLOOD PRESSURE: 128 MMHG | HEIGHT: 70 IN | BODY MASS INDEX: 27.46 KG/M2

## 2021-03-24 DIAGNOSIS — A08.4 VIRAL ENTERITIS: ICD-10-CM

## 2021-03-24 DIAGNOSIS — R63.4 UNINTENTIONAL WEIGHT LOSS: ICD-10-CM

## 2021-03-24 DIAGNOSIS — Z12.11 ENCOUNTER FOR SCREENING FOR MALIGNANT NEOPLASM OF COLON: Primary | ICD-10-CM

## 2021-03-24 PROCEDURE — 99213 OFFICE O/P EST LOW 20 MIN: CPT | Performed by: INTERNAL MEDICINE

## 2021-03-24 NOTE — PROGRESS NOTES
Follow Up Note     Date: 2021   Patient Name: Ted Saldivar  MRN: [id number]  : 1940     Referring Physician: Sheila Natarajan MD    Chief Complaint:    Chief Complaint   Patient presents with   • Follow-up   • Diarrhea       Interval History:   3/24/2021  Ted Saldivar is a 80 y.o. male who is here today for follow up for first ongoing diarrhea.  H that he has diarrhea has resolved now he is having regular bowel movement.   He is here to discuss his recent stool studies and further follow-up.     2021  Ted Saldivar is a 80 y.o. male who is here today to establish care with Gastroenterology for evaluation of his recent episode of diarrhea and abdominal pain.  Patient was in the emergency room 3 days ago with the lower abdominal pain and multiple loose stools with the diarrhea.   He states that he had a similar episode in December at that time he was admitted and treated as acute gastroenteritis with the Levaquin and Flagyl.. He was doing well until recently.  Denies any fever chills no nausea or vomiting.  CT scan of the abdomen pelvis done in the emergency room revealed fluid-filled small bowel suggesting possible enteritis.  His CT scan in 2020 did not reveal any significant normalities.  He was having more than 10 times per day. Now improved, had 3-4 times yesterday, stool getting softer. Pain improved. He was given levaquin but taking flagyl once daily.      This patient deny any hematochezia or melena.  He lost about 10 pounds recently. Pt denies nausea vomiting or odynophagia or dysphagia. There is no history of acid reflux. There is no history of anemia. No prior history of EGD. Last colonoscopy was  and no polyps removed. No family history of colon cancer or any GI malignancy. No history of any abdominal surgery. Denies alcohol abuse or cigarette smoking.   He has rheumatoid arthritis on methotrexate    Subjective      Past Medical History:   Past Medical History:   Diagnosis Date    • Arthritis    • Hyperlipidemia    • Hypertension      Past Surgical History:   Past Surgical History:   Procedure Laterality Date   • HERNIA REPAIR Bilateral 1983   • ROTATOR CUFF REPAIR Right 2006   • TOTAL HIP ARTHROPLASTY Left 11/2018       Family History:   Family History   Problem Relation Age of Onset   • Cancer Mother         lung   • Cancer Sister         leukemia       Social History:   Social History     Socioeconomic History   • Marital status:      Spouse name: Not on file   • Number of children: Not on file   • Years of education: Not on file   • Highest education level: Not on file   Tobacco Use   • Smoking status: Never Smoker   • Smokeless tobacco: Never Used   Substance and Sexual Activity   • Alcohol use: Yes     Comment: SOCIALLY   • Drug use: No   • Sexual activity: Defer       Medications:     Current Outpatient Medications:   •  dicyclomine (BENTYL) 20 MG tablet, Take 1 tablet by mouth Every 6 (Six) Hours As Needed (abdominal pain)., Disp: 10 tablet, Rfl: 0  •  folic acid (FOLVITE) 1 MG tablet, Take 1 tablet by mouth Daily., Disp: 90 tablet, Rfl: 1  •  lisinopril (PRINIVIL,ZESTRIL) 40 MG tablet, Take 1 tablet by mouth Daily., Disp: 90 tablet, Rfl: 3  •  meclizine (ANTIVERT) 25 MG tablet, Take 1 tablet by mouth 3 (Three) Times a Day As Needed for dizziness. (Patient taking differently: Take 25 mg by mouth As Needed for Dizziness.), Disp: 90 tablet, Rfl: 1  •  methotrexate 2.5 MG tablet, Take 8 tablets by mouth 1 (One) Time Per Week. On thursday, Disp: , Rfl:   •  ondansetron ODT (ZOFRAN-ODT) 4 MG disintegrating tablet, Place 1 tablet on the tongue Every 8 (Eight) Hours As Needed for Nausea or Vomiting., Disp: 12 tablet, Rfl: 0  •  simvastatin (ZOCOR) 10 MG tablet, Take 1 tablet by mouth Every Night., Disp: 90 tablet, Rfl: 3    Allergies:   No Known Allergies    Review of Systems:   Review of Systems   Constitutional: Negative for appetite change, fatigue and unexpected weight loss.  "  HENT: Negative for trouble swallowing.    Gastrointestinal: Negative for abdominal distention, abdominal pain, anal bleeding, blood in stool, constipation, diarrhea, nausea, rectal pain, vomiting, GERD and indigestion.       The following portions of the patient's history were reviewed and updated as appropriate: allergies, current medications, past family history, past medical history, past social history, past surgical history and problem list.    Objective     Physical Exam:  Vital Signs:   Vitals:    03/24/21 1521   BP: 128/72   Pulse: 63   Temp: 98.4 °F (36.9 °C)   TempSrc: Temporal   SpO2: 98%   Weight: 87 kg (191 lb 12.8 oz)   Height: 177.8 cm (70\")       Physical Exam  Vitals and nursing note reviewed.   Constitutional:       Appearance: Normal appearance. He is well-developed.   HENT:      Head: Normocephalic and atraumatic.      Right Ear: External ear normal.      Left Ear: External ear normal.   Eyes:      Conjunctiva/sclera: Conjunctivae normal.   Neck:      Thyroid: No thyromegaly.      Trachea: No tracheal deviation.   Cardiovascular:      Rate and Rhythm: Normal rate and regular rhythm.      Heart sounds: No murmur heard.     Pulmonary:      Effort: Pulmonary effort is normal. No respiratory distress.      Breath sounds: Normal breath sounds.   Abdominal:      General: Bowel sounds are normal. There is no distension.      Palpations: Abdomen is soft. There is no mass.      Tenderness: There is no abdominal tenderness.      Hernia: No hernia is present.   Musculoskeletal:         General: Normal range of motion.      Cervical back: Normal range of motion and neck supple.   Skin:     General: Skin is warm and dry.   Neurological:      General: No focal deficit present.      Mental Status: He is alert and oriented to person, place, and time.      Cranial Nerves: No cranial nerve deficit.      Sensory: No sensory deficit.   Psychiatric:         Mood and Affect: Mood normal.         Behavior: Behavior " normal.         Thought Content: Thought content normal.         Judgment: Judgment normal.         Results Review:   I reviewed the patient's new clinical results.    Office Visit on 02/24/2021   Component Date Value Ref Range Status   • C. Difficile Toxins by PCR 02/25/2021 Not Detected  Not Detected Final   Admission on 02/20/2021, Discharged on 02/20/2021   Component Date Value Ref Range Status   • Glucose 02/20/2021 113* 65 - 99 mg/dL Final   • BUN 02/20/2021 40* 8 - 23 mg/dL Final   • Creatinine 02/20/2021 1.26  0.76 - 1.27 mg/dL Final   • Sodium 02/20/2021 140  136 - 145 mmol/L Final   • Potassium 02/20/2021 4.4  3.5 - 5.2 mmol/L Final   • Chloride 02/20/2021 106  98 - 107 mmol/L Final   • CO2 02/20/2021 25.7  22.0 - 29.0 mmol/L Final   • Calcium 02/20/2021 9.6  8.6 - 10.5 mg/dL Final   • Total Protein 02/20/2021 7.1  6.0 - 8.5 g/dL Final   • Albumin 02/20/2021 4.00  3.50 - 5.20 g/dL Final   • ALT (SGPT) 02/20/2021 14  1 - 41 U/L Final   • AST (SGOT) 02/20/2021 18  1 - 40 U/L Final   • Alkaline Phosphatase 02/20/2021 91  39 - 117 U/L Final   • Total Bilirubin 02/20/2021 0.5  0.0 - 1.2 mg/dL Final   • eGFR Non  Amer 02/20/2021 55* >60 mL/min/1.73 Final   • Globulin 02/20/2021 3.1  gm/dL Final   • A/G Ratio 02/20/2021 1.3  g/dL Final   • BUN/Creatinine Ratio 02/20/2021 31.7* 7.0 - 25.0 Final   • Anion Gap 02/20/2021 8.3  5.0 - 15.0 mmol/L Final   • Lipase 02/20/2021 25  13 - 60 U/L Final   • Color, UA 02/20/2021 Yellow  Yellow, Straw Final   • Appearance, UA 02/20/2021 Clear  Clear Final   • pH, UA 02/20/2021 <=5.0  5.0 - 8.0 Final   • Specific Gravity, UA 02/20/2021 >=1.030  1.005 - 1.030 Final   • Glucose, UA 02/20/2021 Negative  Negative Final   • Ketones, UA 02/20/2021 Negative  Negative Final   • Bilirubin, UA 02/20/2021 Negative  Negative Final   • Blood, UA 02/20/2021 Trace* Negative Final   • Protein, UA 02/20/2021 Negative  Negative Final   • Leuk Esterase, UA 02/20/2021 Negative  Negative Final    • Nitrite, UA 02/20/2021 Negative  Negative Final   • Urobilinogen, UA 02/20/2021 0.2 E.U./dL  0.2 - 1.0 E.U./dL Final   • Extra Tube 02/20/2021 hold for add-on   Final    Auto resulted   • Extra Tube 02/20/2021 Hold for add-ons.   Final    Auto resulted.   • Extra Tube 02/20/2021 hold for add-on   Final    Auto resulted   • Extra Tube 02/20/2021 Hold for add-ons.   Final    Auto resulted.   • WBC 02/20/2021 10.69  3.40 - 10.80 10*3/mm3 Final   • RBC 02/20/2021 4.28  4.14 - 5.80 10*6/mm3 Final   • Hemoglobin 02/20/2021 13.9  13.0 - 17.7 g/dL Final   • Hematocrit 02/20/2021 42.2  37.5 - 51.0 % Final   • MCV 02/20/2021 98.6* 79.0 - 97.0 fL Final   • MCH 02/20/2021 32.5  26.6 - 33.0 pg Final   • MCHC 02/20/2021 32.9  31.5 - 35.7 g/dL Final   • RDW 02/20/2021 15.2  12.3 - 15.4 % Final   • RDW-SD 02/20/2021 54.2* 37.0 - 54.0 fl Final   • MPV 02/20/2021 10.2  6.0 - 12.0 fL Final   • Platelets 02/20/2021 309  140 - 450 10*3/mm3 Final   • Neutrophil % 02/20/2021 80.3* 42.7 - 76.0 % Final   • Lymphocyte % 02/20/2021 10.9* 19.6 - 45.3 % Final   • Monocyte % 02/20/2021 5.7  5.0 - 12.0 % Final   • Eosinophil % 02/20/2021 2.0  0.3 - 6.2 % Final   • Basophil % 02/20/2021 0.6  0.0 - 1.5 % Final   • Immature Grans % 02/20/2021 0.5  0.0 - 0.5 % Final   • Neutrophils, Absolute 02/20/2021 8.59* 1.70 - 7.00 10*3/mm3 Final   • Lymphocytes, Absolute 02/20/2021 1.17  0.70 - 3.10 10*3/mm3 Final   • Monocytes, Absolute 02/20/2021 0.61  0.10 - 0.90 10*3/mm3 Final   • Eosinophils, Absolute 02/20/2021 0.21  0.00 - 0.40 10*3/mm3 Final   • Basophils, Absolute 02/20/2021 0.06  0.00 - 0.20 10*3/mm3 Final   • Immature Grans, Absolute 02/20/2021 0.05  0.00 - 0.05 10*3/mm3 Final   • nRBC 02/20/2021 0.0  0.0 - 0.2 /100 WBC Final   • RBC, UA 02/20/2021 6-12* None Seen /HPF Final   • WBC, UA 02/20/2021 0-2* None Seen /HPF Final   • Bacteria, UA 02/20/2021 Trace* None Seen /HPF Final   • Squamous Epithelial Cells, UA 02/20/2021 0-2  None Seen, 0-2 /HPF  Final   • Hyaline Casts, UA 02/20/2021 0-2  None Seen /LPF Final   • Methodology 02/20/2021 Manual Light Microscopy   Final   Admission on 12/26/2020, Discharged on 12/28/2020   Component Date Value Ref Range Status   • Glucose 12/26/2020 113* 65 - 99 mg/dL Final   • BUN 12/26/2020 26* 8 - 23 mg/dL Final   • Creatinine 12/26/2020 1.09  0.76 - 1.27 mg/dL Final   • Sodium 12/26/2020 136  136 - 145 mmol/L Final   • Potassium 12/26/2020 4.7  3.5 - 5.2 mmol/L Final    Slight hemolysis detected by analyzer. Results may be affected.   • Chloride 12/26/2020 105  98 - 107 mmol/L Final   • CO2 12/26/2020 21.4* 22.0 - 29.0 mmol/L Final   • Calcium 12/26/2020 8.8  8.6 - 10.5 mg/dL Final   • Total Protein 12/26/2020 6.7  6.0 - 8.5 g/dL Final   • Albumin 12/26/2020 3.60  3.50 - 5.20 g/dL Final   • ALT (SGPT) 12/26/2020 12  1 - 41 U/L Final   • AST (SGOT) 12/26/2020 15  1 - 40 U/L Final   • Alkaline Phosphatase 12/26/2020 86  39 - 117 U/L Final   • Total Bilirubin 12/26/2020 0.8  0.0 - 1.2 mg/dL Final   • eGFR Non African Amer 12/26/2020 65  >60 mL/min/1.73 Final   • Globulin 12/26/2020 3.1  gm/dL Final   • A/G Ratio 12/26/2020 1.2  g/dL Final   • BUN/Creatinine Ratio 12/26/2020 23.9  7.0 - 25.0 Final   • Anion Gap 12/26/2020 9.6  5.0 - 15.0 mmol/L Final   • Lipase 12/26/2020 70* 13 - 60 U/L Final   • Lactate 12/26/2020 1.6  0.5 - 2.0 mmol/L Final   • WBC 12/26/2020 18.45* 3.40 - 10.80 10*3/mm3 Final   • RBC 12/26/2020 4.20  4.14 - 5.80 10*6/mm3 Final   • Hemoglobin 12/26/2020 13.6  13.0 - 17.7 g/dL Final   • Hematocrit 12/26/2020 42.0  37.5 - 51.0 % Final   • MCV 12/26/2020 100.0* 79.0 - 97.0 fL Final   • MCH 12/26/2020 32.4  26.6 - 33.0 pg Final   • MCHC 12/26/2020 32.4  31.5 - 35.7 g/dL Final   • RDW 12/26/2020 14.4  12.3 - 15.4 % Final   • RDW-SD 12/26/2020 52.0  37.0 - 54.0 fl Final   • MPV 12/26/2020 11.1  6.0 - 12.0 fL Final   • Platelets 12/26/2020 278  140 - 450 10*3/mm3 Final   • Neutrophil % 12/26/2020 84.7* 42.7 - 76.0  % Final   • Lymphocyte % 12/26/2020 6.2* 19.6 - 45.3 % Final   • Monocyte % 12/26/2020 6.9  5.0 - 12.0 % Final   • Eosinophil % 12/26/2020 0.9  0.3 - 6.2 % Final   • Basophil % 12/26/2020 0.4  0.0 - 1.5 % Final   • Immature Grans % 12/26/2020 0.9* 0.0 - 0.5 % Final   • Neutrophils, Absolute 12/26/2020 15.64* 1.70 - 7.00 10*3/mm3 Final   • Lymphocytes, Absolute 12/26/2020 1.14  0.70 - 3.10 10*3/mm3 Final   • Monocytes, Absolute 12/26/2020 1.27* 0.10 - 0.90 10*3/mm3 Final   • Eosinophils, Absolute 12/26/2020 0.16  0.00 - 0.40 10*3/mm3 Final   • Basophils, Absolute 12/26/2020 0.07  0.00 - 0.20 10*3/mm3 Final   • Immature Grans, Absolute 12/26/2020 0.17* 0.00 - 0.05 10*3/mm3 Final   • nRBC 12/26/2020 0.0  0.0 - 0.2 /100 WBC Final   • RBC Morphology 12/26/2020 Normal  Normal Final   • WBC Morphology 12/26/2020 Normal  Normal Final   • Platelet Estimate 12/26/2020 Adequate  Normal Final    No microclots   • Large Platelets 12/26/2020 Slight/1+  None Seen Final   • Campylobacter 12/27/2020 Not Detected  Not Detected Final   • Plesiomonas shigelloides 12/27/2020 Not Detected  Not Detected Final   • Salmonella 12/27/2020 Not Detected  Not Detected Final   • Vibrio 12/27/2020 Not Detected  Not Detected Final   • Vibrio cholerae 12/27/2020 Not Detected  Not Detected Final   • Yersinia enterocolitica 12/27/2020 Not Detected  Not Detected Final   • Enteroaggregative E. coli (EAEC) 12/27/2020 Not Detected  Not Detected Final   • Enteropathogenic E. coli (EPEC) 12/27/2020 Not Detected  Not Detected Final   • Enterotoxigenic E. coli (ETEC) lt/* 12/27/2020 Not Detected  Not Detected Final   • Shiga-like toxin-producing E. coli* 12/27/2020 Not Detected  Not Detected Final   • Shigella/Enteroinvasive E. coli (E* 12/27/2020 Not Detected  Not Detected Final   • Cryptosporidium 12/27/2020 Not Detected  Not Detected Final   • Cyclospora cayetanensis 12/27/2020 Not Detected  Not Detected Final   • Entamoeba histolytica 12/27/2020 Not  Detected  Not Detected Final   • Giardia lamblia 12/27/2020 Not Detected  Not Detected Final   • Adenovirus F40/41 12/27/2020 Not Detected  Not Detected Final   • Astrovirus 12/27/2020 Not Detected  Not Detected Final   • Norovirus GI/GII 12/27/2020 Not Detected  Not Detected Final   • Rotavirus A 12/27/2020 Not Detected  Not Detected Final   • Sapovirus (I, II, IV or V) 12/27/2020 Not Detected  Not Detected Final   • COVID19 12/26/2020 Not Detected  Not Detected - Ref. Range Final   • WBC 12/27/2020 11.03* 3.40 - 10.80 10*3/mm3 Final   • RBC 12/27/2020 3.98* 4.14 - 5.80 10*6/mm3 Final   • Hemoglobin 12/27/2020 12.8* 13.0 - 17.7 g/dL Final   • Hematocrit 12/27/2020 38.9  37.5 - 51.0 % Final   • MCV 12/27/2020 97.7* 79.0 - 97.0 fL Final   • MCH 12/27/2020 32.2  26.6 - 33.0 pg Final   • MCHC 12/27/2020 32.9  31.5 - 35.7 g/dL Final   • RDW 12/27/2020 14.4  12.3 - 15.4 % Final   • RDW-SD 12/27/2020 51.4  37.0 - 54.0 fl Final   • MPV 12/27/2020 10.8  6.0 - 12.0 fL Final   • Platelets 12/27/2020 296  140 - 450 10*3/mm3 Final   • Neutrophil % 12/27/2020 76.5* 42.7 - 76.0 % Final   • Lymphocyte % 12/27/2020 13.1* 19.6 - 45.3 % Final   • Monocyte % 12/27/2020 6.9  5.0 - 12.0 % Final   • Eosinophil % 12/27/2020 2.5  0.3 - 6.2 % Final   • Basophil % 12/27/2020 0.5  0.0 - 1.5 % Final   • Immature Grans % 12/27/2020 0.5  0.0 - 0.5 % Final   • Neutrophils, Absolute 12/27/2020 8.42* 1.70 - 7.00 10*3/mm3 Final   • Lymphocytes, Absolute 12/27/2020 1.45  0.70 - 3.10 10*3/mm3 Final   • Monocytes, Absolute 12/27/2020 0.76  0.10 - 0.90 10*3/mm3 Final   • Eosinophils, Absolute 12/27/2020 0.28  0.00 - 0.40 10*3/mm3 Final   • Basophils, Absolute 12/27/2020 0.06  0.00 - 0.20 10*3/mm3 Final   • Immature Grans, Absolute 12/27/2020 0.06* 0.00 - 0.05 10*3/mm3 Final   • nRBC 12/27/2020 0.0  0.0 - 0.2 /100 WBC Final   • Glucose 12/27/2020 87  65 - 99 mg/dL Final   • BUN 12/27/2020 24* 8 - 23 mg/dL Final   • Creatinine 12/27/2020 0.95  0.76 - 1.27  mg/dL Final   • Sodium 12/27/2020 137  136 - 145 mmol/L Final   • Potassium 12/27/2020 4.2  3.5 - 5.2 mmol/L Final   • Chloride 12/27/2020 105  98 - 107 mmol/L Final   • CO2 12/27/2020 21.3* 22.0 - 29.0 mmol/L Final   • Calcium 12/27/2020 8.6  8.6 - 10.5 mg/dL Final   • Total Protein 12/27/2020 6.3  6.0 - 8.5 g/dL Final   • Albumin 12/27/2020 3.50  3.50 - 5.20 g/dL Final   • ALT (SGPT) 12/27/2020 10  1 - 41 U/L Final   • AST (SGOT) 12/27/2020 13  1 - 40 U/L Final   • Alkaline Phosphatase 12/27/2020 82  39 - 117 U/L Final   • Total Bilirubin 12/27/2020 0.8  0.0 - 1.2 mg/dL Final   • eGFR Non African Amer 12/27/2020 76  >60 mL/min/1.73 Final   • Globulin 12/27/2020 2.8  gm/dL Final   • A/G Ratio 12/27/2020 1.3  g/dL Final   • BUN/Creatinine Ratio 12/27/2020 25.3* 7.0 - 25.0 Final   • Anion Gap 12/27/2020 10.7  5.0 - 15.0 mmol/L Final   • Lipase 12/27/2020 19  13 - 60 U/L Final   • Troponin T 12/27/2020 <0.010  0.000 - 0.030 ng/mL Final   • Glucose 12/28/2020 94  65 - 99 mg/dL Final   • BUN 12/28/2020 19  8 - 23 mg/dL Final   • Creatinine 12/28/2020 1.00  0.76 - 1.27 mg/dL Final   • Sodium 12/28/2020 137  136 - 145 mmol/L Final   • Potassium 12/28/2020 4.2  3.5 - 5.2 mmol/L Final   • Chloride 12/28/2020 106  98 - 107 mmol/L Final   • CO2 12/28/2020 20.0* 22.0 - 29.0 mmol/L Final   • Calcium 12/28/2020 8.4* 8.6 - 10.5 mg/dL Final   • Total Protein 12/28/2020 6.3  6.0 - 8.5 g/dL Final   • Albumin 12/28/2020 3.30* 3.50 - 5.20 g/dL Final   • ALT (SGPT) 12/28/2020 9  1 - 41 U/L Final   • AST (SGOT) 12/28/2020 12  1 - 40 U/L Final   • Alkaline Phosphatase 12/28/2020 82  39 - 117 U/L Final   • Total Bilirubin 12/28/2020 0.6  0.0 - 1.2 mg/dL Final   • eGFR Non African Amer 12/28/2020 72  >60 mL/min/1.73 Final   • Globulin 12/28/2020 3.0  gm/dL Final   • A/G Ratio 12/28/2020 1.1  g/dL Final   • BUN/Creatinine Ratio 12/28/2020 19.0  7.0 - 25.0 Final   • Anion Gap 12/28/2020 11.0  5.0 - 15.0 mmol/L Final   • Lipase 12/28/2020 17   13 - 60 U/L Final   • Troponin T 12/28/2020 <0.010  0.000 - 0.030 ng/mL Final   • WBC 12/28/2020 11.90* 3.40 - 10.80 10*3/mm3 Final   • RBC 12/28/2020 3.97* 4.14 - 5.80 10*6/mm3 Final   • Hemoglobin 12/28/2020 12.8* 13.0 - 17.7 g/dL Final   • Hematocrit 12/28/2020 38.1  37.5 - 51.0 % Final   • MCV 12/28/2020 96.0  79.0 - 97.0 fL Final   • MCH 12/28/2020 32.2  26.6 - 33.0 pg Final   • MCHC 12/28/2020 33.6  31.5 - 35.7 g/dL Final   • RDW 12/28/2020 14.0  12.3 - 15.4 % Final   • RDW-SD 12/28/2020 48.6  37.0 - 54.0 fl Final   • MPV 12/28/2020 10.8  6.0 - 12.0 fL Final   • Platelets 12/28/2020 282  140 - 450 10*3/mm3 Final   • Lactate 12/28/2020 0.8  0.5 - 2.0 mmol/L Final   Admission on 12/25/2020, Discharged on 12/26/2020   Component Date Value Ref Range Status   • Glucose 12/25/2020 102* 65 - 99 mg/dL Final   • BUN 12/25/2020 28* 8 - 23 mg/dL Final   • Creatinine 12/25/2020 1.12  0.76 - 1.27 mg/dL Final   • Sodium 12/25/2020 138  136 - 145 mmol/L Final   • Potassium 12/25/2020 4.6  3.5 - 5.2 mmol/L Final   • Chloride 12/25/2020 102  98 - 107 mmol/L Final   • CO2 12/25/2020 25.1  22.0 - 29.0 mmol/L Final   • Calcium 12/25/2020 9.3  8.6 - 10.5 mg/dL Final   • Total Protein 12/25/2020 7.2  6.0 - 8.5 g/dL Final   • Albumin 12/25/2020 4.20  3.50 - 5.20 g/dL Final   • ALT (SGPT) 12/25/2020 12  1 - 41 U/L Final   • AST (SGOT) 12/25/2020 15  1 - 40 U/L Final   • Alkaline Phosphatase 12/25/2020 91  39 - 117 U/L Final   • Total Bilirubin 12/25/2020 0.7  0.0 - 1.2 mg/dL Final   • eGFR Non African Amer 12/25/2020 63  >60 mL/min/1.73 Final   • Globulin 12/25/2020 3.0  gm/dL Final   • A/G Ratio 12/25/2020 1.4  g/dL Final   • BUN/Creatinine Ratio 12/25/2020 25.0  7.0 - 25.0 Final   • Anion Gap 12/25/2020 10.9  5.0 - 15.0 mmol/L Final   • Lipase 12/25/2020 49  13 - 60 U/L Final   • WBC 12/25/2020 16.11* 3.40 - 10.80 10*3/mm3 Final   • RBC 12/25/2020 4.32  4.14 - 5.80 10*6/mm3 Final   • Hemoglobin 12/25/2020 14.1  13.0 - 17.7 g/dL  Final   • Hematocrit 12/25/2020 42.5  37.5 - 51.0 % Final   • MCV 12/25/2020 98.4* 79.0 - 97.0 fL Final   • MCH 12/25/2020 32.6  26.6 - 33.0 pg Final   • MCHC 12/25/2020 33.2  31.5 - 35.7 g/dL Final   • RDW 12/25/2020 14.2  12.3 - 15.4 % Final   • RDW-SD 12/25/2020 51.5  37.0 - 54.0 fl Final   • MPV 12/25/2020 10.6  6.0 - 12.0 fL Final   • Platelets 12/25/2020 332  140 - 450 10*3/mm3 Final   • Neutrophil % 12/25/2020 78.5* 42.7 - 76.0 % Final   • Lymphocyte % 12/25/2020 10.2* 19.6 - 45.3 % Final   • Monocyte % 12/25/2020 8.6  5.0 - 12.0 % Final   • Eosinophil % 12/25/2020 1.7  0.3 - 6.2 % Final   • Basophil % 12/25/2020 0.4  0.0 - 1.5 % Final   • Immature Grans % 12/25/2020 0.6* 0.0 - 0.5 % Final   • Neutrophils, Absolute 12/25/2020 12.67* 1.70 - 7.00 10*3/mm3 Final   • Lymphocytes, Absolute 12/25/2020 1.64  0.70 - 3.10 10*3/mm3 Final   • Monocytes, Absolute 12/25/2020 1.38* 0.10 - 0.90 10*3/mm3 Final   • Eosinophils, Absolute 12/25/2020 0.27  0.00 - 0.40 10*3/mm3 Final   • Basophils, Absolute 12/25/2020 0.06  0.00 - 0.20 10*3/mm3 Final   • Immature Grans, Absolute 12/25/2020 0.09* 0.00 - 0.05 10*3/mm3 Final   • nRBC 12/25/2020 0.0  0.0 - 0.2 /100 WBC Final      CT Abdomen Pelvis Without Contrast    Result Date: 12/25/2020  No acute disease. Authenticated by Nicholas Samuels M.D. on 12/25/2020 11:49:09 PM    CT Abdomen Pelvis With Contrast    Result Date: 2/20/2021  IMPRESSION: Air-fluid levels within small bowel, without significant distention.  Possible enteritis.  No high-grade obstruction. Authenticated by Tommie Sierra MD on 02/20/2021 06:25:07 AM      Assessment / Plan      1. Diarrhea, unspecified type  2.  Gastro enteritis  3/24/2021  His diarrhea resolved now having regular bowel movement.  His stool C. difficile test is negative.  Stool PCR panel was negative.   He likely had a viral diarrhea that has resolved now.   We will monitor for now    2/24/2021  This is a second episode of diarrhea he had since  December 2020.  He was admitted in December with acute diarrhea and is GI panel was negative for any common bacterial infections.  I did not see any C. difficile colitis test done at that time.  He comes back this time again with acute diarrhea and lower abdominal pain without any fever chills.  He was been started on Levaquin Flagyl in the emergency room.  His symptoms have improved now he is alf through his treatment.  He still has some loose stool about 3-4 times daily without any blood or melena.  His lab work done in the emergency room was unremarkable.  CT scan abdomen pelvis done revealed fluid-filled small bowel loops suggesting possible enteritis.     Clinically this appears to be still an infectious origin.  Rare possibility of microscopic colitis and other pathology need to be ruled out if his diarrhea persists  We will get a stool for C. difficile colitis  Will consider colonoscopy if his symptoms persist after 4 weeks.  We will complete his Levaquin Flagyl course now     - Clostridium Difficile Toxin, PCR - Stool, Per Rectum; Future     3. Weight loss, abnormal  3/24/2021  He gained about 4 pounds now since the last visit.  CT abdomen pelvis done recently did not reveal any solid organ pathology.   Patient declined colonoscopy.  He understands that colonic lesions can be missed without the colonoscopy.   We will monitor for now    2/24/2021  He lost about 10 pounds recently.  This is appears to be recent from his recent hospital admission and multiple diarrheas.  His CT scan abdomen pelvis done did not reveal any significant intra-abdominal pathology.   Will monitor for now     4.  Colon cancer screening  His last colonoscopy was more than 15 years ago and was been told normal as per patient.  He does have some weight loss and change in bowel habit recently.  I have discussed with the patient that colonic lesion can be missed with a CT scan and he would benefit with a colonoscopy at this time.    However patient declined colonoscopy and he is well aware that colonic lesion could be missed without the colonoscopy.   He is agreeable for the Cologuard test we will order the same          Follow Up:   No follow-ups on file.    Gaurang Dillon MD  [facility]  3/24/2021  15:25 EDT     Please note that portions of this note may have been completed with a voice recognition program.

## 2021-06-14 ENCOUNTER — PREP FOR SURGERY (OUTPATIENT)
Dept: OTHER | Facility: HOSPITAL | Age: 81
End: 2021-06-14

## 2021-06-14 DIAGNOSIS — H25.11 NUCLEAR SCLEROTIC CATARACT OF RIGHT EYE: Primary | ICD-10-CM

## 2021-06-14 RX ORDER — TETRACAINE HYDROCHLORIDE 5 MG/ML
1 SOLUTION OPHTHALMIC SEE ADMIN INSTRUCTIONS
Status: CANCELLED | OUTPATIENT
Start: 2021-06-14

## 2021-06-14 RX ORDER — PREDNISOLONE ACETATE 10 MG/ML
1 SUSPENSION/ DROPS OPHTHALMIC SEE ADMIN INSTRUCTIONS
Status: CANCELLED | OUTPATIENT
Start: 2021-06-14

## 2021-06-14 RX ORDER — SODIUM CHLORIDE 0.9 % (FLUSH) 0.9 %
3 SYRINGE (ML) INJECTION EVERY 12 HOURS SCHEDULED
Status: CANCELLED | OUTPATIENT
Start: 2021-06-14

## 2021-06-14 RX ORDER — SODIUM CHLORIDE 0.9 % (FLUSH) 0.9 %
1-10 SYRINGE (ML) INJECTION AS NEEDED
Status: CANCELLED | OUTPATIENT
Start: 2021-06-14

## 2021-06-14 RX ORDER — CYCLOPENT/TROPIC/PHEN/KETR/WAT 1%-1%-2.5%
1 DROPS (EA) OPHTHALMIC (EYE)
Status: CANCELLED | OUTPATIENT
Start: 2021-06-14 | End: 2021-06-14

## 2021-06-21 ENCOUNTER — ANESTHESIA (OUTPATIENT)
Dept: PERIOP | Facility: HOSPITAL | Age: 81
End: 2021-06-21

## 2021-06-21 ENCOUNTER — ANESTHESIA EVENT (OUTPATIENT)
Dept: PERIOP | Facility: HOSPITAL | Age: 81
End: 2021-06-21

## 2021-06-21 ENCOUNTER — HOSPITAL ENCOUNTER (OUTPATIENT)
Facility: HOSPITAL | Age: 81
Setting detail: HOSPITAL OUTPATIENT SURGERY
Discharge: HOME | End: 2021-06-21
Attending: OPHTHALMOLOGY | Admitting: OPHTHALMOLOGY

## 2021-06-21 VITALS
HEIGHT: 70 IN | SYSTOLIC BLOOD PRESSURE: 130 MMHG | OXYGEN SATURATION: 98 % | BODY MASS INDEX: 26.63 KG/M2 | RESPIRATION RATE: 16 BRPM | TEMPERATURE: 97.7 F | WEIGHT: 186 LBS | HEART RATE: 53 BPM | DIASTOLIC BLOOD PRESSURE: 66 MMHG

## 2021-06-21 DIAGNOSIS — H25.11 NUCLEAR SCLEROTIC CATARACT OF RIGHT EYE: ICD-10-CM

## 2021-06-21 PROCEDURE — V2632 POST CHMBR INTRAOCULAR LENS: HCPCS | Performed by: OPHTHALMOLOGY

## 2021-06-21 PROCEDURE — 25010000002 PROPOFOL 200 MG/20ML EMULSION: Performed by: NURSE ANESTHETIST, CERTIFIED REGISTERED

## 2021-06-21 DEVICE — LENS ACRYSOF IQ SA60WF W/ULTRASERT 6X13MM ACU0T0 20.5: Type: IMPLANTABLE DEVICE | Site: POSTERIOR CHAMBER | Status: FUNCTIONAL

## 2021-06-21 RX ORDER — SODIUM CHLORIDE 0.9 % (FLUSH) 0.9 %
3 SYRINGE (ML) INJECTION EVERY 12 HOURS SCHEDULED
Status: DISCONTINUED | OUTPATIENT
Start: 2021-06-21 | End: 2021-06-21 | Stop reason: HOSPADM

## 2021-06-21 RX ORDER — PREDNISOLONE ACETATE 10 MG/ML
SUSPENSION/ DROPS OPHTHALMIC AS NEEDED
Status: DISCONTINUED | OUTPATIENT
Start: 2021-06-21 | End: 2021-06-21 | Stop reason: HOSPADM

## 2021-06-21 RX ORDER — PREDNISOLONE ACETATE 10 MG/ML
1 SUSPENSION/ DROPS OPHTHALMIC SEE ADMIN INSTRUCTIONS
Status: DISCONTINUED | OUTPATIENT
Start: 2021-06-21 | End: 2021-06-21 | Stop reason: HOSPADM

## 2021-06-21 RX ORDER — TETRACAINE HYDROCHLORIDE 5 MG/ML
SOLUTION OPHTHALMIC AS NEEDED
Status: DISCONTINUED | OUTPATIENT
Start: 2021-06-21 | End: 2021-06-21 | Stop reason: HOSPADM

## 2021-06-21 RX ORDER — SODIUM CHLORIDE 0.9 % (FLUSH) 0.9 %
1-10 SYRINGE (ML) INJECTION AS NEEDED
Status: DISCONTINUED | OUTPATIENT
Start: 2021-06-21 | End: 2021-06-21 | Stop reason: HOSPADM

## 2021-06-21 RX ORDER — CYCLOPENT/TROPIC/PHEN/KETR/WAT 1%-1%-2.5%
1 DROPS (EA) OPHTHALMIC (EYE)
Status: COMPLETED | OUTPATIENT
Start: 2021-06-21 | End: 2021-06-21

## 2021-06-21 RX ORDER — SODIUM CHLORIDE, SODIUM LACTATE, POTASSIUM CHLORIDE, CALCIUM CHLORIDE 600; 310; 30; 20 MG/100ML; MG/100ML; MG/100ML; MG/100ML
25 INJECTION, SOLUTION INTRAVENOUS CONTINUOUS
Status: DISCONTINUED | OUTPATIENT
Start: 2021-06-21 | End: 2021-06-21 | Stop reason: HOSPADM

## 2021-06-21 RX ORDER — NEOMYCIN SULFATE, POLYMYXIN B SULFATE, AND DEXAMETHASONE 3.5; 10000; 1 MG/G; [USP'U]/G; MG/G
OINTMENT OPHTHALMIC AS NEEDED
Status: DISCONTINUED | OUTPATIENT
Start: 2021-06-21 | End: 2021-06-21 | Stop reason: HOSPADM

## 2021-06-21 RX ORDER — PROPOFOL 10 MG/ML
INJECTION, EMULSION INTRAVENOUS AS NEEDED
Status: DISCONTINUED | OUTPATIENT
Start: 2021-06-21 | End: 2021-06-21 | Stop reason: SURG

## 2021-06-21 RX ORDER — KETAMINE HCL IN NACL, ISO-OSM 100MG/10ML
SYRINGE (ML) INJECTION AS NEEDED
Status: DISCONTINUED | OUTPATIENT
Start: 2021-06-21 | End: 2021-06-21 | Stop reason: SURG

## 2021-06-21 RX ORDER — LIDOCAINE HYDROCHLORIDE 40 MG/ML
INJECTION, SOLUTION RETROBULBAR; TOPICAL AS NEEDED
Status: DISCONTINUED | OUTPATIENT
Start: 2021-06-21 | End: 2021-06-21 | Stop reason: HOSPADM

## 2021-06-21 RX ORDER — PREDNISOLONE ACETATE 10 MG/ML
SUSPENSION/ DROPS OPHTHALMIC
Qty: 2 ML | Refills: 0
Start: 2021-06-21 | End: 2021-07-30

## 2021-06-21 RX ORDER — BALANCED SALT SOLUTION 6.4; .75; .48; .3; 3.9; 1.7 MG/ML; MG/ML; MG/ML; MG/ML; MG/ML; MG/ML
SOLUTION OPHTHALMIC AS NEEDED
Status: DISCONTINUED | OUTPATIENT
Start: 2021-06-21 | End: 2021-06-21 | Stop reason: HOSPADM

## 2021-06-21 RX ORDER — ACETAZOLAMIDE 500 MG/1
500 CAPSULE, EXTENDED RELEASE ORAL ONCE
Status: COMPLETED | OUTPATIENT
Start: 2021-06-21 | End: 2021-06-21

## 2021-06-21 RX ORDER — TETRACAINE HYDROCHLORIDE 5 MG/ML
1 SOLUTION OPHTHALMIC SEE ADMIN INSTRUCTIONS
Status: COMPLETED | OUTPATIENT
Start: 2021-06-21 | End: 2021-06-21

## 2021-06-21 RX ADMIN — PROPOFOL 50 MG: 10 INJECTION, EMULSION INTRAVENOUS at 12:35

## 2021-06-21 RX ADMIN — ACETAZOLAMIDE 500 MG: 500 CAPSULE, EXTENDED RELEASE ORAL at 12:58

## 2021-06-21 RX ADMIN — Medication 1 DROP: at 10:55

## 2021-06-21 RX ADMIN — SODIUM CHLORIDE, POTASSIUM CHLORIDE, SODIUM LACTATE AND CALCIUM CHLORIDE: 600; 310; 30; 20 INJECTION, SOLUTION INTRAVENOUS at 12:22

## 2021-06-21 RX ADMIN — Medication 1 DROP: at 10:45

## 2021-06-21 RX ADMIN — TETRACAINE HYDROCHLORIDE 1 DROP: 5 SOLUTION OPHTHALMIC at 10:44

## 2021-06-21 RX ADMIN — PROPOFOL 100 MG: 10 INJECTION, EMULSION INTRAVENOUS at 12:27

## 2021-06-21 RX ADMIN — TETRACAINE HYDROCHLORIDE 1 DROP: 5 SOLUTION OPHTHALMIC at 10:43

## 2021-06-21 RX ADMIN — Medication 20 MG: at 12:27

## 2021-06-21 RX ADMIN — Medication 1 DROP: at 10:50

## 2021-06-21 NOTE — ANESTHESIA POSTPROCEDURE EVALUATION
Patient: Ted Saldivar    Procedure Summary     Date: 06/21/21 Room / Location: [facility] OR 1 /  [facility] OR    Anesthesia Start: 1221 Anesthesia Stop: 1238    Procedure: CATARACT PHACO EXTRACTION WITH INTRAOCULAR LENS IMPLANT RIGHT (Right Eye) Diagnosis:       Nuclear sclerotic cataract of right eye      (Nuclear sclerotic cataract of right eye [H25.11])    Surgeons: Gio Mcmanus MD Provider: Darron Gracia CRNA    Anesthesia Type: MAC ASA Status: 2          Anesthesia Type: MAC    Vitals  Vitals Value Taken Time   /53 06/21/21 1242   Temp 97.7 °F (36.5 °C) 06/21/21 1242   Pulse 58 06/21/21 1242   Resp 16 06/21/21 1242   SpO2 98 % 06/21/21 1242           Post Anesthesia Care and Evaluation    Patient location during evaluation: bedside  Patient participation: complete - patient participated  Level of consciousness: awake and alert  Pain management: satisfactory to patient  Airway patency: patent  Anesthetic complications: No anesthetic complications    Cardiovascular status: acceptable and hemodynamically stable  Respiratory status: acceptable  Hydration status: acceptable  No anesthesia care post op

## 2021-06-21 NOTE — DISCHARGE INSTRUCTIONS
Post Operative Cataract Instructions     Right Eye  POST OPERATIVE INSTRUCTIONS  • You have received anesthesia today. DO NOT drive, drink alcohol, sign legal documents.   • After surgery, your eye will not hurt. It may feel scratchy, sticky or uncomfortable. Your eye will be sensitive to light.  • Most people see better 1-3 days after the procedure, but it could take 3 weeks to get the full benefits and reach your visual potential. If your vision is blurry for a few days it is normal, and means you may have swelling outside or inside the eye. For some patients, a bubble is placed and there will be blurriness.   • You should receive a post-op kit with tape and an eye shield. Wear the shield for the first 3 nights after surgery to keep you from rubbing the eye.  • You may wear glasses or sunglasses during the day.  You must keep eye protected at all times.  • Most people are able to return to their normal routine 1-3 days after surgery, however, due to the brain adjusting to your new vision you may have trouble judging distances and want to be careful when driving and going up and downstairs.   • You can shower and wash your hair the day after surgery. Keep water, shampoo, hair spray and shaving lotion out of the eye, especially for the first week.   • If eyes become stuck together after surgery you may soak with warm cloth.  • During the first week, you should AVOID:   1. Rubbing or putting pressure on your eye.  2. Eye make-up, face cream or lotion, hair coloring or perms  3. Strenuous activities, such as running or lifting weights, as to avoid sweat from getting in the eye. Avoid swimming, hot tubs, fumes or moi conditions.   4. Sleeping on operative side.  5. Excessive sneezing or coughing.  6. Hanging the head down for periods of time. Keep your head above your heart.    Some discomfort and blurred vision after surgery are normal, but if you have any unusual pain, swelling, bleeding or sudden decrease in  vision, contact our office immediately.     Emergency assistance is available at any time by calling:    Dr.Mark Yonathan Mcmanus  [phone]    If unable to reach call [facility] @ [phone]    You have been prescribed an eye drop to use after surgery, please follow these instructions and bring eye drops and instructions with you to post op appointment:    Prednisolone Acetate: Shake well before use    USE 1 DROP 4 (FOUR) TIMES A DAY FOR 1 WEEK THEN STARTING WEEK 2, ONLY USE 2 (TWO) TIMES A DAY UNTIL YOU RUN OUT OF DROPS.     PLACE A BENJAMIN IN THE DAY COLUMN EACH TIME YOU USE TO KEEP ON SCHEDULE    WEEK 1-USE 4  (FOUR) TIMES A DAY DAY 1  []   []    []   []     DAY 2  []   []    []   []  DAY 3  []   []    []   []  DAY 4  []   []    []   []  DAY 5  []   []    []   []  DAY 6  []   []    []   []  DAY 7  []   []    []   []      WEEK 2-USE 2 (TWO)  TIMES A DAY DAY 1  []   []  DAY 2  []   []  DAY 3  []   []  DAY 4  []   []  DAY 5  []   []  DAY 6  []   []  DAY 7  []   []      WEEK 3-USE 2 (TWO) TIMES A DAY DAY 1  []   []  DAY 2  []   []  DAY 3  []   []  DAY 4  []   []  DAY 5  []   []  DAY 6  []   []  DAY 7  []   []      WEEK 4-USE 2 (TWO)TIMES A DAY DAY 1  []   []  DAY 2  []   []  DAY 3  []   []  DAY 4  []   []  DAY 5  []   []  DAY 6  []   []  DAY 7  []   []      Please follow all post op instructions and follow up appointment time from your physician's office included in your discharge packet.  .   No pushing, pulling, tugging,  heavy lifting, or strenuous activity.  No major decision making, driving, or drinking alcoholic beverages for 24 hours. ( due to the medications you have  received)  Always use good hand hygiene/washing techniques.  NO driving while taking pain medications.    * if you have an incision:  Check your incision area every day for signs of infection.   Check for:  * more redness, swelling, or pain  *more fluid or  blood  *warmth  *pus or bad smell  To assist you in voiding:  Drink plenty of fluids  Listen to running water while attempting to void.    If you are unable to urinate and you have an uncomfortable urge to void or it has been   6 hours since you were discharged, return to the Emergency Room

## 2021-06-21 NOTE — OP NOTE
OPERATIVE NOTE    Date of Procedure: 6/21/2021  Patient Name: Ted Saldivar  Patient MRN: [id number]  YOB: 1940     Preoperative Diagnosis: Right nuclear sclerotic cataract.     Postoperative Diagnosis: Right nuclear sclerotic cataract.     Procedure Performed: Phacoemulsification with implantation of a  foldable posterior chamber intraocular lens, Right eye.     Surgeon: Gio Mcmanus MD     Anesthesia:  Monitored Anesthesia Care (MAC)      Brief History and Indication: The patient presents with a history of past progressive loss of vision.  Patient was diagnosed with cataract and requests removal for increased ability to read and see.     Operation Description: The patient was taken to the OR and prepped and draped in the usual sterile ophthalmic fashion. A lid speculum was placed in the eye.  A #75 blade was then used to make a stab incision two o’clock hours from the intended temporal clear cornea groove. The anterior chamber was then inflated with a Viscoelastic. A metal microkeratome blade was then used to enter the anterior chamber at the temporal clear cornea site. A three level tunnel incision was made. A curvilinear capsulorrhexis was then performed with a bent cystotome needle and capsulorrhexis forceps.  BSS on a 30 gauge bent cannula was used to hydro-dissect, and hydro-delineate the lens. Good fluid waves and hydro-delineation were noted. Phacoemulsification was then used to remove nuclear material without complications. The residual cortical and lenticular material was then removed with irrigation and aspiration. Viscoelastics were then used to inflate the bag in a soft shell technique. A PCIOL was injected into the bag. Post-implantation, there were no rents or tears in the bag and the lens was noted to be stable and centered. The residual Viscoelastic was then removed with irrigation and aspiration.  The wound was checked and found to be without leaks. Therefore a Polydex  ointment and one drop of a Prednisilone eye drop was placed in the eye.     Implant Information:   Implant Name Type Inv. Item Serial No.  Lot No. LRB No. Used Action   LENS ACRYSOF IQ SA60WF W/ULTRASERT 6X13MM ACU0T0 20.5 - [id number] - [id number] Implant LENS ACRYSOF IQ SA60WF W/ULTRASERT 6X13MM ACU0T0 20.5 [id number] DANIELLE NA Right 1 Implanted       Complications: None     []   Changed to complex procedure due to: []  hypermature, white cataract. Blue dye was used. []   small iris. A Malyugin Ring was used.    Discharge and Condition  The patient was transported to same day surgery in excellent condition and scheduled for follow-up tomorrow morning. The patient was given instructions on use of eye drops for the operative eye and was specifically instructed to call Dr. Mcmanus at his office or home for any nausea, vomiting, headache, decreased visual acuity, or pain, or if the patient had any general concerns.    Gio Mcmanus MD  6/21/2021

## 2021-06-21 NOTE — ANESTHESIA PREPROCEDURE EVALUATION
Anesthesia Evaluation     Patient summary reviewed and Nursing notes reviewed   NPO Solid Status: > 8 hours  NPO Liquid Status: > 8 hours           Airway   Mallampati: II  TM distance: >3 FB  Neck ROM: full  No difficulty expected  Dental      Pulmonary    Cardiovascular   Exercise tolerance: good (4-7 METS)    (+) hypertension, hyperlipidemia,       Neuro/Psych  GI/Hepatic/Renal/Endo      Musculoskeletal     Abdominal    Substance History      OB/GYN          Other   arthritis,                      Anesthesia Plan    ASA 2     MAC     intravenous induction     Anesthetic plan, all risks, benefits, and alternatives have been provided, discussed and informed consent has been obtained with: patient.    Plan discussed with CRNA.

## 2021-06-21 NOTE — H&P
[facility]         History and Physical    Patient Name: Ted Saldivar  MRN: [id number]  : 1940  Gender: male     HPI: Patient complaint of PPLOV Right eye diagnosed with cataract. Patient requests PHACO PCIOL for Increase of VA/ADL.    History:    Past Medical History:   Diagnosis Date   • Arthritis    • Hyperlipidemia    • Hypertension        Past Surgical History:   Procedure Laterality Date   • HERNIA REPAIR Bilateral    • ROTATOR CUFF REPAIR Right    • TOTAL HIP ARTHROPLASTY Left 2018       Social History     Socioeconomic History   • Marital status:      Spouse name: Not on file   • Number of children: Not on file   • Years of education: Not on file   • Highest education level: Not on file   Tobacco Use   • Smoking status: Never Smoker   • Smokeless tobacco: Never Used   Substance and Sexual Activity   • Alcohol use: Yes     Comment: SOCIALLY   • Drug use: No   • Sexual activity: Defer       Family History   Problem Relation Age of Onset   • Cancer Mother         lung   • Cancer Sister         leukemia       Prior to Admission Medications:  Medications Prior to Admission   Medication Sig Dispense Refill Last Dose   • folic acid (FOLVITE) 1 MG tablet Take 1 tablet by mouth Daily. 90 tablet 1 2021 at 0800   • lisinopril (PRINIVIL,ZESTRIL) 40 MG tablet Take 1 tablet by mouth Daily. 90 tablet 3 2021 at 2100   • simvastatin (ZOCOR) 10 MG tablet Take 1 tablet by mouth Every Night. 90 tablet 3 2021 at 2100   • dicyclomine (BENTYL) 20 MG tablet Take 1 tablet by mouth Every 6 (Six) Hours As Needed (abdominal pain). 10 tablet 0 Unknown at Unknown time   • meclizine (ANTIVERT) 25 MG tablet Take 1 tablet by mouth 3 (Three) Times a Day As Needed for dizziness. (Patient taking differently: Take 25 mg by mouth As Needed for Dizziness.) 90 tablet 1 More than a month at Unknown time   • methotrexate 2.5 MG tablet Take 8 tablets by mouth 1 (One) Time Per  Week. On thursday 6/17/2021   • ondansetron ODT (ZOFRAN-ODT) 4 MG disintegrating tablet Place 1 tablet on the tongue Every 8 (Eight) Hours As Needed for Nausea or Vomiting. 12 tablet 0 More than a month at Unknown time       Allergies:  No Known Allergies     Vitals: Temp:  [97.5 °F (36.4 °C)] 97.5 °F (36.4 °C)  Heart Rate:  [61] 61  Resp:  [16] 16  BP: (135)/(62) 135/62    Review of Systems:   Within Normal Limits Abnormal   HEENT [x]    []     Cardiovascular [x]   []     Gastrointestinal [x]   []     Genitourinary [x]   []     Neurologic [x]   []     Pulmonary [x]   []       Physical Exam:   Within Normal Limits Abnormal   HEENT [x]    []     Heart [x]   []     Lungs [x]   []     Abdomen [x]   []     Extremities [x]   []       Impression: Right nuclear sclerotic cataract.     Plan: CATARACT PHACO EXTRACTION WITH INTRAOCULAR LENS IMPLANT RIGHT (Right)     Gio Mcmanus MD  6/21/2021

## 2021-07-23 ENCOUNTER — PREP FOR SURGERY (OUTPATIENT)
Dept: OTHER | Facility: HOSPITAL | Age: 81
End: 2021-07-23

## 2021-07-23 DIAGNOSIS — H25.12 NUCLEAR SCLEROTIC CATARACT OF LEFT EYE: Primary | ICD-10-CM

## 2021-07-23 RX ORDER — SODIUM CHLORIDE 0.9 % (FLUSH) 0.9 %
1-10 SYRINGE (ML) INJECTION AS NEEDED
Status: CANCELLED | OUTPATIENT
Start: 2021-07-23

## 2021-07-23 RX ORDER — CYCLOPENT/TROPIC/PHEN/KETR/WAT 1%-1%-2.5%
1 DROPS (EA) OPHTHALMIC (EYE)
Status: CANCELLED | OUTPATIENT
Start: 2021-07-23 | End: 2021-07-23

## 2021-07-23 RX ORDER — PREDNISOLONE ACETATE 10 MG/ML
1 SUSPENSION/ DROPS OPHTHALMIC SEE ADMIN INSTRUCTIONS
Status: CANCELLED | OUTPATIENT
Start: 2021-07-23

## 2021-07-23 RX ORDER — SODIUM CHLORIDE 0.9 % (FLUSH) 0.9 %
10 SYRINGE (ML) INJECTION EVERY 12 HOURS SCHEDULED
Status: CANCELLED | OUTPATIENT
Start: 2021-07-23

## 2021-07-23 RX ORDER — TETRACAINE HYDROCHLORIDE 5 MG/ML
1 SOLUTION OPHTHALMIC SEE ADMIN INSTRUCTIONS
Status: CANCELLED | OUTPATIENT
Start: 2021-07-23

## 2021-08-02 ENCOUNTER — HOSPITAL ENCOUNTER (OUTPATIENT)
Facility: HOSPITAL | Age: 81
Setting detail: HOSPITAL OUTPATIENT SURGERY
Discharge: HOME | End: 2021-08-02
Attending: OPHTHALMOLOGY | Admitting: OPHTHALMOLOGY

## 2021-08-02 ENCOUNTER — ANESTHESIA (OUTPATIENT)
Dept: PERIOP | Facility: HOSPITAL | Age: 81
End: 2021-08-02

## 2021-08-02 ENCOUNTER — ANESTHESIA EVENT (OUTPATIENT)
Dept: PERIOP | Facility: HOSPITAL | Age: 81
End: 2021-08-02

## 2021-08-02 VITALS
WEIGHT: 182 LBS | BODY MASS INDEX: 26.05 KG/M2 | HEART RATE: 70 BPM | TEMPERATURE: 97.6 F | HEIGHT: 70 IN | DIASTOLIC BLOOD PRESSURE: 64 MMHG | SYSTOLIC BLOOD PRESSURE: 104 MMHG | RESPIRATION RATE: 18 BRPM | OXYGEN SATURATION: 99 %

## 2021-08-02 DIAGNOSIS — H25.12 NUCLEAR SCLEROTIC CATARACT OF LEFT EYE: ICD-10-CM

## 2021-08-02 PROCEDURE — 25010000002 PROPOFOL 10 MG/ML EMULSION: Performed by: NURSE ANESTHETIST, CERTIFIED REGISTERED

## 2021-08-02 PROCEDURE — V2632 POST CHMBR INTRAOCULAR LENS: HCPCS | Performed by: OPHTHALMOLOGY

## 2021-08-02 DEVICE — LENS ACRYSOF IQ SA60WF W/ULTRASERT 6X13MM ACU0T0 20.5: Type: IMPLANTABLE DEVICE | Site: POSTERIOR CHAMBER | Status: FUNCTIONAL

## 2021-08-02 RX ORDER — TETRACAINE HYDROCHLORIDE 5 MG/ML
1 SOLUTION OPHTHALMIC SEE ADMIN INSTRUCTIONS
Status: COMPLETED | OUTPATIENT
Start: 2021-08-02 | End: 2021-08-02

## 2021-08-02 RX ORDER — PREDNISOLONE ACETATE 10 MG/ML
1 SUSPENSION/ DROPS OPHTHALMIC SEE ADMIN INSTRUCTIONS
Status: DISCONTINUED | OUTPATIENT
Start: 2021-08-02 | End: 2021-08-02 | Stop reason: HOSPADM

## 2021-08-02 RX ORDER — SODIUM CHLORIDE, SODIUM LACTATE, POTASSIUM CHLORIDE, CALCIUM CHLORIDE 600; 310; 30; 20 MG/100ML; MG/100ML; MG/100ML; MG/100ML
25 INJECTION, SOLUTION INTRAVENOUS CONTINUOUS
Status: DISCONTINUED | OUTPATIENT
Start: 2021-08-02 | End: 2021-08-02 | Stop reason: HOSPADM

## 2021-08-02 RX ORDER — SODIUM CHLORIDE 0.9 % (FLUSH) 0.9 %
10 SYRINGE (ML) INJECTION EVERY 12 HOURS SCHEDULED
Status: DISCONTINUED | OUTPATIENT
Start: 2021-08-02 | End: 2021-08-02 | Stop reason: HOSPADM

## 2021-08-02 RX ORDER — ACETAZOLAMIDE 500 MG/1
500 CAPSULE, EXTENDED RELEASE ORAL ONCE
Status: COMPLETED | OUTPATIENT
Start: 2021-08-02 | End: 2021-08-02

## 2021-08-02 RX ORDER — SODIUM CHLORIDE 0.9 % (FLUSH) 0.9 %
1-10 SYRINGE (ML) INJECTION AS NEEDED
Status: DISCONTINUED | OUTPATIENT
Start: 2021-08-02 | End: 2021-08-02 | Stop reason: HOSPADM

## 2021-08-02 RX ORDER — KETAMINE HCL IN NACL, ISO-OSM 100MG/10ML
SYRINGE (ML) INJECTION AS NEEDED
Status: DISCONTINUED | OUTPATIENT
Start: 2021-08-02 | End: 2021-08-02 | Stop reason: SURG

## 2021-08-02 RX ORDER — LIDOCAINE HYDROCHLORIDE 40 MG/ML
INJECTION, SOLUTION RETROBULBAR; TOPICAL AS NEEDED
Status: DISCONTINUED | OUTPATIENT
Start: 2021-08-02 | End: 2021-08-02 | Stop reason: HOSPADM

## 2021-08-02 RX ORDER — BALANCED SALT SOLUTION 6.4; .75; .48; .3; 3.9; 1.7 MG/ML; MG/ML; MG/ML; MG/ML; MG/ML; MG/ML
SOLUTION OPHTHALMIC AS NEEDED
Status: DISCONTINUED | OUTPATIENT
Start: 2021-08-02 | End: 2021-08-02 | Stop reason: HOSPADM

## 2021-08-02 RX ORDER — PREDNISOLONE ACETATE 10 MG/ML
SUSPENSION/ DROPS OPHTHALMIC AS NEEDED
Status: DISCONTINUED | OUTPATIENT
Start: 2021-08-02 | End: 2021-08-02 | Stop reason: HOSPADM

## 2021-08-02 RX ORDER — CYCLOPENT/TROPIC/PHEN/KETR/WAT 1%-1%-2.5%
1 DROPS (EA) OPHTHALMIC (EYE)
Status: COMPLETED | OUTPATIENT
Start: 2021-08-02 | End: 2021-08-02

## 2021-08-02 RX ORDER — NEOMYCIN SULFATE, POLYMYXIN B SULFATE, AND DEXAMETHASONE 3.5; 10000; 1 MG/G; [USP'U]/G; MG/G
OINTMENT OPHTHALMIC AS NEEDED
Status: DISCONTINUED | OUTPATIENT
Start: 2021-08-02 | End: 2021-08-02 | Stop reason: HOSPADM

## 2021-08-02 RX ORDER — TETRACAINE HYDROCHLORIDE 5 MG/ML
SOLUTION OPHTHALMIC AS NEEDED
Status: DISCONTINUED | OUTPATIENT
Start: 2021-08-02 | End: 2021-08-02 | Stop reason: HOSPADM

## 2021-08-02 RX ORDER — LIDOCAINE HYDROCHLORIDE 20 MG/ML
INJECTION, SOLUTION INTRAVENOUS AS NEEDED
Status: DISCONTINUED | OUTPATIENT
Start: 2021-08-02 | End: 2021-08-02 | Stop reason: SURG

## 2021-08-02 RX ADMIN — SODIUM CHLORIDE, POTASSIUM CHLORIDE, SODIUM LACTATE AND CALCIUM CHLORIDE 25 ML/HR: 600; 310; 30; 20 INJECTION, SOLUTION INTRAVENOUS at 12:47

## 2021-08-02 RX ADMIN — TETRACAINE HYDROCHLORIDE 1 DROP: 5 SOLUTION OPHTHALMIC at 12:42

## 2021-08-02 RX ADMIN — Medication 1 DROP: at 12:43

## 2021-08-02 RX ADMIN — Medication 1 DROP: at 12:48

## 2021-08-02 RX ADMIN — TETRACAINE HYDROCHLORIDE 1 DROP: 5 SOLUTION OPHTHALMIC at 12:41

## 2021-08-02 RX ADMIN — Medication 25 MG: at 13:11

## 2021-08-02 RX ADMIN — LIDOCAINE HYDROCHLORIDE 60 MG: 20 INJECTION, SOLUTION INTRAVENOUS at 13:11

## 2021-08-02 RX ADMIN — ACETAZOLAMIDE 500 MG: 500 CAPSULE, EXTENDED RELEASE ORAL at 13:39

## 2021-08-02 RX ADMIN — Medication 1 DROP: at 12:53

## 2021-08-02 RX ADMIN — PROPOFOL 100 MCG/KG/MIN: 10 INJECTION, EMULSION INTRAVENOUS at 13:11

## 2021-08-02 NOTE — OP NOTE
OPERATIVE NOTE    Date of Procedure: 8/2/2021  Patient Name: Ted Saldivar  Patient MRN: [id number]  YOB: 1940     Preoperative Diagnosis: Left nuclear sclerotic cataract.     Postoperative Diagnosis: Left nuclear sclerotic cataract.     Procedure Performed: Phacoemulsification with implantation of a  foldable posterior chamber intraocular lens, Left eye.     Surgeon: Gio Mcmanus MD     Anesthesia:  Monitored Anesthesia Care (MAC)      Brief History and Indication: The patient presents with a history of past progressive loss of vision.  Patient was diagnosed with cataract and requests removal for increased ability to read and see.     Operation Description: The patient was taken to the OR and prepped and draped in the usual sterile ophthalmic fashion. A lid speculum was placed in the eye.  A #75 blade was then used to make a stab incision two o’clock hours from the intended temporal clear cornea groove. The anterior chamber was then inflated with a Viscoelastic. A metal microkeratome blade was then used to enter the anterior chamber at the temporal clear cornea site. A three level tunnel incision was made. A curvilinear capsulorrhexis was then performed with a bent cystotome needle and capsulorrhexis forceps.  BSS on a 30 gauge bent cannula was used to hydro-dissect, and hydro-delineate the lens. Good fluid waves and hydro-delineation were noted. Phacoemulsification was then used to remove nuclear material without complications. The residual cortical and lenticular material was then removed with irrigation and aspiration. Viscoelastics were then used to inflate the bag in a soft shell technique. A PCIOL was injected into the bag. Post-implantation, there were no rents or tears in the bag and the lens was noted to be stable and centered. The residual Viscoelastic was then removed with irrigation and aspiration.  The wound was checked and found to be without leaks. Therefore a Polydex ointment  and one drop of a Prednisilone eye drop was placed in the eye.     Implant Information:   Implant Name Type Inv. Item Serial No.  Lot No. LRB No. Used Action   LENS ACRYSOF IQ SA60WF W/ULTRASERT 6X13MM ACU0T0 20.5 - [id number] - [id number] Implant LENS ACRYSOF IQ SA60WF W/ULTRASERT 6X13MM ACU0T0 20.5 [id number] DANIELLE  Left 1 Implanted       Complications: None     []   Changed to complex procedure due to: []  hypermature, white cataract. Blue dye was used. [x]   small iris. A Malyugin Ring was used.    Discharge and Condition  The patient was transported to same day surgery in excellent condition and scheduled for follow-up tomorrow morning. The patient was given instructions on use of eye drops for the operative eye and was specifically instructed to call Dr. Mcmanus at his office or home for any nausea, vomiting, headache, decreased visual acuity, or pain, or if the patient had any general concerns.    Gio Mcmanus MD  8/2/2021

## 2021-08-02 NOTE — ANESTHESIA POSTPROCEDURE EVALUATION
Patient: Ted Saldivar    Procedure Summary     Date: 08/02/21 Room / Location: [facility] OR 1 /  [facility] OR    Anesthesia Start: 1313 Anesthesia Stop: 1332    Procedure: CATARACT PHACO EXTRACTION WITH INTRAOCULAR LENS IMPLANT LEFT complex with malyugin ring (Left Eye) Diagnosis:       Nuclear sclerotic cataract of left eye      (Nuclear sclerotic cataract of left eye [H25.12])    Surgeons: Gio Mcmanus MD Provider: Fox Fenton CRNA    Anesthesia Type: MAC ASA Status: 3          Anesthesia Type: MAC    Vitals  No vitals data found for the desired time range.          Post Anesthesia Care and Evaluation    Patient location during evaluation: bedside  Patient participation: complete - patient participated  Level of consciousness: awake and alert  Pain score: 0  Pain management: adequate  Airway patency: patent  Anesthetic complications: No anesthetic complications  PONV Status: none  Cardiovascular status: acceptable  Respiratory status: acceptable  Hydration status: acceptable

## 2021-08-02 NOTE — DISCHARGE INSTRUCTIONS
Post Operative Cataract Instructions     Left Eye  POST OPERATIVE INSTRUCTIONS  • You have received anesthesia today. DO NOT drive, drink alcohol, sign legal documents.   • After surgery, your eye will not hurt. It may feel scratchy, sticky or uncomfortable. Your eye will be sensitive to light.  • Most people see better 1-3 days after the procedure, but it could take 3 weeks to get the full benefits and reach your visual potential. If your vision is blurry for a few days it is normal, and means you may have swelling outside or inside the eye. For some patients, a bubble is placed and there will be blurriness.   • You should receive a post-op kit with tape and an eye shield. Wear the shield for the first 3 nights after surgery to keep you from rubbing the eye.  • You may wear glasses or sunglasses during the day.  You must keep eye protected at all times.  • Most people are able to return to their normal routine 1-3 days after surgery, however, due to the brain adjusting to your new vision you may have trouble judging distances and want to be careful when driving and going up and downstairs.   • You can shower and wash your hair the day after surgery. Keep water, shampoo, hair spray and shaving lotion out of the eye, especially for the first week.   • If eyes become stuck together after surgery you may soak with warm cloth.  • During the first week, you should AVOID:   1. Rubbing or putting pressure on your eye.  2. Eye make-up, face cream or lotion, hair coloring or perms  3. Strenuous activities, such as running or lifting weights, as to avoid sweat from getting in the eye. Avoid swimming, hot tubs, fumes or moi conditions.   4. Sleeping on operative side.  5. Excessive sneezing or coughing.  6. Hanging the head down for periods of time. Keep your head above your heart.    Some discomfort and blurred vision after surgery are normal, but if you have any unusual pain, swelling, bleeding or sudden decrease in  vision, contact our office immediately.     Emergency assistance is available at any time by calling:    Dr.Mark Yonathan Mcmanus  [phone]    If unable to reach call [facility] @ [phone]    You have been prescribed an eye drop to use after surgery, please follow these instructions and bring eye drops and instructions with you to post op appointment:    Prednisolone Acetate: Shake well before use    USE 1 DROP 4 (FOUR) TIMES A DAY FOR 1 WEEK THEN STARTING WEEK 2, ONLY USE 2 (TWO) TIMES A DAY UNTIL YOU RUN OUT OF DROPS.     PLACE A BENJAMIN IN THE DAY COLUMN EACH TIME YOU USE TO KEEP ON SCHEDULE    WEEK 1-USE 4  (FOUR) TIMES A DAY DAY 1  []   []    []   []     DAY 2  []   []    []   []  DAY 3  []   []    []   []  DAY 4  []   []    []   []  DAY 5  []   []    []   []  DAY 6  []   []    []   []  DAY 7  []   []    []   []      WEEK 2-USE 2 (TWO)  TIMES A DAY DAY 1  []   []  DAY 2  []   []  DAY 3  []   []  DAY 4  []   []  DAY 5  []   []  DAY 6  []   []  DAY 7  []   []      WEEK 3-USE 2 (TWO) TIMES A DAY DAY 1  []   []  DAY 2  []   []  DAY 3  []   []  DAY 4  []   []  DAY 5  []   []  DAY 6  []   []  DAY 7  []   []      WEEK 4-USE 2 (TWO)TIMES A DAY DAY 1  []   []  DAY 2  []   []  DAY 3  []   []  DAY 4  []   []  DAY 5  []   []  DAY 6  []   []  DAY 7  []   []          No pushing, pulling, tugging,  heavy lifting, or strenuous activity.  No major decision making, driving, or drinking alcoholic beverages for 24 hours. ( due to the medications you have  received)  Always use good hand hygiene/washing techniques.  NO driving while taking pain medications.    * if you have an incision:  Check your incision area every day for signs of infection.   Check for:  * more redness, swelling, or pain  *more fluid or blood  *warmth  *pus or bad smell    To assist you in voiding:  Drink plenty of fluids  Listen to running water while attempting to void.    If you  are unable to urinate and you have an uncomfortable urge to void or it has been   6 hours since you were discharged, return to the Emergency Room

## 2021-08-02 NOTE — H&P
[facility]         History and Physical    Patient Name: Ted Saldivar  MRN: [id number]  : 1940  Gender: male     HPI: Patient complaint of PPLOV Left eye diagnosed with cataract. Patient requests PHACO PCIOL for Increase of VA/ADL.    History:    Past Medical History:   Diagnosis Date   • Arthritis    • Hyperlipidemia    • Hypertension        Past Surgical History:   Procedure Laterality Date   • CATARACT EXTRACTION W/ INTRAOCULAR LENS IMPLANT Right 2021    Procedure: CATARACT PHACO EXTRACTION WITH INTRAOCULAR LENS IMPLANT RIGHT;  Surgeon: Gio Mcmanus MD;  Location: [facility];  Service: Ophthalmology;  Laterality: Right;   • HERNIA REPAIR Bilateral    • ROTATOR CUFF REPAIR Right    • TOTAL HIP ARTHROPLASTY Left 2018       Social History     Socioeconomic History   • Marital status:      Spouse name: Not on file   • Number of children: Not on file   • Years of education: Not on file   • Highest education level: Not on file   Tobacco Use   • Smoking status: Never Smoker   • Smokeless tobacco: Never Used   Vaping Use   • Vaping Use: Never used   Substance and Sexual Activity   • Alcohol use: Yes     Comment: SOCIALLY   • Drug use: No   • Sexual activity: Defer       Family History   Problem Relation Age of Onset   • Cancer Mother         lung   • Cancer Sister         leukemia       Prior to Admission Medications:  Medications Prior to Admission   Medication Sig Dispense Refill Last Dose   • folic acid (FOLVITE) 1 MG tablet Take 1 tablet by mouth Daily. 90 tablet 1 2021 at Unknown time   • lisinopril (PRINIVIL,ZESTRIL) 40 MG tablet Take 1 tablet by mouth Daily. 90 tablet 3 2021 at Unknown time   • meclizine (ANTIVERT) 25 MG tablet Take 1 tablet by mouth 3 (Three) Times a Day As Needed for dizziness. (Patient taking differently: Take 25 mg by mouth As Needed for Dizziness.) 90 tablet 1 Past Week at Unknown time   • methotrexate 2.5 MG tablet  Take 8 tablets by mouth 1 (One) Time Per Week. On thursday   Past Week at Unknown time   • simvastatin (ZOCOR) 10 MG tablet Take 1 tablet by mouth Every Night. 90 tablet 3 8/1/2021 at Unknown time       Allergies:  No Known Allergies     Vitals: Temp:  [97.8 °F (36.6 °C)] 97.8 °F (36.6 °C)  Heart Rate:  [60] 60  Resp:  [18] 18  BP: (139)/(68) 139/68    Review of Systems:   Within Normal Limits Abnormal   HEENT [x]    []     Cardiovascular [x]   []     Gastrointestinal [x]   []     Genitourinary [x]   []     Neurologic [x]   []     Pulmonary [x]   []       Physical Exam:   Within Normal Limits Abnormal   HEENT [x]    []     Heart [x]   []     Lungs [x]   []     Abdomen [x]   []     Extremities [x]   []       Impression: Left nuclear sclerotic cataract.     Plan: CATARACT PHACO EXTRACTION WITH INTRAOCULAR LENS IMPLANT LEFT (Left)     Gio Mcmanus MD  8/2/2021

## 2021-08-02 NOTE — ANESTHESIA PREPROCEDURE EVALUATION
Anesthesia Evaluation     Patient summary reviewed and Nursing notes reviewed   no history of anesthetic complications:  NPO Solid Status: > 8 hours  NPO Liquid Status: > 8 hours           Airway   Mallampati: II  TM distance: >3 FB  Neck ROM: full  No difficulty expected  Dental      Pulmonary    (-) not a smoker  Cardiovascular   Exercise tolerance: good (4-7 METS)    (+) hypertension, hyperlipidemia,       Neuro/Psych  GI/Hepatic/Renal/Endo      Musculoskeletal     (+) arthralgias, back pain, chronic pain, myalgias,   Abdominal    Substance History      OB/GYN          Other   arthritis,                        Anesthesia Plan    ASA 3     MAC     intravenous induction     Anesthetic plan, all risks, benefits, and alternatives have been provided, discussed and informed consent has been obtained with: patient.    Plan discussed with CRNA.

## 2021-09-27 DIAGNOSIS — I10 BENIGN ESSENTIAL HYPERTENSION: ICD-10-CM

## 2021-09-27 DIAGNOSIS — E78.2 MIXED HYPERLIPIDEMIA: ICD-10-CM

## 2021-09-28 RX ORDER — SIMVASTATIN 10 MG
TABLET ORAL
Qty: 90 TABLET | Refills: 0 | Status: SHIPPED | OUTPATIENT
Start: 2021-09-28 | End: 2022-01-05 | Stop reason: SDUPTHER

## 2021-09-28 RX ORDER — LISINOPRIL 40 MG/1
40 TABLET ORAL DAILY
Qty: 90 TABLET | Refills: 0 | Status: SHIPPED | OUTPATIENT
Start: 2021-09-28 | End: 2022-01-05 | Stop reason: SDUPTHER

## 2021-12-21 DIAGNOSIS — I10 BENIGN ESSENTIAL HYPERTENSION: ICD-10-CM

## 2021-12-21 DIAGNOSIS — E78.2 MIXED HYPERLIPIDEMIA: ICD-10-CM

## 2021-12-22 RX ORDER — LISINOPRIL 40 MG/1
40 TABLET ORAL DAILY
Qty: 90 TABLET | Refills: 3 | OUTPATIENT
Start: 2021-12-22

## 2021-12-22 RX ORDER — SIMVASTATIN 10 MG
TABLET ORAL
Qty: 90 TABLET | Refills: 3 | OUTPATIENT
Start: 2021-12-22

## 2021-12-22 NOTE — TELEPHONE ENCOUNTER
Spoke with pts wife and informed her a appointment is needed for refills. Scheduled with Dr. Natarajan 1/5/22 has enough medication til then

## 2022-01-05 ENCOUNTER — OFFICE VISIT (OUTPATIENT)
Dept: INTERNAL MEDICINE | Facility: CLINIC | Age: 82
End: 2022-01-05

## 2022-01-05 VITALS
TEMPERATURE: 97.7 F | SYSTOLIC BLOOD PRESSURE: 138 MMHG | DIASTOLIC BLOOD PRESSURE: 77 MMHG | RESPIRATION RATE: 16 BRPM | WEIGHT: 188 LBS | HEART RATE: 71 BPM | HEIGHT: 70 IN | BODY MASS INDEX: 26.92 KG/M2 | OXYGEN SATURATION: 96 %

## 2022-01-05 DIAGNOSIS — D48.9 NEOPLASM OF UNCERTAIN BEHAVIOR: ICD-10-CM

## 2022-01-05 DIAGNOSIS — N52.9 ERECTILE DYSFUNCTION, UNSPECIFIED ERECTILE DYSFUNCTION TYPE: ICD-10-CM

## 2022-01-05 DIAGNOSIS — I10 BENIGN ESSENTIAL HYPERTENSION: Primary | ICD-10-CM

## 2022-01-05 DIAGNOSIS — M06.9 RHEUMATOID ARTHRITIS OF HAND, UNSPECIFIED LATERALITY, UNSPECIFIED WHETHER RHEUMATOID FACTOR PRESENT: ICD-10-CM

## 2022-01-05 DIAGNOSIS — E78.2 MIXED HYPERLIPIDEMIA: ICD-10-CM

## 2022-01-05 LAB
ALBUMIN SERPL-MCNC: 4.5 G/DL (ref 3.5–5.2)
ALBUMIN/GLOB SERPL: 2.1 G/DL
ALP SERPL-CCNC: 82 U/L (ref 39–117)
ALT SERPL-CCNC: 25 U/L (ref 1–41)
AST SERPL-CCNC: 22 U/L (ref 1–40)
BASOPHILS # BLD AUTO: 0.07 10*3/MM3 (ref 0–0.2)
BASOPHILS NFR BLD AUTO: 0.8 % (ref 0–1.5)
BILIRUB SERPL-MCNC: 0.5 MG/DL (ref 0–1.2)
BUN SERPL-MCNC: 23 MG/DL (ref 8–23)
BUN/CREAT SERPL: 22.8 (ref 7–25)
CALCIUM SERPL-MCNC: 9.5 MG/DL (ref 8.6–10.5)
CHLORIDE SERPL-SCNC: 106 MMOL/L (ref 98–107)
CHOLEST SERPL-MCNC: 186 MG/DL (ref 0–200)
CO2 SERPL-SCNC: 27.6 MMOL/L (ref 22–29)
CREAT SERPL-MCNC: 1.01 MG/DL (ref 0.76–1.27)
EOSINOPHIL # BLD AUTO: 0.12 10*3/MM3 (ref 0–0.4)
EOSINOPHIL NFR BLD AUTO: 1.4 % (ref 0.3–6.2)
ERYTHROCYTE [DISTWIDTH] IN BLOOD BY AUTOMATED COUNT: 13.5 % (ref 12.3–15.4)
GLOBULIN SER CALC-MCNC: 2.1 GM/DL
GLUCOSE SERPL-MCNC: 93 MG/DL (ref 65–99)
HCT VFR BLD AUTO: 39 % (ref 37.5–51)
HDLC SERPL-MCNC: 49 MG/DL (ref 40–60)
HGB BLD-MCNC: 13.3 G/DL (ref 13–17.7)
IMM GRANULOCYTES # BLD AUTO: 0.03 10*3/MM3 (ref 0–0.05)
IMM GRANULOCYTES NFR BLD AUTO: 0.4 % (ref 0–0.5)
LDLC SERPL CALC-MCNC: 122 MG/DL (ref 0–100)
LYMPHOCYTES # BLD AUTO: 1.14 10*3/MM3 (ref 0.7–3.1)
LYMPHOCYTES NFR BLD AUTO: 13.4 % (ref 19.6–45.3)
MCH RBC QN AUTO: 32.8 PG (ref 26.6–33)
MCHC RBC AUTO-ENTMCNC: 34.1 G/DL (ref 31.5–35.7)
MCV RBC AUTO: 96.1 FL (ref 79–97)
MONOCYTES # BLD AUTO: 0.57 10*3/MM3 (ref 0.1–0.9)
MONOCYTES NFR BLD AUTO: 6.7 % (ref 5–12)
NEUTROPHILS # BLD AUTO: 6.58 10*3/MM3 (ref 1.7–7)
NEUTROPHILS NFR BLD AUTO: 77.3 % (ref 42.7–76)
NRBC BLD AUTO-RTO: 0 /100 WBC (ref 0–0.2)
PLATELET # BLD AUTO: 269 10*3/MM3 (ref 140–450)
POTASSIUM SERPL-SCNC: 5 MMOL/L (ref 3.5–5.2)
PROT SERPL-MCNC: 6.6 G/DL (ref 6–8.5)
RBC # BLD AUTO: 4.06 10*6/MM3 (ref 4.14–5.8)
SODIUM SERPL-SCNC: 142 MMOL/L (ref 136–145)
TRIGL SERPL-MCNC: 84 MG/DL (ref 0–150)
VLDLC SERPL CALC-MCNC: 15 MG/DL (ref 5–40)
WBC # BLD AUTO: 8.51 10*3/MM3 (ref 3.4–10.8)

## 2022-01-05 PROCEDURE — 99214 OFFICE O/P EST MOD 30 MIN: CPT | Performed by: INTERNAL MEDICINE

## 2022-01-05 RX ORDER — SIMVASTATIN 10 MG
10 TABLET ORAL
Qty: 90 TABLET | Refills: 1 | Status: SHIPPED | OUTPATIENT
Start: 2022-01-05 | End: 2022-04-26 | Stop reason: SDUPTHER

## 2022-01-05 RX ORDER — LISINOPRIL 40 MG/1
40 TABLET ORAL DAILY
Qty: 90 TABLET | Refills: 1 | Status: SHIPPED | OUTPATIENT
Start: 2022-01-05 | End: 2022-04-26 | Stop reason: SDUPTHER

## 2022-01-05 RX ORDER — SILDENAFIL 100 MG/1
100 TABLET, FILM COATED ORAL NIGHTLY PRN
Qty: 10 TABLET | Refills: 3 | Status: SHIPPED | OUTPATIENT
Start: 2022-01-05

## 2022-01-05 NOTE — PROGRESS NOTES
"Chief Complaint  Hypertension, Hyperlipidemia, Joint Pain, and Rash    Subjective          Ted Saldivar presents to [facility]  History of Present Illness   HPI: Patient is here to follow up on the blood pressure  The patient is taking the blood pressure medications as prescribed and has had no side effects. The patient is also here to follow up on the cholesterol and is trying to follow a diet. The patient  is  due to get lab work done .  The patient also needs refills on medications .  He complains of a skin lesion on both his temple area, he also complains of erectile dysfunction, he follows up with rheumatology and is currently on methotrexate for rheumatoid arthritis  Hyperlipidemia   Pertinent negatives include no chest pain or shortness of breath.   Hypertension   Pertinent negatives include no chest pain, palpitations or shortness of breath.    Answers for HPI/ROS submitted by the patient on 1/3/2022  What is the primary reason for your visit?: Physical      Objective   Vital Signs:   /77   Pulse 71   Temp 97.7 °F (36.5 °C)   Resp 16   Ht 177.8 cm (70\")   Wt 85.3 kg (188 lb)   SpO2 96%   BMI 26.98 kg/m²     Physical Exam  Vitals and nursing note reviewed.   Constitutional:       General: He is not in acute distress.     Appearance: Normal appearance. He is not diaphoretic.   HENT:      Head: Normocephalic and atraumatic.      Right Ear: External ear normal.      Left Ear: External ear normal.      Nose: Nose normal.   Eyes:      Extraocular Movements: Extraocular movements intact.      Conjunctiva/sclera: Conjunctivae normal.   Neck:      Trachea: Trachea normal.   Cardiovascular:      Rate and Rhythm: Normal rate and regular rhythm.      Heart sounds: Normal heart sounds.   Pulmonary:      Effort: Pulmonary effort is normal. No respiratory distress.   Abdominal:      General: Abdomen is flat.   Musculoskeletal:      Cervical back: Neck supple.      Comments: Moves " all limbs   Skin:     General: Skin is warm and dry.      Findings: Lesion present. No erythema.      Comments: Both temples areas   Neurological:      Mental Status: He is alert and oriented to person, place, and time.      Comments: No gross motor or sensory deficits        Result Review :{Labs  Result Review  Imaging  Med Tab  Media  Procedures :23}     Common labs    Common Labsle 2/20/21 2/20/21 1/5/22 1/5/22 1/5/22    0448 0448 1042 1042 1042   Glucose  113 (A)  93    BUN  40 (A)  23    Creatinine  1.26  1.01    eGFR Non  Am  55 (A)  71    eGFR  Am    86    Sodium  140  142    Potassium  4.4  5.0    Chloride  106  106    Calcium  9.6  9.5    Total Protein    6.6    Albumin  4.00  4.50    Total Bilirubin  0.5  0.5    Alkaline Phosphatase  91  82    AST (SGOT)  18  22    ALT (SGPT)  14  25    WBC 10.69  8.51     Hemoglobin 13.9  13.3     Hematocrit 42.2  39.0     Platelets 309  269     Total Cholesterol     186   Triglycerides     84   HDL Cholesterol     49   LDL Cholesterol      122 (A)   (A) Abnormal value       Comments are available for some flowsheets but are not being displayed.                     Assessment and Plan    Diagnoses and all orders for this visit:    1. Benign essential hypertension (Primary)  -     lisinopril (PRINIVIL,ZESTRIL) 40 MG tablet; Take 1 tablet by mouth Daily.  Dispense: 90 tablet; Refill: 1    2. Mixed hyperlipidemia  -     CBC & Differential  -     Comprehensive Metabolic Panel  -     Lipid Panel  -     simvastatin (ZOCOR) 10 MG tablet; Take 1 tablet by mouth every night at bedtime.  Dispense: 90 tablet; Refill: 1    3. Rheumatoid arthritis of hand, unspecified laterality, unspecified whether rheumatoid factor present (HCC)    4. Neoplasm of uncertain behavior  -     Ambulatory Referral to Dermatology    5. Erectile dysfunction, unspecified erectile dysfunction type    Other orders  -     sildenafil (Viagra) 100 MG tablet; Take 1 tablet by mouth At Night As  Needed for Erectile Dysfunction.  Dispense: 10 tablet; Refill: 3    Plan:  1.  Benign essential hypertension: Will continue current medication, low-sodium diet advised, Counseled to regularly check BP at home with goal averaging <130/80.   2.mixed hyperlipidemia:  reviewed  fasting CMP and lipid panel.  Diet and exercise counseled,  Will continue current medications  3.  Rheumatoid arthritis: Per rheumatologist  4.  Neoplasm of uncertain behavior: Refer patient to dermatology  5. erectile dysfunction: As needed Viagra  I spent 30 minutes caring for Ted on this date of service. This time includes time spent by me in the following activities:preparing for the visit, reviewing tests, performing a medically appropriate examination and/or evaluation , counseling and educating the patient/family/caregiver, ordering medications, tests, or procedures and documenting information in the medical record  Follow Up   Return in about 16 weeks (around 4/27/2022).  Patient was given instructions and counseling regarding his condition or for health maintenance advice. Please see specific information pulled into the AVS if appropriate.

## 2022-01-05 NOTE — PATIENT INSTRUCTIONS
MyPlate from USDA    MyPlate is an outline of a general healthy diet based on the 2010 Dietary Guidelines for Americans, from the U.S. Department of Agriculture (USDA). It sets guidelines for how much food you should eat from each food group based on your age, sex, and level of physical activity.  What are tips for following MyPlate?  To follow MyPlate recommendations:  · Eat a wide variety of fruits and vegetables, grains, and protein foods.  · Serve smaller portions and eat less food throughout the day.  · Limit portion sizes to avoid overeating.  · Enjoy your food.  · Get at least 150 minutes of exercise every week. This is about 30 minutes each day, 5 or more days per week.  It can be difficult to have every meal look like MyPlate. Think about MyPlate as eating guidelines for an entire day, rather than each individual meal.  Fruits and vegetables  · Make half of your plate fruits and vegetables.  · Eat many different colors of fruits and vegetables each day.  · For a 2,000 calorie daily food plan, eat:  ? 2½ cups of vegetables every day.  ? 2 cups of fruit every day.  · 1 cup is equal to:  ? 1 cup raw or cooked vegetables.  ? 1 cup raw fruit.  ? 1 medium-sized orange, apple, or banana.  ? 1 cup 100% fruit or vegetable juice.  ? 2 cups raw leafy greens, such as lettuce, spinach, or kale.  ? ½ cup dried fruit.  Grains  · One fourth of your plate should be grains.  · Make at least half of the grains you eat each day whole grains.  · For a 2,000 calorie daily food plan, eat 6 oz of grains every day.  · 1 oz is equal to:  ? 1 slice bread.  ? 1 cup cereal.  ? ½ cup cooked rice, cereal, or pasta.  Protein  · One fourth of your plate should be protein.  · Eat a wide variety of protein foods, including meat, poultry, fish, eggs, beans, nuts, and tofu.  · For a 2,000 calorie daily food plan, eat 5½ oz of protein every day.  · 1 oz is equal to:  ? 1 oz meat, poultry, or fish.  ? ¼ cup cooked beans.  ? 1 egg.  ? ½ oz nuts  or seeds.  ? 1 Tbsp peanut butter.  Dairy  · Drink fat-free or low-fat (1%) milk.  · Eat or drink dairy as a side to meals.  · For a 2,000 calorie daily food plan, eat or drink 3 cups of dairy every day.  · 1 cup is equal to:  ? 1 cup milk, yogurt, cottage cheese, or soy milk (soy beverage).  ? 2 oz processed cheese.  ? 1½ oz natural cheese.  Fats, oils, salt, and sugars  · Only small amounts of oils are recommended.  · Avoid foods that are high in calories and low in nutritional value (empty calories), like foods high in fat or added sugars.  · Choose foods that are low in salt (sodium). Choose foods that have less than 140 milligrams (mg) of sodium per serving.  · Drink water instead of sugary drinks. Drink enough water each day to keep your urine pale yellow.  Where to find support  · Work with your health care provider or a nutrition specialist (dietitian) to develop a customized eating plan that is right for you.  · Download an brandon (mobile application) to help you track your daily food intake.  Where to find more information  · Go to ChooseMyPlate.gov for more information.  Summary  · MyPlate is a general guideline for healthy eating from the USDA. It is based on the 2010 Dietary Guidelines for Americans.  · In general, fruits and vegetables should take up ½ of your plate, grains should take up ¼ of your plate, and protein should take up ¼ of your plate.  This information is not intended to replace advice given to you by your health care provider. Make sure you discuss any questions you have with your health care provider.  Document Revised: 05/21/2020 Document Reviewed: 03/19/2018  Elsevier Patient Education © 2021 Elsevier Inc.

## 2022-01-06 DIAGNOSIS — I10 BENIGN ESSENTIAL HYPERTENSION: ICD-10-CM

## 2022-01-06 DIAGNOSIS — E78.2 MIXED HYPERLIPIDEMIA: ICD-10-CM

## 2022-01-07 RX ORDER — SIMVASTATIN 10 MG
TABLET ORAL
Qty: 90 TABLET | Refills: 3 | OUTPATIENT
Start: 2022-01-07

## 2022-01-07 RX ORDER — LISINOPRIL 40 MG/1
40 TABLET ORAL DAILY
Qty: 90 TABLET | Refills: 3 | OUTPATIENT
Start: 2022-01-07

## 2022-02-05 ENCOUNTER — TELEMEDICINE (OUTPATIENT)
Dept: INTERNAL MEDICINE | Facility: CLINIC | Age: 82
End: 2022-02-05

## 2022-02-05 VITALS — TEMPERATURE: 99.7 F | OXYGEN SATURATION: 96 % | HEART RATE: 63 BPM

## 2022-02-05 DIAGNOSIS — J20.8 ACUTE BRONCHITIS DUE TO OTHER SPECIFIED ORGANISMS: ICD-10-CM

## 2022-02-05 DIAGNOSIS — Z20.822 SUSPECTED 2019 NOVEL CORONAVIRUS INFECTION: ICD-10-CM

## 2022-02-05 DIAGNOSIS — J06.0 ACUTE LARYNGOPHARYNGITIS: ICD-10-CM

## 2022-02-05 DIAGNOSIS — J01.01 ACUTE RECURRENT MAXILLARY SINUSITIS: ICD-10-CM

## 2022-02-05 DIAGNOSIS — I10 BENIGN ESSENTIAL HYPERTENSION: Primary | ICD-10-CM

## 2022-02-05 PROCEDURE — 99214 OFFICE O/P EST MOD 30 MIN: CPT | Performed by: INTERNAL MEDICINE

## 2022-02-05 RX ORDER — AZITHROMYCIN 250 MG/1
TABLET, FILM COATED ORAL
Qty: 6 TABLET | Refills: 0 | Status: SHIPPED | OUTPATIENT
Start: 2022-02-05 | End: 2022-04-26

## 2022-02-05 RX ORDER — ALBUTEROL SULFATE 90 UG/1
1 AEROSOL, METERED RESPIRATORY (INHALATION) EVERY 6 HOURS PRN
Qty: 18 G | Refills: 3 | Status: SHIPPED | OUTPATIENT
Start: 2022-02-05

## 2022-02-05 NOTE — PROGRESS NOTES
ou have chosen to receive care through a video  visit. Do you consent to use a video visit for your medical care today? Yes  Parties active with  visit via My Chart  Physician: Sheila Grossman MD  Nurse: Misbah Kenney CMA  Patient : Ted Saldivar  Location of Provider : Clinic - [place], [place]  Location of Patient : at home  Patient presents during the COVID-19 pandemic/federally declared state of public health emergency.   was seen via telehealth using real-time video conferencing technology  This service was conducted via  Video Visit        Chief Complaint  Cough (Wife is Covid positive), Headache, Generalized Body Aches, Sinusitis, and Fatigue    Subjective          Ted Saldivar presents to [facility]  History of Present Illness  HPI: Patient is following up on the blood pressure  The patient is taking the blood pressure medications as prescribed and has had no side effects. The patient also complains of nasal discharge , headache , fatigue ,  And sinus congestion since the past few days, patient has been trying over the counter medications with not much relief in the symptoms , patient also complains of  Dry cough   . He started his symptoms yesterday and has a fever since yesterday, his wife is Covid positive but he is due for a test, video visit today    During today's visit, I reviewed the documented allergies, medications, chief complaint, and pertinent vitals.  I have confirmed with the patient that there have been no changes since this information was discussed with my clinical team member.    Objective   Vital Signs:   Pulse 63   Temp 99.7 °F (37.6 °C)   SpO2 96%     Physical Exam   All vitals recorded within this visit are reported by the patient.  General appearance: Normocephalic and nontraumatic  HEENT: External inspection of eyes, ears and nose appears benign, hearing appears intact  Neck: Neck appears supple, trachea in midline, thyroid appears not  enlarged  Respiratory: Respiratory effort appears normal. cough  Musculoskeletal: Moving all limbs  Range of motion of visible joints appears within normal  CNS: No gross motor or sensory deficits  No gross cranial nerve deficits  No tremors  Psychiatry: Alert and oriented x3  Memory appears intact  Mood and affect appears normal  Insight appears normal    Result Review :                 Assessment and Plan    Diagnoses and all orders for this visit:    1. Benign essential hypertension (Primary)    2. Suspected 2019 novel coronavirus infection  -     COVID-19 PCR, [facility], NP SWAB IN LEXAR VIRAL TRANSPORT MEDIA/ORAL SWISH 24-30 HR TAT - Swab, Nasopharynx    3. Acute bronchitis due to other specified organisms  -     azithromycin (Zithromax Z-Sterling) 250 MG tablet; Take 2 tablets the first day, then 1 tablet daily for 4 days.  Dispense: 6 tablet; Refill: 0  -     albuterol sulfate  (90 Base) MCG/ACT inhaler; Inhale 1 puff Every 6 (Six) Hours As Needed for Wheezing.  Dispense: 18 g; Refill: 3    4. Acute recurrent maxillary sinusitis  -     azithromycin (Zithromax Z-Sterling) 250 MG tablet; Take 2 tablets the first day, then 1 tablet daily for 4 days.  Dispense: 6 tablet; Refill: 0    5. Acute laryngopharyngitis  -     azithromycin (Zithromax Z-Sterling) 250 MG tablet; Take 2 tablets the first day, then 1 tablet daily for 4 days.  Dispense: 6 tablet; Refill: 0     Plan :   1.Benign Essential Hypertension: will  continue current medications, low sodium diet, patient to continue to monitor  blood pressure  2 .acute bronchitis: We will start oral antibiotics , inhaler q4-6 hrs prn, will obtain Covid test  3. Acute maxillary sinusitis:   will   start oral antibiotics   4. Acute laryngopharyngitis:  will   Start  oral antibiotics  5.  Suspected Covid: We will obtain Covid test  I spent 30 minutes caring for Ted on this date of service. This time includes time spent by me in the following activities:preparing for the visit,  reviewing tests, performing a medically appropriate examination and/or evaluation , counseling and educating the patient/family/caregiver, ordering medications, tests, or procedures and documenting information in the medical record  Follow Up   This was a  video enabled telemedicine encounter.    Patient was given instructions and counseling regarding his condition or for health maintenance advice. Please see specific information pulled into the AVS if appropriate.

## 2022-02-07 ENCOUNTER — LAB (OUTPATIENT)
Dept: LAB | Facility: HOSPITAL | Age: 82
End: 2022-02-07

## 2022-02-07 LAB — SARS-COV-2 RNA NOSE QL NAA+PROBE: DETECTED

## 2022-02-07 PROCEDURE — C9803 HOPD COVID-19 SPEC COLLECT: HCPCS

## 2022-02-07 PROCEDURE — U0004 COV-19 TEST NON-CDC HGH THRU: HCPCS | Performed by: INTERNAL MEDICINE

## 2022-04-26 ENCOUNTER — OFFICE VISIT (OUTPATIENT)
Dept: INTERNAL MEDICINE | Facility: CLINIC | Age: 82
End: 2022-04-26

## 2022-04-26 VITALS
OXYGEN SATURATION: 98 % | HEART RATE: 58 BPM | DIASTOLIC BLOOD PRESSURE: 82 MMHG | HEIGHT: 70 IN | BODY MASS INDEX: 27.06 KG/M2 | SYSTOLIC BLOOD PRESSURE: 158 MMHG | WEIGHT: 189 LBS | TEMPERATURE: 97.5 F | RESPIRATION RATE: 16 BRPM

## 2022-04-26 DIAGNOSIS — I10 BENIGN ESSENTIAL HYPERTENSION: Primary | ICD-10-CM

## 2022-04-26 DIAGNOSIS — E78.2 MIXED HYPERLIPIDEMIA: ICD-10-CM

## 2022-04-26 DIAGNOSIS — M13.0 CHRONIC POLYARTHRITIS: ICD-10-CM

## 2022-04-26 LAB
ALBUMIN SERPL-MCNC: 4.2 G/DL (ref 3.5–5.2)
ALBUMIN/GLOB SERPL: 1.6 G/DL
ALP SERPL-CCNC: 86 U/L (ref 39–117)
ALT SERPL-CCNC: 17 U/L (ref 1–41)
AST SERPL-CCNC: 20 U/L (ref 1–40)
BASOPHILS # BLD AUTO: 0.09 10*3/MM3 (ref 0–0.2)
BASOPHILS NFR BLD AUTO: 1 % (ref 0–1.5)
BILIRUB SERPL-MCNC: 0.6 MG/DL (ref 0–1.2)
BUN SERPL-MCNC: 25 MG/DL (ref 8–23)
BUN/CREAT SERPL: 24.3 (ref 7–25)
CALCIUM SERPL-MCNC: 9.4 MG/DL (ref 8.6–10.5)
CHLORIDE SERPL-SCNC: 107 MMOL/L (ref 98–107)
CHOLEST SERPL-MCNC: 175 MG/DL (ref 0–200)
CO2 SERPL-SCNC: 25.2 MMOL/L (ref 22–29)
CREAT SERPL-MCNC: 1.03 MG/DL (ref 0.76–1.27)
EGFRCR SERPLBLD CKD-EPI 2021: 73.4 ML/MIN/1.73
EOSINOPHIL # BLD AUTO: 0.12 10*3/MM3 (ref 0–0.4)
EOSINOPHIL NFR BLD AUTO: 1.4 % (ref 0.3–6.2)
ERYTHROCYTE [DISTWIDTH] IN BLOOD BY AUTOMATED COUNT: 13.9 % (ref 12.3–15.4)
GLOBULIN SER CALC-MCNC: 2.6 GM/DL
GLUCOSE SERPL-MCNC: 95 MG/DL (ref 65–99)
HCT VFR BLD AUTO: 40.1 % (ref 37.5–51)
HDLC SERPL-MCNC: 55 MG/DL (ref 40–60)
HGB BLD-MCNC: 13.3 G/DL (ref 13–17.7)
IMM GRANULOCYTES # BLD AUTO: 0.04 10*3/MM3 (ref 0–0.05)
IMM GRANULOCYTES NFR BLD AUTO: 0.5 % (ref 0–0.5)
LDLC SERPL CALC-MCNC: 111 MG/DL (ref 0–100)
LYMPHOCYTES # BLD AUTO: 1.17 10*3/MM3 (ref 0.7–3.1)
LYMPHOCYTES NFR BLD AUTO: 13.6 % (ref 19.6–45.3)
MCH RBC QN AUTO: 32.7 PG (ref 26.6–33)
MCHC RBC AUTO-ENTMCNC: 33.2 G/DL (ref 31.5–35.7)
MCV RBC AUTO: 98.5 FL (ref 79–97)
MONOCYTES # BLD AUTO: 0.7 10*3/MM3 (ref 0.1–0.9)
MONOCYTES NFR BLD AUTO: 8.1 % (ref 5–12)
NEUTROPHILS # BLD AUTO: 6.49 10*3/MM3 (ref 1.7–7)
NEUTROPHILS NFR BLD AUTO: 75.4 % (ref 42.7–76)
NRBC BLD AUTO-RTO: 0.1 /100 WBC (ref 0–0.2)
PLATELET # BLD AUTO: 278 10*3/MM3 (ref 140–450)
POTASSIUM SERPL-SCNC: 5.2 MMOL/L (ref 3.5–5.2)
PROT SERPL-MCNC: 6.8 G/DL (ref 6–8.5)
RBC # BLD AUTO: 4.07 10*6/MM3 (ref 4.14–5.8)
SODIUM SERPL-SCNC: 141 MMOL/L (ref 136–145)
TRIGL SERPL-MCNC: 47 MG/DL (ref 0–150)
VLDLC SERPL CALC-MCNC: 9 MG/DL (ref 5–40)
WBC # BLD AUTO: 8.61 10*3/MM3 (ref 3.4–10.8)

## 2022-04-26 PROCEDURE — 99214 OFFICE O/P EST MOD 30 MIN: CPT | Performed by: INTERNAL MEDICINE

## 2022-04-26 RX ORDER — SIMVASTATIN 10 MG
10 TABLET ORAL
Qty: 90 TABLET | Refills: 1 | Status: SHIPPED | OUTPATIENT
Start: 2022-04-26 | End: 2022-09-15

## 2022-04-26 RX ORDER — LISINOPRIL 40 MG/1
40 TABLET ORAL DAILY
Qty: 90 TABLET | Refills: 1 | Status: SHIPPED | OUTPATIENT
Start: 2022-04-26 | End: 2022-09-15

## 2022-04-26 NOTE — PROGRESS NOTES
"Chief Complaint  Hypertension (fasting) and Hyperlipidemia    Subjective          Ted Saldivar presents to [facility]  History of Present Illness  HPI: Patient is here to follow up on the blood pressure  The patient is taking the blood pressure medications as prescribed and has had no side effects. The patient is also here to follow up on the cholesterol and is trying to follow a diet. The patient   is  due to get lab work done .  The patient also complains of arthritis for which she sees rheumatology, he needs refills on medications .   Hyperlipidemia   Pertinent negatives include no chest pain or shortness of breath.   Hypertension   Pertinent negatives include no chest pain, palpitations or shortness of breath.    Objective   Vital Signs:   /82   Pulse 58   Temp 97.5 °F (36.4 °C)   Resp 16   Ht 177.8 cm (70\")   Wt 85.7 kg (189 lb)   SpO2 98%   BMI 27.12 kg/m²     Physical Exam  Vitals and nursing note reviewed.   Constitutional:       General: He is not in acute distress.     Appearance: Normal appearance. He is not diaphoretic.   HENT:      Head: Normocephalic and atraumatic.      Right Ear: External ear normal.      Left Ear: External ear normal.      Nose: Nose normal.   Eyes:      Extraocular Movements: Extraocular movements intact.      Conjunctiva/sclera: Conjunctivae normal.   Neck:      Trachea: Trachea normal.   Cardiovascular:      Rate and Rhythm: Normal rate and regular rhythm.      Heart sounds: Normal heart sounds.   Pulmonary:      Effort: Pulmonary effort is normal. No respiratory distress.   Abdominal:      General: Abdomen is flat.   Musculoskeletal:      Cervical back: Neck supple.      Comments: Moves all limbs   Skin:     General: Skin is warm and dry.      Findings: No erythema.   Neurological:      Mental Status: He is alert and oriented to person, place, and time.      Comments: No gross motor or sensory deficits        Result Review :     Common " labs    Common Labsle 1/5/22 1/5/22 1/5/22 4/26/22 4/26/22 4/26/22    1042 1042 1042 1047 1047 1047   Glucose  93   95    BUN  23   25 (A)    Creatinine  1.01   1.03    eGFR Non  Am  71       eGFR African Am  86       Sodium  142   141    Potassium  5.0   5.2    Chloride  106   107    Calcium  9.5   9.4    Total Protein  6.6   6.8    Albumin  4.50   4.20    Total Bilirubin  0.5   0.6    Alkaline Phosphatase  82   86    AST (SGOT)  22   20    ALT (SGPT)  25   17    WBC 8.51   8.61     Hemoglobin 13.3   13.3     Hematocrit 39.0   40.1     Platelets 269   278     Total Cholesterol   186   175   Triglycerides   84   47   HDL Cholesterol   49   55   LDL Cholesterol    122 (A)   111 (A)   (A) Abnormal value       Comments are available for some flowsheets but are not being displayed.                     Assessment and Plan    Diagnoses and all orders for this visit:    1. Benign essential hypertension (Primary)  -     lisinopril (PRINIVIL,ZESTRIL) 40 MG tablet; Take 1 tablet by mouth Daily.  Dispense: 90 tablet; Refill: 1    2. Mixed hyperlipidemia  -     CBC & Differential  -     Comprehensive Metabolic Panel  -     Lipid Panel  -     simvastatin (ZOCOR) 10 MG tablet; Take 1 tablet by mouth every night at bedtime.  Dispense: 90 tablet; Refill: 1    3. Chronic polyarthritis    Plan:  1.  Benign essential hypertension: Will continue current medication, low-sodium diet advised, Counseled to regularly check BP at home with goal averaging <130/80.   2.mixed hyperlipidemia: will obtain   fasting CMP and lipid panel.  Diet and exercise counseled,  Will continue current medications  3.  Chronic polyarthritis: To continue medications per rheumatology       BMI is >= 25 and < 30. (Overweight) The following options were offered after discussion: weight loss educational material (shared in after visit summary), exercise counseling/recommendations and nutrition counseling/recommendations       I spent 30 minutes caring for  Ted on this date of service. This time includes time spent by me in the following activities:preparing for the visit, reviewing tests, performing a medically appropriate examination and/or evaluation , counseling and educating the patient/family/caregiver, ordering medications, tests, or procedures and documenting information in the medical record  Follow Up    Patient was given instructions and counseling regarding his condition or for health maintenance advice. Please see specific information pulled into the AVS if appropriate.

## 2022-09-13 DIAGNOSIS — E78.2 MIXED HYPERLIPIDEMIA: ICD-10-CM

## 2022-09-13 DIAGNOSIS — I10 BENIGN ESSENTIAL HYPERTENSION: ICD-10-CM

## 2022-09-15 RX ORDER — SIMVASTATIN 10 MG
10 TABLET ORAL
Qty: 90 TABLET | Refills: 0 | Status: SHIPPED | OUTPATIENT
Start: 2022-09-15 | End: 2022-12-08

## 2022-09-15 RX ORDER — LISINOPRIL 40 MG/1
40 TABLET ORAL DAILY
Qty: 90 TABLET | Refills: 0 | Status: SHIPPED | OUTPATIENT
Start: 2022-09-15 | End: 2022-12-08

## 2022-09-15 NOTE — TELEPHONE ENCOUNTER
Rx Refill Note  Requested Prescriptions     Pending Prescriptions Disp Refills   • simvastatin (ZOCOR) 10 MG tablet [Pharmacy Med Name: Simvastatin 10 MG Oral Tablet] 90 tablet 3     Sig: TAKE 1 TABLET BY MOUTH  EVERY NIGHT AT BEDTIME   • lisinopril (PRINIVIL,ZESTRIL) 40 MG tablet [Pharmacy Med Name: Lisinopril 40 MG Oral Tablet] 90 tablet 3     Sig: TAKE 1 TABLET BY MOUTH  DAILY      Last office visit with prescribing clinician: 4/26/2022      Next office visit with prescribing clinician: 11/16/2022            Wendy Linton LPN  09/15/22, 15:32 EDT

## 2022-12-08 DIAGNOSIS — I10 BENIGN ESSENTIAL HYPERTENSION: ICD-10-CM

## 2022-12-08 DIAGNOSIS — E78.2 MIXED HYPERLIPIDEMIA: ICD-10-CM

## 2022-12-08 RX ORDER — SIMVASTATIN 10 MG
10 TABLET ORAL
Qty: 90 TABLET | Refills: 3 | Status: SHIPPED | OUTPATIENT
Start: 2022-12-08 | End: 2022-12-28 | Stop reason: SDUPTHER

## 2022-12-08 RX ORDER — LISINOPRIL 40 MG/1
40 TABLET ORAL DAILY
Qty: 90 TABLET | Refills: 3 | Status: SHIPPED | OUTPATIENT
Start: 2022-12-08 | End: 2022-12-28

## 2022-12-19 ENCOUNTER — TRANSCRIBE ORDERS (OUTPATIENT)
Dept: GENERAL RADIOLOGY | Facility: HOSPITAL | Age: 82
End: 2022-12-19

## 2022-12-19 ENCOUNTER — HOSPITAL ENCOUNTER (OUTPATIENT)
Dept: GENERAL RADIOLOGY | Facility: HOSPITAL | Age: 82
Discharge: HOME | End: 2022-12-19
Admitting: NURSE PRACTITIONER

## 2022-12-19 DIAGNOSIS — Z79.899 OTHER LONG TERM (CURRENT) DRUG THERAPY: ICD-10-CM

## 2022-12-19 DIAGNOSIS — M06.9 RHEUMATOID ARTHRITIS, INVOLVING UNSPECIFIED SITE, UNSPECIFIED WHETHER RHEUMATOID FACTOR PRESENT: Primary | ICD-10-CM

## 2022-12-19 DIAGNOSIS — M15.9 OSTEOARTHRITIS OF MULTIPLE JOINTS, UNSPECIFIED OSTEOARTHRITIS TYPE: ICD-10-CM

## 2022-12-19 DIAGNOSIS — M06.9 RHEUMATOID ARTHRITIS, INVOLVING UNSPECIFIED SITE, UNSPECIFIED WHETHER RHEUMATOID FACTOR PRESENT: ICD-10-CM

## 2022-12-19 PROCEDURE — 71046 X-RAY EXAM CHEST 2 VIEWS: CPT

## 2022-12-28 ENCOUNTER — OFFICE VISIT (OUTPATIENT)
Dept: INTERNAL MEDICINE | Facility: CLINIC | Age: 82
End: 2022-12-28
Payer: MEDICARE

## 2022-12-28 VITALS
TEMPERATURE: 98.8 F | HEIGHT: 70 IN | SYSTOLIC BLOOD PRESSURE: 124 MMHG | HEART RATE: 70 BPM | OXYGEN SATURATION: 98 % | RESPIRATION RATE: 16 BRPM | DIASTOLIC BLOOD PRESSURE: 62 MMHG | WEIGHT: 192 LBS | BODY MASS INDEX: 27.49 KG/M2

## 2022-12-28 DIAGNOSIS — E78.2 MIXED HYPERLIPIDEMIA: ICD-10-CM

## 2022-12-28 DIAGNOSIS — Z00.00 MEDICARE ANNUAL WELLNESS VISIT, SUBSEQUENT: Primary | ICD-10-CM

## 2022-12-28 DIAGNOSIS — I10 BENIGN ESSENTIAL HYPERTENSION: ICD-10-CM

## 2022-12-28 PROCEDURE — G0439 PPPS, SUBSEQ VISIT: HCPCS | Performed by: INTERNAL MEDICINE

## 2022-12-28 PROCEDURE — 99397 PER PM REEVAL EST PAT 65+ YR: CPT | Performed by: INTERNAL MEDICINE

## 2022-12-28 PROCEDURE — 1159F MED LIST DOCD IN RCRD: CPT | Performed by: INTERNAL MEDICINE

## 2022-12-28 PROCEDURE — 1170F FXNL STATUS ASSESSED: CPT | Performed by: INTERNAL MEDICINE

## 2022-12-28 RX ORDER — LOSARTAN POTASSIUM 50 MG/1
50 TABLET ORAL DAILY
Qty: 90 TABLET | Refills: 1 | Status: SHIPPED | OUTPATIENT
Start: 2022-12-28 | End: 2023-02-01 | Stop reason: SDUPTHER

## 2022-12-28 RX ORDER — SIMVASTATIN 10 MG
10 TABLET ORAL
Qty: 90 TABLET | Refills: 1 | Status: SHIPPED | OUTPATIENT
Start: 2022-12-28 | End: 2023-02-01 | Stop reason: SDUPTHER

## 2022-12-28 NOTE — PATIENT INSTRUCTIONS
Advance Care Planning and Advance Directives     You make decisions on a daily basis - decisions about where you want to live, your career, your home, your life. Perhaps one of the most important decisions you face is your choice for future medical care. Take time to talk with your family and your healthcare team and start planning today.  Advance Care Planning is a process that can help you:  · Understand possible future healthcare decisions in light of your own experiences  · Reflect on those decision in light of your goals and values  · Discuss your decisions with those closest to you and the healthcare professionals that care for you  · Make a plan by creating a document that reflects your wishes    Surrogate Decision Maker  In the event of a medical emergency, which has left you unable to communicate or to make your own decisions, you would need someone to make decisions for you.  It is important to discuss your preferences for medical treatment with this person while you are in good health.     Qualities of a surrogate decision maker:  • Willing to take on this role and responsibility  • Knows what you want for future medical care  • Willing to follow your wishes even if they don't agree with them  • Able to make difficult medical decisions under stressful circumstances    Advance Directives  These are legal documents you can create that will guide your healthcare team and decision maker(s) when needed. These documents can be stored in the electronic medical record.    · Living Will - a legal document to guide your care if you have a terminal condition or a serious illness and are unable to communicate. States vary by statute in document names/types, but most forms may include one or more of the following:        -  Directions regarding life-prolonging treatments        -  Directions regarding artificially provided nutrition/hydration        -  Choosing a healthcare decision maker        -  Direction  regarding organ/tissue donation    · Durable Power of  for Healthcare - this document names an -in-fact to make medical decisions for you, but it may also allow this person to make personal and financial decisions for you. Please seek the advice of an  if you need this type of document.    **Advance Directives are not required and no one may discriminate against you if you do not sign one.    Medical Orders  Many states allow specific forms/orders signed by your physician to record your wishes for medical treatment in your current state of health. This form, signed in personal communication with your physician, addresses resuscitation and other medical interventions that you may or may not want.      For more information or to schedule a time with a [facility] Advance Care Planning Facilitator contact: [facility] or call [phone] and someone will contact you directly.  You are due for Shingrix vaccination series ( the newest shingles vaccine).  It is a two shot series spaced 2-6 months apart. Please get this vaccine series started at your earliest convenience at your local pharmacy to help avoid shingles outbreak. It is more effective than the old Zostavax vaccine and is recommended even if you have had the Zostavax vaccine in the past.  Once the Shingrix series is completed, it does not need to be repeated.   For more information, please look at the website below:  [facility] Shingrix Vaccine Information      Medicare Wellness  Personal Prevention Plan of Service     Date of Office Visit:    Encounter Provider:  Sheila Natarajan MD  Place of Service:  [facility]  Patient Name: Ted Saldivar  :  1940    As part of the Medicare Wellness portion of your visit today, we are providing you with this personalized preventive plan of services (PPPS). This plan is based upon recommendations of the [place] Preventive Services Task Force (USPSTF) and  the Advisory Committee on Immunization Practices (ACIP).    This lists the preventive care services that should be considered, and provides dates of when you are due. Items listed as completed are up-to-date and do not require any further intervention.    Health Maintenance   Topic Date Due   • ZOSTER VACCINE (2 of 2) 11/26/2016   • ANNUAL WELLNESS VISIT  12/04/2020   • LIPID PANEL  04/26/2023   • TDAP/TD VACCINES (2 - Td or Tdap) 07/27/2029   • COVID-19 Vaccine  Completed   • INFLUENZA VACCINE  Completed   • Pneumococcal Vaccine 65+  Completed       No orders of the defined types were placed in this encounter.      No follow-ups on file.

## 2022-12-28 NOTE — PROGRESS NOTES
The ABCs of the Annual Wellness Visit  Subsequent Medicare Wellness Visit    Chief Complaint   Patient presents with   • Medicare Wellness-subsequent         • Hypertension   • Hyperlipidemia       Subjective       Ted Saldivar is a 82 y.o. male who presents for a Subsequent Medicare Wellness Visit and physical , Patient is here to follow up on the blood pressure  The patient is taking the blood pressure medications as prescribed and has had no side effects. The patient is also here to follow up on the cholesterol and is trying to follow a diet. The patient is  due to get lab work done .  The patient also needs refills on medications .   Hyperlipidemia   Pertinent negatives include no chest pain or shortness of breath.   Hypertension   Pertinent negatives include no chest pain, palpitations or shortness of breath.      The following portions of the patient's history were reviewed and   updated as appropriate: allergies, current medications, past family history, past medical history, past social history, past surgical history and problem list.    Compared to one year ago, the patient feels his physical   health is worse.    Compared to one year ago, the patient feels his mental   health is the same.    Recent Hospitalizations:  He was not admitted to the hospital during the last year.       Current Medical Providers:  Patient Care Team:  Sheila Natarajan MD as PCP - General (Internal Medicine)    Outpatient Medications Prior to Visit   Medication Sig Dispense Refill   • albuterol sulfate  (90 Base) MCG/ACT inhaler Inhale 1 puff Every 6 (Six) Hours As Needed for Wheezing. 18 g 3   • folic acid (FOLVITE) 1 MG tablet Take 1 tablet by mouth Daily. 90 tablet 1   • meclizine (ANTIVERT) 25 MG tablet Take 1 tablet by mouth 3 (Three) Times a Day As Needed for dizziness. (Patient taking differently: Take 25 mg by mouth As Needed for Dizziness.) 90 tablet 1   • methotrexate 2.5 MG tablet Take 8 tablets by mouth 1 (One)  Time Per Week. On thursday     • lisinopril (PRINIVIL,ZESTRIL) 40 MG tablet TAKE 1 TABLET BY MOUTH  DAILY 90 tablet 3   • simvastatin (ZOCOR) 10 MG tablet TAKE 1 TABLET BY MOUTH  EVERY NIGHT AT BEDTIME 90 tablet 3   • sildenafil (Viagra) 100 MG tablet Take 1 tablet by mouth At Night As Needed for Erectile Dysfunction. 10 tablet 3   • hydroxychloroquine (PLAQUENIL) 200 MG tablet      • methylPREDNISolone (MEDROL) 4 MG dose pack Take as directed on package instructions. 21 each 0     No facility-administered medications prior to visit.       No opioid medication identified on active medication list. I have reviewed chart for other potential  high risk medication/s and harmful drug interactions in the elderly.          Aspirin is not on active medication list.  Aspirin use is not indicated based on review of current medical condition/s. Risk of harm outweighs potential benefits.  .    Patient Active Problem List   Diagnosis   • Neoplasm of skin   • Generalized abdominal pain   • Acute infective gastroenteritis   • Essential hypertension   • Dyslipidemia   • Nuclear sclerotic cataract of right eye     Advance Care Planning  Advance Directive is not on file.  ACP discussion was held with the patient during this visit. Patient has an advance directive (not in EMR), copy requested.     Objective    Vitals:    12/28/22 1555   BP: 124/62   Pulse: 70   Resp: 16   Temp: 98.8 °F (37.1 °C)   TempSrc: Temporal   SpO2: 98%   Weight: 87.1 kg (192 lb)   Height: 177.8 cm (70\")   PainSc: 0-No pain     Estimated body mass index is 27.55 kg/m² as calculated from the following:    Height as of this encounter: 177.8 cm (70\").    Weight as of this encounter: 87.1 kg (192 lb).    BMI is >= 25 and <30. (Overweight) The following options were offered after discussion;: weight loss educational material (shared in after visit summary), exercise counseling/recommendations and nutrition counseling/recommendations    All vitals recorded within  this visit are reported by the patient.  General appearance: Normocephalic and nontraumatic  HEENT: External inspection of eyes, ears and nose appears benign, hearing appears intact  Neck: Neck appears supple, trachea in midline, thyroid appears not enlarged  Respiratory: Respiratory effort appears normal  Musculoskeletal: Moving all limbs  Range of motion of visible joints appears within normal  CNS: No gross motor or sensory deficits  No gross cranial nerve deficits  No tremors  Psychiatry: Alert and oriented x3  Memory appears intact  Mood and affect appears normal  Insight appears normal      Does the patient have evidence of cognitive impairment?   No            HEALTH RISK ASSESSMENT    Smoking Status:  Social History     Tobacco Use   Smoking Status Never   Smokeless Tobacco Never     Alcohol Consumption:  Social History     Substance and Sexual Activity   Alcohol Use Yes    Comment: SOCIALLY     Fall Risk Screen:    [facility] Fall Risk Assessment has not been completed.    Depression Screening:  PHQ-2/PHQ-9 Depression Screening 12/28/2022   Little Interest or Pleasure in Doing Things 0-->not at all   Feeling Down, Depressed or Hopeless 3-->nearly every day   Trouble Falling or Staying Asleep, or Sleeping Too Much 0-->not at all   Feeling Tired or Having Little Energy 3-->nearly every day   Poor Appetite or Overeating 0-->not at all   Feeling Bad about Yourself - or that You are a Failure or Have Let Yourself or Your Family Down 3-->nearly every day   Trouble Concentrating on Things, Such as Reading the Newspaper or Watching Television 0-->not at all   Moving or Speaking So Slowly that Other People Could Have Noticed? Or the Opposite - Being So Fidgety 0-->not at all   Thoughts that You Would be Better Off Dead or of Hurting Yourself in Some Way 0-->not at all   PHQ-9: Brief Depression Severity Measure Score 9   If You Checked Off Any Problems, How Difficult Have These Problems Made It For You to Do Your Work,  Take Care of Things at Home, or Get Along with Other People? extremely difficult       Health Habits and Functional and Cognitive Screening:  Functional & Cognitive Status 12/28/2022   Do you have difficulty preparing food and eating? No   Do you have difficulty bathing yourself, getting dressed or grooming yourself? Yes   Do you have difficulty using the toilet? No   Do you have difficulty moving around from place to place? No   Do you have trouble with steps or getting out of a bed or a chair? Yes   Current Diet Well Balanced Diet        Current Diet Comment wife states that he doesn't eat enough fruits and vegetables   Dental Exam Up to date   Eye Exam Up to date   Exercise (times per week) 0 times per week   Current Exercises Include No Regular Exercise   Current Exercise Activities Include -   Do you need help using the phone?  No   Are you deaf or do you have serious difficulty hearing?  No   Do you need help with transportation? No   Do you need help shopping? No   Do you need help preparing meals?  No   Do you need help with housework?  No   Do you need help with laundry? No   Do you need help taking your medications? No   Do you need help managing money? No   Do you ever drive or ride in a car without wearing a seat belt? No   Have you felt unusual stress, anger or loneliness in the last month? Yes   Who do you live with? Spouse   If you need help, do you have trouble finding someone available to you? No   Have you been bothered in the last four weeks by sexual problems? No   Do you have difficulty concentrating, remembering or making decisions? No       Age-appropriate Screening Schedule:  Refer to the list below for future screening recommendations based on patient's age, sex and/or medical conditions. Orders for these recommended tests are listed in the plan section. The patient has been provided with a written plan.    Health Maintenance   Topic Date Due   • ZOSTER VACCINE (2 of 2) 11/26/2016   • LIPID  PANEL  04/26/2023   • TDAP/TD VACCINES (2 - Td or Tdap) 07/27/2029   • INFLUENZA VACCINE  Completed                CMS Preventative Services Quick Reference  Risk Factors Identified During Encounter:    None Identified    The above risks/problems have been discussed with the patient.  Pertinent information has been shared with the patient in the After Visit Summary.    Diagnoses and all orders for this visit:    1. Medicare annual wellness visit, subsequent (Primary)    2. Benign essential hypertension  -     losartan (Cozaar) 50 MG tablet; Take 1 tablet by mouth Daily.  Dispense: 90 tablet; Refill: 1    3. Mixed hyperlipidemia  -     simvastatin (ZOCOR) 10 MG tablet; Take 1 tablet by mouth every night at bedtime.  Dispense: 90 tablet; Refill: 1  -     CBC & Differential  -     Comprehensive Metabolic Panel  -     Lipid Panel      Plan:  1.physical: Physical exam conducted today, reviewed fasting lab work,   Impression: healthy adult Patient. Currently, patient eats a healthy diet. Screening lab work includes glucose, lipid profile and complete blood count . The risks and benefits of immunizations were discussed and immunizations are up to date. Advice and education were given regarding nutrition and aerobic exercise. Patient discussion: discussed with the patient.  Annual Wellness Visit,physical   with preventive exam as well as age and risk appropriate counseling completed.   Advance Directive Planning: paperwork and instructions were given to the patient.   Patient Discussion: plan discussed with the patient, follow-up visit needed in one year. Medication Review and medication list updated  2.  Benign essential hypertension: Will continue current medication, low-sodium diet advised, Counseled to regularly check BP at home with goal averaging <130/80.   3.mixed hyperlipidemia: will obtain   fasting CMP and lipid panel.  Diet and exercise counseled,  Will continue current medications       Follow Up:   Next  Medicare Wellness visit to be scheduled in 1 year.      An After Visit Summary and PPPS were made available to the patient.

## 2023-01-05 ENCOUNTER — TELEPHONE (OUTPATIENT)
Dept: INTERNAL MEDICINE | Facility: CLINIC | Age: 83
End: 2023-01-05
Payer: MEDICARE

## 2023-01-05 NOTE — TELEPHONE ENCOUNTER
Pharmacy states they rec'd a rx for lisinopril on 12-26-22 and then a rx for losartan 2 days later.  Wants to verify if patient is to be on one or the other or both.  Please advise.

## 2023-01-05 NOTE — TELEPHONE ENCOUNTER
Caller: LAYA DU    Relationship: Emergency Contact    Best call back number: [phone]     Who are you requesting to speak with (clinical staff, provider,  specific staff member): CLINICAL    What was the call regarding: THE PATIENT'S SPOUSE STATES THAT [facility] HAVE BEEN TRYING TO REACH OUT TO THE OFFICE IN REGARDS TO THE losartan (Cozaar) 50 MG tablet PRESCRIPTION.    Do you require a callback: IF NEEDED.    THANK YOU.

## 2023-01-24 ENCOUNTER — OFFICE VISIT (OUTPATIENT)
Dept: INTERNAL MEDICINE | Facility: CLINIC | Age: 83
End: 2023-01-24
Payer: MEDICARE

## 2023-01-24 ENCOUNTER — HOSPITAL ENCOUNTER (OUTPATIENT)
Dept: GENERAL RADIOLOGY | Facility: HOSPITAL | Age: 83
Discharge: HOME | End: 2023-01-24
Admitting: NURSE PRACTITIONER
Payer: MEDICARE

## 2023-01-24 VITALS
DIASTOLIC BLOOD PRESSURE: 60 MMHG | OXYGEN SATURATION: 100 % | HEART RATE: 64 BPM | SYSTOLIC BLOOD PRESSURE: 118 MMHG | HEIGHT: 70 IN | TEMPERATURE: 98.6 F | WEIGHT: 190 LBS | BODY MASS INDEX: 27.2 KG/M2

## 2023-01-24 DIAGNOSIS — M25.551 ACUTE HIP PAIN, RIGHT: Primary | ICD-10-CM

## 2023-01-24 PROCEDURE — 99213 OFFICE O/P EST LOW 20 MIN: CPT | Performed by: NURSE PRACTITIONER

## 2023-01-24 PROCEDURE — 73502 X-RAY EXAM HIP UNI 2-3 VIEWS: CPT

## 2023-01-24 RX ORDER — MELOXICAM 15 MG/1
1 TABLET ORAL DAILY
COMMUNITY
Start: 2022-12-16 | End: 2023-01-24

## 2023-01-24 RX ORDER — FOLIC ACID 1 MG/1
1 TABLET ORAL DAILY
COMMUNITY
Start: 2022-12-16 | End: 2023-01-24 | Stop reason: SDUPTHER

## 2023-01-24 NOTE — PROGRESS NOTES
"Chief Complaint   Patient presents with   • Hip Pain     R hip, patient states that he had severe pain, rated at 8/10 last night, pain extends down leg     Subjective   Ted Saldivar is a 82 y.o. male.     History of Present Illness     Patient presents for right hip pain.  Last night was hurting 8/10.  Pain refers down leg.  He had been sitting for prolonged periods at [facility] the day before symptoms started.  History of [place] 5 [place]. Was told that his right hip was worse but on imaging but it was not bothering him at the time so they did surgery on left. Wife gave him Tylenol arthritis and this has helped with the pain.  States it is currently a 2-3 out of 10.  Tylenol manages the pain well.  Uses cane for ambulation.  Denies fever, myalgia, chills, erythema of joint, or swelling.     The following portions of the patient's history were reviewed and updated as appropriate: allergies, current medications, past family history, past medical history, past social history, past surgical history and problem list.    Review of Systems   Musculoskeletal: Positive for arthralgias and gait problem. Negative for joint swelling.       Objective   /60   Pulse 64   Temp 98.6 °F (37 °C) (Temporal)   Ht 177.8 cm (70\")   Wt 86.2 kg (190 lb)   SpO2 100%   BMI 27.26 kg/m²   Body mass index is 27.26 kg/m².  Physical Exam  Vitals and nursing note reviewed.   Constitutional:       Appearance: Normal appearance.   HENT:      Head: Normocephalic and atraumatic.   Eyes:      Extraocular Movements: Extraocular movements intact.   Cardiovascular:      Rate and Rhythm: Normal rate.   Pulmonary:      Effort: Pulmonary effort is normal.   Musculoskeletal:      Cervical back: Normal range of motion.      Right hip: Tenderness present. Decreased range of motion. Decreased strength.      Comments: Using cane for ambulation   Neurological:      General: No focal deficit " present.      Mental Status: He is alert and oriented to person, place, and time.   Psychiatric:         Mood and Affect: Mood normal.         Behavior: Behavior normal.         Assessment & Plan   Ted Saldivar is here today and the following problems have been addressed:      Diagnoses and all orders for this visit:    1. Acute hip pain, right (Primary)  -     XR Hip With or Without Pelvis 2 - 3 View Right    Recommend rest, heat, over-the-counter treatments such as Voltaren gel and Tylenol arthritis.  Tylenol has been helping with pain and managing it well 2-3/10.   Will start with xray image, likely will need referral to orthopedic surgery.    Follow Up   Return if symptoms worsen or fail to improve.  Patient was given instructions and counseling regarding his condition or for health maintenance advice. Please see specific information pulled into the AVS if appropriate.     Trinh WARD  [facility] [place]

## 2023-01-26 NOTE — PROGRESS NOTES
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

## 2023-02-01 ENCOUNTER — OFFICE VISIT (OUTPATIENT)
Dept: INTERNAL MEDICINE | Facility: CLINIC | Age: 83
End: 2023-02-01
Payer: MEDICARE

## 2023-02-01 VITALS
HEIGHT: 70 IN | SYSTOLIC BLOOD PRESSURE: 158 MMHG | BODY MASS INDEX: 27.49 KG/M2 | HEART RATE: 65 BPM | RESPIRATION RATE: 16 BRPM | OXYGEN SATURATION: 99 % | TEMPERATURE: 98.2 F | WEIGHT: 192 LBS | DIASTOLIC BLOOD PRESSURE: 82 MMHG

## 2023-02-01 DIAGNOSIS — I10 BENIGN ESSENTIAL HYPERTENSION: Primary | ICD-10-CM

## 2023-02-01 DIAGNOSIS — E78.2 MIXED HYPERLIPIDEMIA: ICD-10-CM

## 2023-02-01 DIAGNOSIS — M25.551 PAIN IN JOINT OF RIGHT HIP: ICD-10-CM

## 2023-02-01 LAB
ALBUMIN SERPL-MCNC: 4.3 G/DL (ref 3.5–5.2)
ALBUMIN/GLOB SERPL: 1.8 G/DL
ALP SERPL-CCNC: 80 U/L (ref 39–117)
ALT SERPL-CCNC: 13 U/L (ref 1–41)
AST SERPL-CCNC: 18 U/L (ref 1–40)
BASOPHILS # BLD AUTO: 0.11 10*3/MM3 (ref 0–0.2)
BASOPHILS NFR BLD AUTO: 0.8 % (ref 0–1.5)
BILIRUB SERPL-MCNC: 0.7 MG/DL (ref 0–1.2)
BUN SERPL-MCNC: 22 MG/DL (ref 8–23)
BUN/CREAT SERPL: 19.6 (ref 7–25)
CALCIUM SERPL-MCNC: 10 MG/DL (ref 8.6–10.5)
CHLORIDE SERPL-SCNC: 105 MMOL/L (ref 98–107)
CHOLEST SERPL-MCNC: 211 MG/DL (ref 0–200)
CO2 SERPL-SCNC: 28.6 MMOL/L (ref 22–29)
CREAT SERPL-MCNC: 1.12 MG/DL (ref 0.76–1.27)
EGFRCR SERPLBLD CKD-EPI 2021: 66 ML/MIN/1.73
EOSINOPHIL # BLD AUTO: 0.13 10*3/MM3 (ref 0–0.4)
EOSINOPHIL NFR BLD AUTO: 0.9 % (ref 0.3–6.2)
ERYTHROCYTE [DISTWIDTH] IN BLOOD BY AUTOMATED COUNT: 13.7 % (ref 12.3–15.4)
GLOBULIN SER CALC-MCNC: 2.4 GM/DL
GLUCOSE SERPL-MCNC: 97 MG/DL (ref 65–99)
HCT VFR BLD AUTO: 38.6 % (ref 37.5–51)
HDLC SERPL-MCNC: 51 MG/DL (ref 40–60)
HGB BLD-MCNC: 13.1 G/DL (ref 13–17.7)
IMM GRANULOCYTES # BLD AUTO: 0.06 10*3/MM3 (ref 0–0.05)
IMM GRANULOCYTES NFR BLD AUTO: 0.4 % (ref 0–0.5)
LDLC SERPL CALC-MCNC: 146 MG/DL (ref 0–100)
LYMPHOCYTES # BLD AUTO: 0.9 10*3/MM3 (ref 0.7–3.1)
LYMPHOCYTES NFR BLD AUTO: 6.5 % (ref 19.6–45.3)
MCH RBC QN AUTO: 32.2 PG (ref 26.6–33)
MCHC RBC AUTO-ENTMCNC: 33.9 G/DL (ref 31.5–35.7)
MCV RBC AUTO: 94.8 FL (ref 79–97)
MONOCYTES # BLD AUTO: 0.89 10*3/MM3 (ref 0.1–0.9)
MONOCYTES NFR BLD AUTO: 6.4 % (ref 5–12)
NEUTROPHILS # BLD AUTO: 11.8 10*3/MM3 (ref 1.7–7)
NEUTROPHILS NFR BLD AUTO: 85 % (ref 42.7–76)
NRBC BLD AUTO-RTO: 0 /100 WBC (ref 0–0.2)
PLATELET # BLD AUTO: 287 10*3/MM3 (ref 140–450)
POTASSIUM SERPL-SCNC: 5.4 MMOL/L (ref 3.5–5.2)
PROT SERPL-MCNC: 6.7 G/DL (ref 6–8.5)
RBC # BLD AUTO: 4.07 10*6/MM3 (ref 4.14–5.8)
SODIUM SERPL-SCNC: 143 MMOL/L (ref 136–145)
TRIGL SERPL-MCNC: 79 MG/DL (ref 0–150)
VLDLC SERPL CALC-MCNC: 14 MG/DL (ref 5–40)
WBC # BLD AUTO: 13.89 10*3/MM3 (ref 3.4–10.8)

## 2023-02-01 PROCEDURE — 99214 OFFICE O/P EST MOD 30 MIN: CPT | Performed by: INTERNAL MEDICINE

## 2023-02-01 RX ORDER — LOSARTAN POTASSIUM 50 MG/1
50 TABLET ORAL DAILY
Qty: 90 TABLET | Refills: 1 | Status: ON HOLD | OUTPATIENT
Start: 2023-02-01 | End: 2023-03-03 | Stop reason: SDUPTHER

## 2023-02-01 RX ORDER — SIMVASTATIN 10 MG
10 TABLET ORAL
Qty: 90 TABLET | Refills: 1 | Status: SHIPPED | OUTPATIENT
Start: 2023-02-01

## 2023-02-01 NOTE — PROGRESS NOTES
"Chief Complaint  Hypertension and Hyperlipidemia    Subjective        Ted Saldivar presents to [facility]  History of Present Illness  HPI: Patient is here to follow up on the blood pressure  The patient is taking the blood pressure medications as prescribed and has had no side effects. The patient is also here to follow up on the cholesterol and is trying to follow a diet. The patient  is  due to get lab work done .  The patient also needs refills on medications .  Patient also complains of chronic pain in right hip joint for several years and it is worsening  Hyperlipidemia   Pertinent negatives include no chest pain or shortness of breath.   Hypertension   Pertinent negatives include no chest pain, palpitations or shortness of breath.    Objective   Vital Signs:  /82   Pulse 65   Temp 98.2 °F (36.8 °C)   Resp 16   Ht 177.8 cm (70\")   Wt 87.1 kg (192 lb)   SpO2 99%   BMI 27.55 kg/m²   Estimated body mass index is 27.55 kg/m² as calculated from the following:    Height as of this encounter: 177.8 cm (70\").    Weight as of this encounter: 87.1 kg (192 lb).       BMI is >= 25 and <30. (Overweight) The following options were offered after discussion;: weight loss educational material (shared in after visit summary), exercise counseling/recommendations and nutrition counseling/recommendations      Physical Exam  Vitals and nursing note reviewed.   Constitutional:       General: He is not in acute distress.     Appearance: Normal appearance. He is not diaphoretic.   HENT:      Head: Normocephalic and atraumatic.      Right Ear: External ear normal.      Left Ear: External ear normal.      Nose: Nose normal.   Eyes:      Extraocular Movements: Extraocular movements intact.      Conjunctiva/sclera: Conjunctivae normal.   Neck:      Trachea: Trachea normal.   Cardiovascular:      Rate and Rhythm: Normal rate and regular rhythm.      Heart sounds: Normal heart sounds.   Pulmonary: "      Effort: Pulmonary effort is normal. No respiratory distress.   Abdominal:      General: Abdomen is flat.   Musculoskeletal:         General: Deformity present.      Cervical back: Neck supple.      Comments: Moves all limbs   Skin:     General: Skin is warm and dry.      Findings: No erythema.   Neurological:      Mental Status: He is alert and oriented to person, place, and time.      Comments: No gross motor or sensory deficits        Result Review :    Common labs    Common Labs 4/26/22 4/26/22 4/26/22    1047 1047 1047   Glucose  95    BUN  25 (A)    Creatinine  1.03    Sodium  141    Potassium  5.2    Chloride  107    Calcium  9.4    Total Protein  6.8    Albumin  4.20    Total Bilirubin  0.6    Alkaline Phosphatase  86    AST (SGOT)  20    ALT (SGPT)  17    WBC 8.61     Hemoglobin 13.3     Hematocrit 40.1     Platelets 278     Total Cholesterol   175   Triglycerides   47   HDL Cholesterol   55   LDL Cholesterol    111 (A)   (A) Abnormal value       Comments are available for some flowsheets but are not being displayed.             XR Hip With or Without Pelvis 2 - 3 View Right (01/24/2023 12:08)             Assessment and Plan   Diagnoses and all orders for this visit:    1. Benign essential hypertension (Primary)  -     losartan (Cozaar) 50 MG tablet; Take 1 tablet by mouth Daily.  Dispense: 90 tablet; Refill: 1    2. Mixed hyperlipidemia  -     simvastatin (ZOCOR) 10 MG tablet; Take 1 tablet by mouth every night at bedtime.  Dispense: 90 tablet; Refill: 1    3. Pain in joint of right hip  -     Ambulatory Referral to Orthopedic Surgery    Plan:  1.  Benign essential hypertension: Will continue current medication, low-sodium diet advised, Counseled to regularly check BP at home with goal averaging <130/80.   2.mixed hyperlipidemia: will obtain   fasting CMP and lipid panel.  Diet and exercise counseled,  Will continue current medications  3.  Pain in right hip joint: We will refer patient to  orthopedist       I spent 30 minutes caring for Ted on this date of service. This time includes time spent by me in the following activities:preparing for the visit, reviewing tests, performing a medically appropriate examination and/or evaluation , counseling and educating the patient/family/caregiver, ordering medications, tests, or procedures and documenting information in the medical record  Follow Up   Return in about 4 months (around 6/12/2023).  Patient was given instructions and counseling regarding his condition or for health maintenance advice. Please see specific information pulled into the AVS if appropriate.

## 2023-02-08 ENCOUNTER — OFFICE VISIT (OUTPATIENT)
Dept: INTERNAL MEDICINE | Facility: CLINIC | Age: 83
End: 2023-02-08
Payer: MEDICARE

## 2023-02-08 VITALS
BODY MASS INDEX: 27.2 KG/M2 | TEMPERATURE: 98.4 F | WEIGHT: 190 LBS | HEART RATE: 65 BPM | OXYGEN SATURATION: 100 % | SYSTOLIC BLOOD PRESSURE: 143 MMHG | DIASTOLIC BLOOD PRESSURE: 82 MMHG | HEIGHT: 70 IN | RESPIRATION RATE: 16 BRPM

## 2023-02-08 DIAGNOSIS — R05.9 COUGH, UNSPECIFIED TYPE: ICD-10-CM

## 2023-02-08 DIAGNOSIS — I10 BENIGN ESSENTIAL HYPERTENSION: Primary | ICD-10-CM

## 2023-02-08 DIAGNOSIS — E78.2 MIXED HYPERLIPIDEMIA: ICD-10-CM

## 2023-02-08 DIAGNOSIS — J01.01 ACUTE RECURRENT MAXILLARY SINUSITIS: ICD-10-CM

## 2023-02-08 LAB
EXPIRATION DATE: NORMAL
FLUAV AG UPPER RESP QL IA.RAPID: NOT DETECTED
FLUBV AG UPPER RESP QL IA.RAPID: NOT DETECTED
INTERNAL CONTROL: NORMAL
Lab: NORMAL
SARS-COV-2 AG UPPER RESP QL IA.RAPID: NOT DETECTED

## 2023-02-08 PROCEDURE — 99214 OFFICE O/P EST MOD 30 MIN: CPT | Performed by: INTERNAL MEDICINE

## 2023-02-08 PROCEDURE — 87428 SARSCOV & INF VIR A&B AG IA: CPT | Performed by: INTERNAL MEDICINE

## 2023-02-08 RX ORDER — CEPHALEXIN 500 MG/1
500 CAPSULE ORAL 3 TIMES DAILY
Qty: 30 CAPSULE | Refills: 0 | Status: SHIPPED | OUTPATIENT
Start: 2023-02-08 | End: 2023-02-18

## 2023-02-08 NOTE — PROGRESS NOTES
"Chief Complaint  Cough, Sinusitis, head pressure, and Hypertension    Subjective        Ted Saldivar presents to [facility]  History of Present Illness  HPI: Patient is here to follow up on the blood pressure  The patient is taking the blood pressure medications as prescribed and has had no side effects. The patient is also here to follow up on the cholesterol and is trying to follow a diet and had lab work done .  The patient also needs refills on medications .  The patient also complains of  nasal discharge   And sinus congestion since the past few days, patient has been trying over the counter medications with not much relief in the symptoms , patient also complains of cough ,he denies a fever.    Objective   Vital Signs:  /82   Pulse 65   Temp 98.4 °F (36.9 °C)   Resp 16   Ht 177.8 cm (70\")   Wt 86.2 kg (190 lb)   SpO2 100%   BMI 27.26 kg/m²   Estimated body mass index is 27.26 kg/m² as calculated from the following:    Height as of this encounter: 177.8 cm (70\").    Weight as of this encounter: 86.2 kg (190 lb).       BMI is >= 25 and <30. (Overweight) The following options were offered after discussion;: weight loss educational material (shared in after visit summary), exercise counseling/recommendations and nutrition counseling/recommendations      Physical Exam  Vitals and nursing note reviewed.   Constitutional:       General: He is not in acute distress.     Appearance: Normal appearance. He is not diaphoretic.   HENT:      Head: Normocephalic and atraumatic.      Right Ear: External ear normal.      Left Ear: External ear normal.      Nose: Rhinorrhea present.      Right Sinus: Maxillary sinus tenderness present.      Left Sinus: Maxillary sinus tenderness present.   Eyes:      Extraocular Movements: Extraocular movements intact.      Conjunctiva/sclera: Conjunctivae normal.   Neck:      Trachea: Trachea normal.   Cardiovascular:      Rate and Rhythm: Normal " rate and regular rhythm.      Heart sounds: Normal heart sounds.   Pulmonary:      Effort: Pulmonary effort is normal. No respiratory distress.   Abdominal:      General: Abdomen is flat.   Musculoskeletal:      Cervical back: Neck supple.      Comments: Moves all limbs   Skin:     General: Skin is warm and dry.      Findings: No erythema.   Neurological:      Mental Status: He is alert and oriented to person, place, and time.      Comments: No gross motor or sensory deficits        Result Review :    Common labs    Common Labs 4/26/22 4/26/22 4/26/22 2/1/23 2/1/23 2/1/23    1047 1047 1047 0948 0948 0948   Glucose  95   97    BUN  25 (A)   22    Creatinine  1.03   1.12    Sodium  141   143    Potassium  5.2   5.4 (A)    Chloride  107   105    Calcium  9.4   10.0    Total Protein  6.8   6.7    Albumin  4.20   4.3    Total Bilirubin  0.6   0.7    Alkaline Phosphatase  86   80    AST (SGOT)  20   18    ALT (SGPT)  17   13    WBC 8.61   13.89 (A)     Hemoglobin 13.3   13.1     Hematocrit 40.1   38.6     Platelets 278   287     Total Cholesterol   175   211 (A)   Triglycerides   47   79   HDL Cholesterol   55   51   LDL Cholesterol    111 (A)   146 (A)   (A) Abnormal value       Comments are available for some flowsheets but are not being displayed.                        Assessment and Plan   Diagnoses and all orders for this visit:    1. Benign essential hypertension (Primary)    2. Mixed hyperlipidemia  -     CBC & Differential  -     Comprehensive Metabolic Panel  -     Lipid Panel    3. Cough, unspecified type  -     POCT SARS-CoV-2 Antigen NELLIE    4. Acute recurrent maxillary sinusitis  -     cephalexin (KEFLEX) 500 MG capsule; Take 1 capsule by mouth 3 (Three) Times a Day for 10 days.  Dispense: 30 capsule; Refill: 0    Plan:  1.  Benign essential hypertension: Will continue current medication, low-sodium diet advised, Counseled to regularly check BP at home with goal averaging <130/80.   2.mixed hyperlipidemia:   Reviewed   fasting CMP and lipid panel.  Diet and exercise counseled,     3 .Acute maxillary sinusitis:   will   start oral antibiotics    4.  Cough: In office COVID test and flu test negative         I spent 30 minutes caring for Ted on this date of service. This time includes time spent by me in the following activities:preparing for the visit, reviewing tests, performing a medically appropriate examination and/or evaluation , counseling and educating the patient/family/caregiver, ordering medications, tests, or procedures and documenting information in the medical record  Follow Up   Patient was given instructions and counseling regarding his condition or for health maintenance advice. Please see specific information pulled into the AVS if appropriate.

## 2023-02-21 ENCOUNTER — PRE-ADMISSION TESTING (OUTPATIENT)
Dept: PREADMISSION TESTING | Facility: HOSPITAL | Age: 83
End: 2023-02-21
Payer: MEDICARE

## 2023-02-21 VITALS — HEIGHT: 70 IN | BODY MASS INDEX: 26.89 KG/M2 | WEIGHT: 187.83 LBS

## 2023-02-21 LAB
25(OH)D3 SERPL-MCNC: 37.7 NG/ML (ref 30–100)
ALBUMIN SERPL-MCNC: 4 G/DL (ref 3.5–5.2)
ALBUMIN/GLOB SERPL: 1.3 G/DL
ALP SERPL-CCNC: 82 U/L (ref 39–117)
ALT SERPL W P-5'-P-CCNC: 14 U/L (ref 1–41)
ANION GAP SERPL CALCULATED.3IONS-SCNC: 9 MMOL/L (ref 5–15)
APTT PPP: 29 SECONDS (ref 22–39)
AST SERPL-CCNC: 22 U/L (ref 1–40)
BASOPHILS # BLD AUTO: 0.1 10*3/MM3 (ref 0–0.2)
BASOPHILS NFR BLD AUTO: 1 % (ref 0–1.5)
BILIRUB SERPL-MCNC: 0.4 MG/DL (ref 0–1.2)
BUN SERPL-MCNC: 30 MG/DL (ref 8–23)
BUN/CREAT SERPL: 26.1 (ref 7–25)
CALCIUM SPEC-SCNC: 9.1 MG/DL (ref 8.6–10.5)
CHLORIDE SERPL-SCNC: 106 MMOL/L (ref 98–107)
CO2 SERPL-SCNC: 26 MMOL/L (ref 22–29)
CREAT SERPL-MCNC: 1.15 MG/DL (ref 0.76–1.27)
CRP SERPL-MCNC: 0.77 MG/DL (ref 0–0.5)
DEPRECATED RDW RBC AUTO: 54.1 FL (ref 37–54)
EGFRCR SERPLBLD CKD-EPI 2021: 63.9 ML/MIN/1.73
EOSINOPHIL # BLD AUTO: 0.2 10*3/MM3 (ref 0–0.4)
EOSINOPHIL NFR BLD AUTO: 2 % (ref 0.3–6.2)
ERYTHROCYTE [DISTWIDTH] IN BLOOD BY AUTOMATED COUNT: 14.9 % (ref 12.3–15.4)
ERYTHROCYTE [SEDIMENTATION RATE] IN BLOOD: 40 MM/HR (ref 0–20)
GLOBULIN UR ELPH-MCNC: 3.1 GM/DL
GLUCOSE SERPL-MCNC: 72 MG/DL (ref 65–99)
HBA1C MFR BLD: 5.7 % (ref 4.8–5.6)
HCT VFR BLD AUTO: 38.3 % (ref 37.5–51)
HGB BLD-MCNC: 12.5 G/DL (ref 13–17.7)
IMM GRANULOCYTES # BLD AUTO: 0.04 10*3/MM3 (ref 0–0.05)
IMM GRANULOCYTES NFR BLD AUTO: 0.4 % (ref 0–0.5)
INR PPP: 1.14 (ref 0.84–1.13)
LYMPHOCYTES # BLD AUTO: 1.21 10*3/MM3 (ref 0.7–3.1)
LYMPHOCYTES NFR BLD AUTO: 12.2 % (ref 19.6–45.3)
MCH RBC QN AUTO: 32.6 PG (ref 26.6–33)
MCHC RBC AUTO-ENTMCNC: 32.6 G/DL (ref 31.5–35.7)
MCV RBC AUTO: 99.7 FL (ref 79–97)
MONOCYTES # BLD AUTO: 0.83 10*3/MM3 (ref 0.1–0.9)
MONOCYTES NFR BLD AUTO: 8.4 % (ref 5–12)
NEUTROPHILS NFR BLD AUTO: 7.55 10*3/MM3 (ref 1.7–7)
NEUTROPHILS NFR BLD AUTO: 76 % (ref 42.7–76)
NRBC BLD AUTO-RTO: 0 /100 WBC (ref 0–0.2)
PLATELET # BLD AUTO: 292 10*3/MM3 (ref 140–450)
PMV BLD AUTO: 10.8 FL (ref 6–12)
POTASSIUM SERPL-SCNC: 4.6 MMOL/L (ref 3.5–5.2)
PROT SERPL-MCNC: 7.1 G/DL (ref 6–8.5)
PROTHROMBIN TIME: 14.5 SECONDS (ref 11.4–14.4)
RBC # BLD AUTO: 3.84 10*6/MM3 (ref 4.14–5.8)
SODIUM SERPL-SCNC: 141 MMOL/L (ref 136–145)
WBC NRBC COR # BLD: 9.93 10*3/MM3 (ref 3.4–10.8)

## 2023-02-21 PROCEDURE — 85025 COMPLETE CBC W/AUTO DIFF WBC: CPT

## 2023-02-21 PROCEDURE — 82985 ASSAY OF GLYCATED PROTEIN: CPT

## 2023-02-21 PROCEDURE — 85730 THROMBOPLASTIN TIME PARTIAL: CPT

## 2023-02-21 PROCEDURE — 93005 ELECTROCARDIOGRAM TRACING: CPT

## 2023-02-21 PROCEDURE — 93010 ELECTROCARDIOGRAM REPORT: CPT | Performed by: INTERNAL MEDICINE

## 2023-02-21 PROCEDURE — G0480 DRUG TEST DEF 1-7 CLASSES: HCPCS

## 2023-02-21 PROCEDURE — 85652 RBC SED RATE AUTOMATED: CPT

## 2023-02-21 PROCEDURE — 85610 PROTHROMBIN TIME: CPT

## 2023-02-21 PROCEDURE — 87081 CULTURE SCREEN ONLY: CPT

## 2023-02-21 PROCEDURE — 83036 HEMOGLOBIN GLYCOSYLATED A1C: CPT

## 2023-02-21 PROCEDURE — 80053 COMPREHEN METABOLIC PANEL: CPT

## 2023-02-21 PROCEDURE — 82306 VITAMIN D 25 HYDROXY: CPT

## 2023-02-21 PROCEDURE — 36415 COLL VENOUS BLD VENIPUNCTURE: CPT

## 2023-02-21 PROCEDURE — 86140 C-REACTIVE PROTEIN: CPT

## 2023-02-21 RX ORDER — ACETAMINOPHEN 500 MG
500 TABLET ORAL EVERY 6 HOURS PRN
COMMUNITY
End: 2023-03-03 | Stop reason: HOSPADM

## 2023-02-21 NOTE — PAT
Discussed with patient options for receiving total joint replacement education and assessed patient's ability and preference. Joint Replacement Guide given to patient during PAT visit since not received a copy within the last year. Encouraged patient/family to read guide thoroughly and notify PAT staff with any questions or concerns. Handout provided directing patient to links to watch online videos related to joint replacement surgery on the [facility] website. The handout gives detailed instructions for joining an online joint replacement class through Zoom or phone conference offered on . Patient agreed to participate by watching videos online. Patient verbalized understanding of instructions and to complete the online learning tool survey. Encouraged to share information with family and/or . An overview of the joint replacement education was provided during the visit including general perioperative instructions that are routine for all surgical patients (PAT PASS, wipes, directions to pre-op, etc.).    Patient to apply Chlorhexadine wipes  to surgical area (as instructed) the night before procedure and the AM of procedure. Wipes provided.    Patient instructed to drink 20 ounces of Gatorade and it needs to be completed 1 hour (for Main OR patients) or 2 hours (scheduled  section & [facility] patients) before given arrival time for procedure (NO RED Gatorade)    Patient verbalized understanding.    Per Anesthesia Request, patient instructed not to take their ACE/ARB medications on the AM of surgery.    Clean catch urinalysis not indicated because patient denied recent urinary frequency, urinary urgency, burning/pain upon urination, or flank pain. No recent UTIs.    Left voicemail with Rosa  surgery scheduler, that pt reports being treated for a sinus infection with ABX, last dose was 2023.  Pt did not recall name of ABX or dosage.  Denies any infection symptoms currently.   EKG  CLEARED BY DR. ZARAGOZA.

## 2023-02-22 LAB — FRUCTOSAMINE SERPL-SCNC: 250 UMOL/L (ref 0–285)

## 2023-02-23 LAB — MRSA SPEC QL CULT: NORMAL

## 2023-02-26 LAB
COTININE SERPL-MCNC: <1 NG/ML
NICOTINE SERPL-MCNC: <1 NG/ML
QT INTERVAL: 448 MS
QTC INTERVAL: 473 MS

## 2023-03-01 ENCOUNTER — ANESTHESIA EVENT (OUTPATIENT)
Dept: PERIOP | Facility: HOSPITAL | Age: 83
End: 2023-03-01
Payer: MEDICARE

## 2023-03-01 RX ORDER — SODIUM CHLORIDE 0.9 % (FLUSH) 0.9 %
10 SYRINGE (ML) INJECTION EVERY 12 HOURS SCHEDULED
Status: CANCELLED | OUTPATIENT
Start: 2023-03-01

## 2023-03-01 RX ORDER — SODIUM CHLORIDE 0.9 % (FLUSH) 0.9 %
10 SYRINGE (ML) INJECTION AS NEEDED
Status: CANCELLED | OUTPATIENT
Start: 2023-03-01

## 2023-03-01 RX ORDER — SODIUM CHLORIDE 9 MG/ML
40 INJECTION, SOLUTION INTRAVENOUS AS NEEDED
Status: CANCELLED | OUTPATIENT
Start: 2023-03-01

## 2023-03-01 RX ORDER — FAMOTIDINE 10 MG/ML
20 INJECTION, SOLUTION INTRAVENOUS ONCE
Status: CANCELLED | OUTPATIENT
Start: 2023-03-01 | End: 2023-03-01

## 2023-03-02 ENCOUNTER — HOSPITAL ENCOUNTER (OUTPATIENT)
Facility: HOSPITAL | Age: 83
LOS: 1 days | Discharge: HOME | End: 2023-03-03
Attending: ORTHOPAEDIC SURGERY | Admitting: ORTHOPAEDIC SURGERY
Payer: MEDICARE

## 2023-03-02 ENCOUNTER — APPOINTMENT (OUTPATIENT)
Dept: GENERAL RADIOLOGY | Facility: HOSPITAL | Age: 83
End: 2023-03-02
Payer: MEDICARE

## 2023-03-02 ENCOUNTER — ANESTHESIA (OUTPATIENT)
Dept: PERIOP | Facility: HOSPITAL | Age: 83
End: 2023-03-02
Payer: MEDICARE

## 2023-03-02 DIAGNOSIS — Z96.641 STATUS POST TOTAL HIP REPLACEMENT, RIGHT: Primary | ICD-10-CM

## 2023-03-02 DIAGNOSIS — I10 BENIGN ESSENTIAL HYPERTENSION: ICD-10-CM

## 2023-03-02 PROBLEM — M16.11 ARTHRITIS OF RIGHT HIP: Status: ACTIVE | Noted: 2023-03-02

## 2023-03-02 PROCEDURE — C1713 ANCHOR/SCREW BN/BN,TIS/BN: HCPCS | Performed by: ORTHOPAEDIC SURGERY

## 2023-03-02 PROCEDURE — 27130 TOTAL HIP ARTHROPLASTY: CPT | Performed by: PHYSICIAN ASSISTANT

## 2023-03-02 PROCEDURE — 0 MEPIVACAINE HCL (PF) 1.5 % SOLUTION: Performed by: ANESTHESIOLOGY

## 2023-03-02 PROCEDURE — 76000 FLUOROSCOPY <1 HR PHYS/QHP: CPT

## 2023-03-02 PROCEDURE — 25010000002 CEFAZOLIN IN DEXTROSE 2-4 GM/100ML-% SOLUTION: Performed by: ORTHOPAEDIC SURGERY

## 2023-03-02 PROCEDURE — C1755 CATHETER, INTRASPINAL: HCPCS | Performed by: ORTHOPAEDIC SURGERY

## 2023-03-02 PROCEDURE — 25010000002 FENTANYL CITRATE (PF) 100 MCG/2ML SOLUTION: Performed by: ANESTHESIOLOGY

## 2023-03-02 PROCEDURE — 97116 GAIT TRAINING THERAPY: CPT

## 2023-03-02 PROCEDURE — 97110 THERAPEUTIC EXERCISES: CPT

## 2023-03-02 PROCEDURE — G0378 HOSPITAL OBSERVATION PER HR: HCPCS

## 2023-03-02 PROCEDURE — C1776 JOINT DEVICE (IMPLANTABLE): HCPCS | Performed by: ORTHOPAEDIC SURGERY

## 2023-03-02 PROCEDURE — 73502 X-RAY EXAM HIP UNI 2-3 VIEWS: CPT

## 2023-03-02 PROCEDURE — 25010000002 DEXAMETHASONE PER 1 MG: Performed by: ANESTHESIOLOGY

## 2023-03-02 PROCEDURE — 97161 PT EVAL LOW COMPLEX 20 MIN: CPT

## 2023-03-02 PROCEDURE — 25010000002 PROPOFOL 10 MG/ML EMULSION: Performed by: ANESTHESIOLOGY

## 2023-03-02 PROCEDURE — 25010000002 EPINEPHRINE 1 MG/ML SOLUTION: Performed by: ORTHOPAEDIC SURGERY

## 2023-03-02 PROCEDURE — 25010000002 VANCOMYCIN 1 G RECONSTITUTED SOLUTION: Performed by: ORTHOPAEDIC SURGERY

## 2023-03-02 PROCEDURE — 25010000002 KETOROLAC TROMETHAMINE PER 15 MG: Performed by: ORTHOPAEDIC SURGERY

## 2023-03-02 PROCEDURE — 25010000002 ONDANSETRON PER 1 MG: Performed by: ANESTHESIOLOGY

## 2023-03-02 PROCEDURE — 25010000002 HYDROMORPHONE 1 MG/ML SOLUTION

## 2023-03-02 PROCEDURE — 25010000002 FENTANYL CITRATE (PF) 50 MCG/ML SOLUTION

## 2023-03-02 PROCEDURE — 25010000002 CLONIDINE PER 1 MG: Performed by: ORTHOPAEDIC SURGERY

## 2023-03-02 DEVICE — TOTL HIP HI DEMAND STRYKER: Type: IMPLANTABLE DEVICE | Site: HIP | Status: FUNCTIONAL

## 2023-03-02 DEVICE — CMT BONE SIMPLEX/P TMYCIN FDOS 10PK: Type: IMPLANTABLE DEVICE | Site: HIP | Status: FUNCTIONAL

## 2023-03-02 DEVICE — V40 STEM
Type: IMPLANTABLE DEVICE | Site: HIP | Status: FUNCTIONAL
Brand: EXETER

## 2023-03-02 DEVICE — DEV CONTRL TISS STRATAFIX SYMM PDS PLUS VIL CT-1 45CM: Type: IMPLANTABLE DEVICE | Site: HIP | Status: FUNCTIONAL

## 2023-03-02 DEVICE — SUT NONABS MAXBRAID/PE NMBR2 HC5 38IN WHT 900333: Type: IMPLANTABLE DEVICE | Site: HIP | Status: FUNCTIONAL

## 2023-03-02 DEVICE — TRIDENT II TRITANIUM CLUSTER 60G
Type: IMPLANTABLE DEVICE | Site: HIP | Status: FUNCTIONAL
Brand: TRIDENT II

## 2023-03-02 DEVICE — CERAMIC V40 FEMORAL HEAD
Type: IMPLANTABLE DEVICE | Site: HIP | Status: FUNCTIONAL
Brand: BIOLOX

## 2023-03-02 DEVICE — 6.5MM LOW PROFILE HEX SCREW 35MM
Type: IMPLANTABLE DEVICE | Site: HIP | Status: FUNCTIONAL
Brand: TRIDENT II

## 2023-03-02 DEVICE — TRIDENT X3 0 DEGREE POLYETHYLENE INSERT
Type: IMPLANTABLE DEVICE | Site: HIP | Status: FUNCTIONAL
Brand: TRIDENT X3 INSERT

## 2023-03-02 DEVICE — IMPLANTABLE DEVICE: Type: IMPLANTABLE DEVICE | Site: HIP | Status: FUNCTIONAL

## 2023-03-02 RX ORDER — TRAMADOL HYDROCHLORIDE 50 MG/1
50 TABLET ORAL EVERY 8 HOURS PRN
Status: DISCONTINUED | OUTPATIENT
Start: 2023-03-02 | End: 2023-03-03 | Stop reason: HOSPADM

## 2023-03-02 RX ORDER — MAGNESIUM HYDROXIDE 1200 MG/15ML
LIQUID ORAL AS NEEDED
Status: DISCONTINUED | OUTPATIENT
Start: 2023-03-02 | End: 2023-03-02 | Stop reason: HOSPADM

## 2023-03-02 RX ORDER — FAMOTIDINE 20 MG/1
20 TABLET, FILM COATED ORAL ONCE
Status: COMPLETED | OUTPATIENT
Start: 2023-03-02 | End: 2023-03-02

## 2023-03-02 RX ORDER — CEFAZOLIN SODIUM 2 G/100ML
2 INJECTION, SOLUTION INTRAVENOUS EVERY 8 HOURS
Status: COMPLETED | OUTPATIENT
Start: 2023-03-02 | End: 2023-03-02

## 2023-03-02 RX ORDER — CEFAZOLIN SODIUM 2 G/100ML
2 INJECTION, SOLUTION INTRAVENOUS ONCE
Status: COMPLETED | OUTPATIENT
Start: 2023-03-02 | End: 2023-03-02

## 2023-03-02 RX ORDER — EPHEDRINE SULFATE 50 MG/ML
INJECTION INTRAVENOUS AS NEEDED
Status: DISCONTINUED | OUTPATIENT
Start: 2023-03-02 | End: 2023-03-02 | Stop reason: SURG

## 2023-03-02 RX ORDER — ONDANSETRON 2 MG/ML
4 INJECTION INTRAMUSCULAR; INTRAVENOUS ONCE AS NEEDED
Status: DISCONTINUED | OUTPATIENT
Start: 2023-03-02 | End: 2023-03-02 | Stop reason: HOSPADM

## 2023-03-02 RX ORDER — ASPIRIN 81 MG/1
81 TABLET ORAL EVERY 12 HOURS SCHEDULED
Status: DISCONTINUED | OUTPATIENT
Start: 2023-03-03 | End: 2023-03-03 | Stop reason: HOSPADM

## 2023-03-02 RX ORDER — OXYCODONE HYDROCHLORIDE 5 MG/1
10 TABLET ORAL EVERY 4 HOURS PRN
Status: DISCONTINUED | OUTPATIENT
Start: 2023-03-02 | End: 2023-03-02

## 2023-03-02 RX ORDER — NALOXONE HCL 0.4 MG/ML
0.4 VIAL (ML) INJECTION AS NEEDED
Status: DISCONTINUED | OUTPATIENT
Start: 2023-03-02 | End: 2023-03-02 | Stop reason: HOSPADM

## 2023-03-02 RX ORDER — FENTANYL CITRATE 50 UG/ML
INJECTION, SOLUTION INTRAMUSCULAR; INTRAVENOUS
Status: COMPLETED
Start: 2023-03-02 | End: 2023-03-02

## 2023-03-02 RX ORDER — ACETAMINOPHEN 160 MG
TABLET,DISINTEGRATING ORAL AS NEEDED
Status: DISCONTINUED | OUTPATIENT
Start: 2023-03-02 | End: 2023-03-02 | Stop reason: HOSPADM

## 2023-03-02 RX ORDER — LIDOCAINE HYDROCHLORIDE 10 MG/ML
INJECTION, SOLUTION EPIDURAL; INFILTRATION; INTRACAUDAL; PERINEURAL AS NEEDED
Status: DISCONTINUED | OUTPATIENT
Start: 2023-03-02 | End: 2023-03-02 | Stop reason: SURG

## 2023-03-02 RX ORDER — SODIUM CHLORIDE, SODIUM LACTATE, POTASSIUM CHLORIDE, CALCIUM CHLORIDE 600; 310; 30; 20 MG/100ML; MG/100ML; MG/100ML; MG/100ML
100 INJECTION, SOLUTION INTRAVENOUS CONTINUOUS
Status: DISCONTINUED | OUTPATIENT
Start: 2023-03-02 | End: 2023-03-02 | Stop reason: SDUPTHER

## 2023-03-02 RX ORDER — TRANEXAMIC ACID 10 MG/ML
1000 INJECTION, SOLUTION INTRAVENOUS ONCE
Status: COMPLETED | OUTPATIENT
Start: 2023-03-02 | End: 2023-03-02

## 2023-03-02 RX ORDER — PROPOFOL 10 MG/ML
VIAL (ML) INTRAVENOUS AS NEEDED
Status: DISCONTINUED | OUTPATIENT
Start: 2023-03-02 | End: 2023-03-02 | Stop reason: SURG

## 2023-03-02 RX ORDER — SODIUM CHLORIDE, SODIUM LACTATE, POTASSIUM CHLORIDE, CALCIUM CHLORIDE 600; 310; 30; 20 MG/100ML; MG/100ML; MG/100ML; MG/100ML
100 INJECTION, SOLUTION INTRAVENOUS CONTINUOUS
Status: DISCONTINUED | OUTPATIENT
Start: 2023-03-02 | End: 2023-03-03 | Stop reason: HOSPADM

## 2023-03-02 RX ORDER — FENTANYL CITRATE 50 UG/ML
50 INJECTION, SOLUTION INTRAMUSCULAR; INTRAVENOUS
Status: DISCONTINUED | OUTPATIENT
Start: 2023-03-02 | End: 2023-03-02 | Stop reason: HOSPADM

## 2023-03-02 RX ORDER — DEXAMETHASONE SODIUM PHOSPHATE 4 MG/ML
INJECTION, SOLUTION INTRA-ARTICULAR; INTRALESIONAL; INTRAMUSCULAR; INTRAVENOUS; SOFT TISSUE AS NEEDED
Status: DISCONTINUED | OUTPATIENT
Start: 2023-03-02 | End: 2023-03-02 | Stop reason: SURG

## 2023-03-02 RX ORDER — MELOXICAM 15 MG/1
15 TABLET ORAL DAILY
Status: DISCONTINUED | OUTPATIENT
Start: 2023-03-03 | End: 2023-03-03 | Stop reason: HOSPADM

## 2023-03-02 RX ORDER — LABETALOL HYDROCHLORIDE 5 MG/ML
10 INJECTION, SOLUTION INTRAVENOUS EVERY 4 HOURS PRN
Status: DISCONTINUED | OUTPATIENT
Start: 2023-03-02 | End: 2023-03-03 | Stop reason: HOSPADM

## 2023-03-02 RX ORDER — EPINEPHRINE 1 MG/ML
INJECTION INTRAMUSCULAR; INTRAVENOUS; SUBCUTANEOUS AS NEEDED
Status: DISCONTINUED | OUTPATIENT
Start: 2023-03-02 | End: 2023-03-02 | Stop reason: HOSPADM

## 2023-03-02 RX ORDER — HYDROMORPHONE HYDROCHLORIDE 1 MG/ML
0.5 INJECTION, SOLUTION INTRAMUSCULAR; INTRAVENOUS; SUBCUTANEOUS
Status: DISCONTINUED | OUTPATIENT
Start: 2023-03-02 | End: 2023-03-03 | Stop reason: HOSPADM

## 2023-03-02 RX ORDER — LIDOCAINE HYDROCHLORIDE 10 MG/ML
0.5 INJECTION, SOLUTION EPIDURAL; INFILTRATION; INTRACAUDAL; PERINEURAL ONCE AS NEEDED
Status: COMPLETED | OUTPATIENT
Start: 2023-03-02 | End: 2023-03-02

## 2023-03-02 RX ORDER — LOSARTAN POTASSIUM 50 MG/1
50 TABLET ORAL NIGHTLY
Status: DISCONTINUED | OUTPATIENT
Start: 2023-03-02 | End: 2023-03-03 | Stop reason: HOSPADM

## 2023-03-02 RX ORDER — OXYCODONE HYDROCHLORIDE 5 MG/1
10 TABLET ORAL EVERY 4 HOURS PRN
Status: DISCONTINUED | OUTPATIENT
Start: 2023-03-02 | End: 2023-03-03 | Stop reason: HOSPADM

## 2023-03-02 RX ORDER — VANCOMYCIN HYDROCHLORIDE 1 G/20ML
INJECTION, POWDER, LYOPHILIZED, FOR SOLUTION INTRAVENOUS AS NEEDED
Status: DISCONTINUED | OUTPATIENT
Start: 2023-03-02 | End: 2023-03-02 | Stop reason: HOSPADM

## 2023-03-02 RX ORDER — MIDAZOLAM HYDROCHLORIDE 1 MG/ML
0.5 INJECTION INTRAMUSCULAR; INTRAVENOUS
Status: DISCONTINUED | OUTPATIENT
Start: 2023-03-02 | End: 2023-03-02 | Stop reason: HOSPADM

## 2023-03-02 RX ORDER — ACETAMINOPHEN 500 MG
1000 TABLET ORAL ONCE
Status: COMPLETED | OUTPATIENT
Start: 2023-03-02 | End: 2023-03-02

## 2023-03-02 RX ORDER — MELOXICAM 15 MG/1
15 TABLET ORAL ONCE
Status: COMPLETED | OUTPATIENT
Start: 2023-03-02 | End: 2023-03-02

## 2023-03-02 RX ORDER — KETOROLAC TROMETHAMINE 30 MG/ML
15 INJECTION, SOLUTION INTRAMUSCULAR; INTRAVENOUS EVERY 6 HOURS PRN
Status: DISCONTINUED | OUTPATIENT
Start: 2023-03-02 | End: 2023-03-03 | Stop reason: HOSPADM

## 2023-03-02 RX ORDER — ONDANSETRON 2 MG/ML
INJECTION INTRAMUSCULAR; INTRAVENOUS AS NEEDED
Status: DISCONTINUED | OUTPATIENT
Start: 2023-03-02 | End: 2023-03-02 | Stop reason: SURG

## 2023-03-02 RX ORDER — PHENYLEPHRINE HCL IN 0.9% NACL 1 MG/10 ML
SYRINGE (ML) INTRAVENOUS AS NEEDED
Status: DISCONTINUED | OUTPATIENT
Start: 2023-03-02 | End: 2023-03-02 | Stop reason: SURG

## 2023-03-02 RX ORDER — ALBUTEROL SULFATE 2.5 MG/3ML
2.5 SOLUTION RESPIRATORY (INHALATION) EVERY 6 HOURS PRN
Status: DISCONTINUED | OUTPATIENT
Start: 2023-03-02 | End: 2023-03-03 | Stop reason: HOSPADM

## 2023-03-02 RX ORDER — ATORVASTATIN CALCIUM 10 MG/1
10 TABLET, FILM COATED ORAL NIGHTLY
Status: DISCONTINUED | OUTPATIENT
Start: 2023-03-02 | End: 2023-03-03 | Stop reason: HOSPADM

## 2023-03-02 RX ORDER — OXYCODONE HYDROCHLORIDE 5 MG/1
5 TABLET ORAL EVERY 4 HOURS PRN
Status: DISCONTINUED | OUTPATIENT
Start: 2023-03-02 | End: 2023-03-03 | Stop reason: HOSPADM

## 2023-03-02 RX ORDER — FENTANYL CITRATE 50 UG/ML
INJECTION, SOLUTION INTRAMUSCULAR; INTRAVENOUS AS NEEDED
Status: DISCONTINUED | OUTPATIENT
Start: 2023-03-02 | End: 2023-03-02 | Stop reason: SURG

## 2023-03-02 RX ORDER — EPHEDRINE SULFATE 50 MG/ML
5 INJECTION, SOLUTION INTRAVENOUS ONCE AS NEEDED
Status: DISCONTINUED | OUTPATIENT
Start: 2023-03-02 | End: 2023-03-02 | Stop reason: HOSPADM

## 2023-03-02 RX ORDER — ACETAMINOPHEN 500 MG
1000 TABLET ORAL EVERY 8 HOURS
Status: DISCONTINUED | OUTPATIENT
Start: 2023-03-02 | End: 2023-03-03 | Stop reason: HOSPADM

## 2023-03-02 RX ORDER — SODIUM CHLORIDE, SODIUM LACTATE, POTASSIUM CHLORIDE, CALCIUM CHLORIDE 600; 310; 30; 20 MG/100ML; MG/100ML; MG/100ML; MG/100ML
9 INJECTION, SOLUTION INTRAVENOUS CONTINUOUS
Status: DISCONTINUED | OUTPATIENT
Start: 2023-03-02 | End: 2023-03-03 | Stop reason: HOSPADM

## 2023-03-02 RX ORDER — NALOXONE HCL 0.4 MG/ML
0.1 VIAL (ML) INJECTION
Status: DISCONTINUED | OUTPATIENT
Start: 2023-03-02 | End: 2023-03-03 | Stop reason: HOSPADM

## 2023-03-02 RX ADMIN — EPHEDRINE SULFATE 10 MG: 50 INJECTION INTRAVENOUS at 08:44

## 2023-03-02 RX ADMIN — TRANEXAMIC ACID 1000 MG: 10 INJECTION, SOLUTION INTRAVENOUS at 07:33

## 2023-03-02 RX ADMIN — ACETAMINOPHEN 1000 MG: 500 TABLET ORAL at 06:36

## 2023-03-02 RX ADMIN — TRANEXAMIC ACID 1000 MG: 10 INJECTION, SOLUTION INTRAVENOUS at 09:01

## 2023-03-02 RX ADMIN — Medication 100 MCG: at 08:37

## 2023-03-02 RX ADMIN — EPHEDRINE SULFATE 5 MG: 50 INJECTION INTRAVENOUS at 09:13

## 2023-03-02 RX ADMIN — CEFAZOLIN SODIUM 2 G: 2 INJECTION, SOLUTION INTRAVENOUS at 14:57

## 2023-03-02 RX ADMIN — EPHEDRINE SULFATE 5 MG: 50 INJECTION INTRAVENOUS at 08:26

## 2023-03-02 RX ADMIN — ACETAMINOPHEN 1000 MG: 500 TABLET ORAL at 21:52

## 2023-03-02 RX ADMIN — Medication 100 MCG: at 08:59

## 2023-03-02 RX ADMIN — Medication 100 MCG: at 09:32

## 2023-03-02 RX ADMIN — SODIUM CHLORIDE, POTASSIUM CHLORIDE, SODIUM LACTATE AND CALCIUM CHLORIDE 9 ML/HR: 600; 310; 30; 20 INJECTION, SOLUTION INTRAVENOUS at 06:53

## 2023-03-02 RX ADMIN — Medication 100 MCG: at 08:22

## 2023-03-02 RX ADMIN — FENTANYL CITRATE 50 MCG: 50 INJECTION, SOLUTION INTRAMUSCULAR; INTRAVENOUS at 12:51

## 2023-03-02 RX ADMIN — EPHEDRINE SULFATE 5 MG: 50 INJECTION INTRAVENOUS at 09:24

## 2023-03-02 RX ADMIN — FENTANYL CITRATE 50 MCG: 50 INJECTION, SOLUTION INTRAMUSCULAR; INTRAVENOUS at 11:58

## 2023-03-02 RX ADMIN — EPHEDRINE SULFATE 5 MG: 50 INJECTION INTRAVENOUS at 08:29

## 2023-03-02 RX ADMIN — ONDANSETRON 4 MG: 2 INJECTION INTRAMUSCULAR; INTRAVENOUS at 08:11

## 2023-03-02 RX ADMIN — ATORVASTATIN CALCIUM 10 MG: 10 TABLET, FILM COATED ORAL at 20:13

## 2023-03-02 RX ADMIN — EPHEDRINE SULFATE 5 MG: 50 INJECTION INTRAVENOUS at 08:54

## 2023-03-02 RX ADMIN — LIDOCAINE HYDROCHLORIDE 0.5 ML: 10 INJECTION, SOLUTION EPIDURAL; INFILTRATION; INTRACAUDAL; PERINEURAL at 06:36

## 2023-03-02 RX ADMIN — LIDOCAINE HYDROCHLORIDE 100 MG: 10 INJECTION, SOLUTION EPIDURAL; INFILTRATION; INTRACAUDAL; PERINEURAL at 07:35

## 2023-03-02 RX ADMIN — EPHEDRINE SULFATE 10 MG: 50 INJECTION INTRAVENOUS at 08:47

## 2023-03-02 RX ADMIN — OXYCODONE HYDROCHLORIDE 5 MG: 5 TABLET ORAL at 20:13

## 2023-03-02 RX ADMIN — MELOXICAM 15 MG: 15 TABLET ORAL at 06:36

## 2023-03-02 RX ADMIN — FAMOTIDINE 20 MG: 20 TABLET ORAL at 06:36

## 2023-03-02 RX ADMIN — Medication 50 MCG: at 08:29

## 2023-03-02 RX ADMIN — SODIUM CHLORIDE, POTASSIUM CHLORIDE, SODIUM LACTATE AND CALCIUM CHLORIDE: 600; 310; 30; 20 INJECTION, SOLUTION INTRAVENOUS at 09:14

## 2023-03-02 RX ADMIN — Medication 0.5 MG: at 11:31

## 2023-03-02 RX ADMIN — PROPOFOL 50 MG: 10 INJECTION, EMULSION INTRAVENOUS at 07:35

## 2023-03-02 RX ADMIN — Medication 100 MCG: at 09:04

## 2023-03-02 RX ADMIN — CEFAZOLIN SODIUM 2 G: 2 INJECTION, SOLUTION INTRAVENOUS at 22:46

## 2023-03-02 RX ADMIN — Medication 100 MCG: at 09:13

## 2023-03-02 RX ADMIN — Medication 100 MCG: at 08:49

## 2023-03-02 RX ADMIN — Medication 100 MCG: at 08:53

## 2023-03-02 RX ADMIN — Medication 100 MCG: at 08:00

## 2023-03-02 RX ADMIN — DEXAMETHASONE SODIUM PHOSPHATE 4 MG: 4 INJECTION, SOLUTION INTRAMUSCULAR; INTRAVENOUS at 08:11

## 2023-03-02 RX ADMIN — PROPOFOL 30 MG: 10 INJECTION, EMULSION INTRAVENOUS at 07:52

## 2023-03-02 RX ADMIN — FENTANYL CITRATE 50 MCG: 50 INJECTION, SOLUTION INTRAMUSCULAR; INTRAVENOUS at 11:15

## 2023-03-02 RX ADMIN — EPHEDRINE SULFATE 5 MG: 50 INJECTION INTRAVENOUS at 08:53

## 2023-03-02 RX ADMIN — Medication 50 MCG: at 08:11

## 2023-03-02 RX ADMIN — FENTANYL CITRATE 25 MCG: 50 INJECTION, SOLUTION INTRAMUSCULAR; INTRAVENOUS at 09:35

## 2023-03-02 RX ADMIN — Medication 100 MCG: at 09:40

## 2023-03-02 RX ADMIN — PROPOFOL 80 MCG/KG/MIN: 10 INJECTION, EMULSION INTRAVENOUS at 07:46

## 2023-03-02 RX ADMIN — Medication 100 MCG: at 08:16

## 2023-03-02 RX ADMIN — HYDROMORPHONE HYDROCHLORIDE 0.5 MG: 1 INJECTION, SOLUTION INTRAMUSCULAR; INTRAVENOUS; SUBCUTANEOUS at 11:31

## 2023-03-02 RX ADMIN — CEFAZOLIN SODIUM 2 G: 2 INJECTION, SOLUTION INTRAVENOUS at 07:29

## 2023-03-02 RX ADMIN — MEPIVACAINE HYDROCHLORIDE 4 ML: 15 INJECTION, SOLUTION EPIDURAL; INFILTRATION at 07:41

## 2023-03-02 RX ADMIN — ACETAMINOPHEN 1000 MG: 500 TABLET ORAL at 14:57

## 2023-03-02 RX ADMIN — PROPOFOL 20 MG: 10 INJECTION, EMULSION INTRAVENOUS at 08:56

## 2023-03-02 NOTE — THERAPY EVALUATION
Patient Name: Ted Saldivar  : 1940    MRN: [id number]                              Today's Date: 3/2/2023       Admit Date: 3/2/2023    Visit Dx: No diagnosis found.  Patient Active Problem List   Diagnosis   • Neoplasm of skin   • Generalized abdominal pain   • Acute infective gastroenteritis   • Essential hypertension   • Dyslipidemia   • Nuclear sclerotic cataract of right eye   • Arthritis of right hip   • Status post total hip replacement, right     Past Medical History:   Diagnosis Date   • Arthritis    • Hyperlipidemia    • Hypertension      Past Surgical History:   Procedure Laterality Date   • CATARACT EXTRACTION W/ INTRAOCULAR LENS IMPLANT Right 2021    Procedure: CATARACT PHACO EXTRACTION WITH INTRAOCULAR LENS IMPLANT RIGHT;  Surgeon: Gio Mcmanus MD;  Location: [facility];  Service: Ophthalmology;  Laterality: Right;   • CATARACT EXTRACTION W/ INTRAOCULAR LENS IMPLANT Left 2021    Procedure: CATARACT PHACO EXTRACTION WITH INTRAOCULAR LENS IMPLANT LEFT complex with malyugin ring;  Surgeon: Gio Mcmanus MD;  Location: [facility];  Service: Ophthalmology;  Laterality: Left;   • COLONOSCOPY     • HERNIA REPAIR Bilateral    • ROTATOR CUFF REPAIR Right    • TOTAL HIP ARTHROPLASTY Left 2018      General Information     Row Name 23 1409          Physical Therapy Time and Intention    Document Type evaluation  -LR     Mode of Treatment physical therapy;individual therapy  -LR     Row Name 23 1409          General Information    Patient Profile Reviewed yes  -LR     Prior Level of Function min assist:;all household mobility;community mobility;gait;transfer;bed mobility;ADL's  used rollator at all times PTA  -LR     Existing Precautions/Restrictions fall;right;hip, anterior  -LR     Barriers to Rehab previous functional deficit  -LR     Row Name 23 1409          Living Environment    People in Home spouse  can assist at all times upon d/c home  -LR      Row Name 03/02/23 1409          Home Main Entrance    Number of Stairs, Main Entrance one  -LR     Stair Railings, Main Entrance none  -LR     Row Name 03/02/23 1409          Stairs Within Home, Primary    Number of Stairs, Within Home, Primary none  -LR     Row Name 03/02/23 1409          Cognition    Orientation Status (Cognition) oriented x 4  -LR     Row Name 03/02/23 1409          Safety Issues, Functional Mobility    Safety Issues Affecting Function (Mobility) safety precautions follow-through/compliance;safety precaution awareness  -LR     Impairments Affecting Function (Mobility) strength;pain;endurance/activity tolerance;range of motion (ROM)  -LR           User Key  (r) = Recorded By, (t) = Taken By, (c) = Cosigned By    Initials Name Provider Type    LR Kait Valderrama, PT Physical Therapist               Mobility     Row Name 03/02/23 1409          Bed Mobility    Bed Mobility supine-sit  -LR     Supine-Sit [place] (Bed Mobility) verbal cues;standby assist  -LR     Assistive Device (Bed Mobility) head of bed elevated;bed rails  -LR     Comment, (Bed Mobility) Verbal cues to move LEs towards EOB and to flex L knee and bridge to scoot hips towards EOB. Verbal cues to move LEs off EOB and to push up from bed to raise trunk into sitting and to scoot hips out to get feet on floor. Denied dizziness upon sitting up.  -LR     Row Name 03/02/23 1409          Transfers    Comment, (Transfers) Verbal cues for correct hand placement with t/f and to step R LE out before t/f for comfort. Cues to lock brakes prior to t/f.  -LR     Row Name 03/02/23 1409          Bed-Chair Transfer    Bed-Chair [place] (Transfers) not tested  -LR     Row Name 03/02/23 1409          Sit-Stand Transfer    Sit-Stand [place] (Transfers) verbal cues;contact guard;2 person assist  -LR     Assistive Device (Sit-Stand Transfers) walker, front-wheeled  -LR     Row Name 03/02/23 1409          Gait/Stairs (Locomotion)     [place] (Gait) verbal cues;contact guard;2 person assist  -LR     Assistive Device (Gait) other (see comments)  rollator  -LR     Ambulated day of surgery or within 4 hours of PACU discharge yes  -LR     Distance in Feet (Gait) 200  -LR     Deviations/Abnormal Patterns (Gait) bilateral deviations;fahad decreased;gait speed decreased;stride length decreased;right sided deviations;antalgic  -LR     Bilateral Gait Deviations forward flexed posture;heel strike decreased  -LR     Right Sided Gait Deviations weight shift ability decreased  -LR     [place] (Stairs) not tested  -LR     Comment, (Gait/Stairs) Patient ambulated with step through gait pattern at slow pace. Verbal cues for upright posture, decreased UE weight bearing, relaxed shoulders, increased R LE weight bearing/stance phase, and decreased UE weight bearing. Improved with cues for correction. Gait limited by fatigue.  -LR     Row Name 03/02/23 1409          Mobility    Extremity Weight-bearing Status right lower extremity  -LR     Right Lower Extremity (Weight-bearing Status) weight-bearing as tolerated (WBAT)  -LR           User Key  (r) = Recorded By, (t) = Taken By, (c) = Cosigned By    Initials Name Provider Type    LR Kait Valderrama, PT Physical Therapist               Obj/Interventions     Row Name 03/02/23 1409          Range of Motion Comprehensive    General Range of Motion lower extremity range of motion deficits identified  -LR     Row Name 03/02/23 1409          Strength Comprehensive (MMT)    General Manual Muscle Testing (MMT) Assessment lower extremity strength deficits identified  -LR     Row Name 03/02/23 1409          Motor Skills    Therapeutic Exercise ankle;knee;hip;other (see comments)  cues for technique; no assist required  -LR     Row Name 03/02/23 1409          Hip (Therapeutic Exercise)    Hip (Therapeutic Exercise) strengthening exercise;isometric exercises  -LR     Hip Isometrics (Therapeutic  Exercise) bilateral;gluteal sets;supine;10 repetitions  -LR     Hip Strengthening (Therapeutic Exercise) right;heel slides;aBduction;marching while standing;mini squats;sitting;standing;3 repetitions  -     Row Name 03/02/23 1409          Knee (Therapeutic Exercise)    Knee (Therapeutic Exercise) strengthening exercise;isometric exercises  -     Knee Isometrics (Therapeutic Exercise) bilateral;quad sets;supine;10 repetitions  -LR     Knee Strengthening (Therapeutic Exercise) right;LAQ (long arc quad);sitting;standing;3 repetitions  calf raises  -     Row Name 03/02/23 1409          Ankle (Therapeutic Exercise)    Ankle (Therapeutic Exercise) AROM (active range of motion)  -     Ankle AROM (Therapeutic Exercise) bilateral;dorsiflexion;plantarflexion;supine;10 repetitions  -     Row Name 03/02/23 1409          Balance    Balance Assessment sitting static balance;standing static balance;standing dynamic balance;sitting dynamic balance  -     Static Sitting Balance standby assist  -     Dynamic Sitting Balance standby assist  -LR     Position, Sitting Balance unsupported;sitting edge of bed  -     Static Standing Balance contact guard;2-person assist  -LR     Dynamic Standing Balance contact guard;2-person assist  -LR     Position/Device Used, Standing Balance supported;other (see comments)  rollator  -     Row Name 03/02/23 1409          Sensory Assessment (Somatosensory)    Sensory Assessment (Somatosensory) LE sensation intact  denies numbness/tingling; light touch equal and intact upon assessment; able to actively DF bilaterally  -     Row Name 03/02/23 1409          General Lower Extremity Assessment (Range of Motion)    Lower Extremity: Range of Motion LLE ROM WFL;hip, right: LE ROM  -     Comment: Lower Extremity ROM R hip AROM impaired 25%  -     Row Name 03/02/23 1409          Lower Extremity (Manual Muscle Testing)    Lower Extremity: Manual Muscle Testing (MMT) left LE strength is  WFL;right hip strength deficit  -LR     Comment, MMT: Lower Extremity R hip functionally 4-/5  -LR           User Key  (r) = Recorded By, (t) = Taken By, (c) = Cosigned By    Initials Name Provider Type    Kait Vences, PT Physical Therapist               Goals/Plan     Row Name 03/02/23 1409          Bed Mobility Goal 1 (PT)    Activity/Assistive Device (Bed Mobility Goal 1, PT) sit to supine/supine to sit  -LR     [place] Level/Cues Needed (Bed Mobility Goal 1, PT) modified independence  -LR     Time Frame (Bed Mobility Goal 1, PT) long term goal (LTG);3 days  -LR     Progress/Outcomes (Bed Mobility Goal 1, PT) goal ongoing  -LR     Row Name 03/02/23 1409          Transfer Goal 1 (PT)    Activity/Assistive Device (Transfer Goal 1, PT) sit-to-stand/stand-to-sit;walker, rolling  -LR     [place] Level/Cues Needed (Transfer Goal 1, PT) modified independence  -LR     Time Frame (Transfer Goal 1, PT) long term goal (LTG);3 days  -LR     Progress/Outcome (Transfer Goal 1, PT) goal ongoing  -     Row Name 03/02/23 1409          Gait Training Goal 1 (PT)    Activity/Assistive Device (Gait Training Goal 1, PT) gait (walking locomotion);other (see comments)  rollator  -LR     [place] Level (Gait Training Goal 1, PT) modified independence  -LR     Distance (Gait Training Goal 1, PT) 500 feet  -LR     Time Frame (Gait Training Goal 1, PT) long term goal (LTG);3 days  -LR     Progress/Outcome (Gait Training Goal 1, PT) goal ongoing  -     Row Name 03/02/23 1409          Stairs Goal 1 (PT)    Activity/Assistive Device (Stairs Goal 1, PT) ascending stairs;descending stairs;step-to-step;walker, rolling  -LR     [place] Level/Cues Needed (Stairs Goal 1, PT) contact guard required  -LR     Number of Stairs (Stairs Goal 1, PT) 1  -LR     Time Frame (Stairs Goal 1, PT) long term goal (LTG);3 days  -LR     Progress/Outcome (Stairs Goal 1, PT) goal ongoing  -     Row Name 03/02/23 0159           Therapy Assessment/Plan (PT)    Planned Therapy Interventions (PT) balance training;bed mobility training;gait training;home exercise program;patient/family education;stair training;strengthening;ROM (range of motion);transfer training  -LR           User Key  (r) = Recorded By, (t) = Taken By, (c) = Cosigned By    Initials Name Provider Type    LR Kait Valdrerama, PT Physical Therapist               Clinical Impression     Row Name 03/02/23 140          Pain    Pretreatment Pain Rating 3/10  -LR     Posttreatment Pain Rating 2/10  -LR     Pain Location - Side/Orientation Right  -LR     Pain Location anterior  -LR     Pain Location - hip  -LR     Pain Intervention(s) Ambulation/increased activity;Repositioned;Cold applied  -LR     Row Name 03/02/23 6109          Plan of Care Review    Plan of Care Reviewed With patient  -LR     Progress improving  -LR     Outcome Evaluation Patient ambulated 200 feet with rollator, CGAx2, step through gait pattern, limited by fatigue. CGAx2 to stand, SBA to t/f to EOB. Patient currently below baseline. Demonstrated decreased functional mobility status and decreased R hip strength/ROM. Will continue to address these deficits to promote return to PLOF. Recommend d/c home with family and HHPT at d/c.  -LR     Row Name 03/02/23 2016          Therapy Assessment/Plan (PT)    Patient/Family Therapy Goals Statement (PT) go home  -LR     Rehab Potential (PT) good, to achieve stated therapy goals  -LR     Criteria for Skilled Interventions Met (PT) yes;meets criteria;skilled treatment is necessary  -LR     Therapy Frequency (PT) 2 times/day  -LR     Row Name 03/02/23 7792          Positioning and Restraints    Pre-Treatment Position in bed  -LR     Post Treatment Position chair  -LR     In Chair notified nsg;reclined;sitting;call light within reach;encouraged to call for assist;exit alarm on;legs elevated;compression device  -LR           User Key  (r) = Recorded By, (t) = Taken By,  (c) = Cosigned By    Initials Name Provider Type    Kait Vences, PT Physical Therapist               Outcome Measures     Row Name 03/02/23 1409          How much help from another person do you currently need...    Turning from your back to your side while in flat bed without using bedrails? 3  -LR     Moving from lying on back to sitting on the side of a flat bed without bedrails? 3  -LR     Moving to and from a bed to a chair (including a wheelchair)? 3  -LR     Standing up from a chair using your arms (e.g., wheelchair, bedside chair)? 3  -LR     Climbing 3-5 steps with a railing? 3  -LR     To walk in hospital room? 3  -LR     AM-PAC 6 Clicks Score (PT) 18  -LR     Highest level of mobility 6 --> Walked 10 steps or more  -LR     Row Name 03/02/23 1409          PADD    Diagnosis 2  -LR     Gender 2  -LR     Age Group 1  -LR     Gait Distance 1  -LR     Assist Level 1  -LR     Home Support 3  -LR     PADD Score 9  -LR     Patient Preference home with home health  -LR     Prediction by PADD Score directly home (with home health or out-patient rehab)  -LR     Row Name 03/02/23 1409          Functional Assessment    Outcome Measure Options AM-PAC 6 Clicks Basic Mobility (PT);PADD  -LR           User Key  (r) = Recorded By, (t) = Taken By, (c) = Cosigned By    Initials Name Provider Type    Kait Vences, PT Physical Therapist                             Physical Therapy Education     Title: PT OT SLP Therapies (Done)     Topic: Physical Therapy (Done)     Point: Mobility training (Done)     Learning Progress Summary           Patient Acceptance, E,D,H, VU,NR by LR at 3/2/2023 1409    Comment: Issued/reviewed written/illustrated HEP. Educated on anterior hip precautions, weight bearing status, safety with mobility, correct supine to sit t/f technique, correct sit<->stand t/f technique, correct gait mechanics, and progression of POC.   Family Acceptance, E,D,H, VU,NR by LR at 3/2/2023 1402     Comment: Issued/reviewed written/illustrated HEP. Educated on anterior hip precautions, weight bearing status, safety with mobility, correct supine to sit t/f technique, correct sit<->stand t/f technique, correct gait mechanics, and progression of POC.   Significant Other Acceptance, E,D,H, VU,NR by LR at 3/2/2023 1409    Comment: Issued/reviewed written/illustrated HEP. Educated on anterior hip precautions, weight bearing status, safety with mobility, correct supine to sit t/f technique, correct sit<->stand t/f technique, correct gait mechanics, and progression of POC.                   Point: Home exercise program (Done)     Learning Progress Summary           Patient Acceptance, E,D,H, VU,NR by LR at 3/2/2023 1409    Comment: Issued/reviewed written/illustrated HEP. Educated on anterior hip precautions, weight bearing status, safety with mobility, correct supine to sit t/f technique, correct sit<->stand t/f technique, correct gait mechanics, and progression of POC.   Family Acceptance, E,D,H, VU,NR by LR at 3/2/2023 1409    Comment: Issued/reviewed written/illustrated HEP. Educated on anterior hip precautions, weight bearing status, safety with mobility, correct supine to sit t/f technique, correct sit<->stand t/f technique, correct gait mechanics, and progression of POC.   Significant Other Acceptance, E,D,H, VU,NR by LR at 3/2/2023 1409    Comment: Issued/reviewed written/illustrated HEP. Educated on anterior hip precautions, weight bearing status, safety with mobility, correct supine to sit t/f technique, correct sit<->stand t/f technique, correct gait mechanics, and progression of POC.                   Point: Body mechanics (Done)     Learning Progress Summary           Patient Acceptance, E,D,H, VU,NR by LR at 3/2/2023 1409    Comment: Issued/reviewed written/illustrated HEP. Educated on anterior hip precautions, weight bearing status, safety with mobility, correct supine to sit t/f technique, correct  sit<->stand t/f technique, correct gait mechanics, and progression of POC.   Family Acceptance, E,D,H, VU,NR by LR at 3/2/2023 1409    Comment: Issued/reviewed written/illustrated HEP. Educated on anterior hip precautions, weight bearing status, safety with mobility, correct supine to sit t/f technique, correct sit<->stand t/f technique, correct gait mechanics, and progression of POC.   Significant Other Acceptance, E,D,H, VU,NR by LR at 3/2/2023 1409    Comment: Issued/reviewed written/illustrated HEP. Educated on anterior hip precautions, weight bearing status, safety with mobility, correct supine to sit t/f technique, correct sit<->stand t/f technique, correct gait mechanics, and progression of POC.                   Point: Precautions (Done)     Learning Progress Summary           Patient Acceptance, E,D,H, VU,NR by LR at 3/2/2023 1409    Comment: Issued/reviewed written/illustrated HEP. Educated on anterior hip precautions, weight bearing status, safety with mobility, correct supine to sit t/f technique, correct sit<->stand t/f technique, correct gait mechanics, and progression of POC.   Family Acceptance, E,D,H, VU,NR by LR at 3/2/2023 1409    Comment: Issued/reviewed written/illustrated HEP. Educated on anterior hip precautions, weight bearing status, safety with mobility, correct supine to sit t/f technique, correct sit<->stand t/f technique, correct gait mechanics, and progression of POC.   Significant Other Acceptance, E,D,H, VU,NR by LR at 3/2/2023 1409    Comment: Issued/reviewed written/illustrated HEP. Educated on anterior hip precautions, weight bearing status, safety with mobility, correct supine to sit t/f technique, correct sit<->stand t/f technique, correct gait mechanics, and progression of POC.                               User Key     Initials Effective Dates Name Provider Type Discipline    LR 02/03/23 -  Kait Valderrama, PT Physical Therapist PT              PT Recommendation and  Plan  Planned Therapy Interventions (PT): balance training, bed mobility training, gait training, home exercise program, patient/family education, stair training, strengthening, ROM (range of motion), transfer training  Plan of Care Reviewed With: patient  Progress: improving  Outcome Evaluation: Patient ambulated 200 feet with rollator, CGAx2, step through gait pattern, limited by fatigue. CGAx2 to stand, SBA to t/f to EOB. Patient currently below baseline. Demonstrated decreased functional mobility status and decreased R hip strength/ROM. Will continue to address these deficits to promote return to PLOF. Recommend d/c home with family and HHPT at d/c.     Time Calculation:    PT Charges     Row Name 03/02/23 1409             Time Calculation    Start Time 1409  -LR      PT Received On 03/02/23  -LR      PT Goal Re-Cert Due Date 03/12/23  -LR         Timed Charges    83343 - PT Therapeutic Exercise Minutes 10  -LR      46882 - Gait Training Minutes  15  -LR         Untimed Charges    PT Eval/Re-eval Minutes 32  -LR         Total Minutes    Timed Charges Total Minutes 25  -LR      Untimed Charges Total Minutes 32  -LR       Total Minutes 57  -LR            User Key  (r) = Recorded By, (t) = Taken By, (c) = Cosigned By    Initials Name Provider Type    LR Kait Valderrama, PT Physical Therapist              Therapy Charges for Today     Code Description Service Date Service Provider Modifiers Qty    50014033834 HC PT THER PROC EA 15 MIN 3/2/2023 Kait Valderrama, PT GP 1    62628598399 HC GAIT TRAINING EA 15 MIN 3/2/2023 Kait Valderrama, PT GP 1    45779906530 HC PT THER SUPP EA 15 MIN 3/2/2023 Kait Valderrama, PT GP 4    96382153959 HC PT EVAL LOW COMPLEXITY 3 3/2/2023 Kait Valderrama, PT GP 1          PT G-Codes  Outcome Measure Options: AM-PAC 6 Clicks Basic Mobility (PT), PADD  AM-PAC 6 Clicks Score (PT): 18  PT Discharge Summary  Anticipated Discharge Disposition (PT): home with  assist, home with home health    Kait Valderrama, PT  3/2/2023

## 2023-03-02 NOTE — ADDENDUM NOTE
Addendum  created 03/02/23 1447 by Mina Portillo CRNA    Clinical Note Signed, Diagnosis association updated, Intraprocedure Blocks edited, Intraprocedure Meds edited

## 2023-03-02 NOTE — ANESTHESIA POSTPROCEDURE EVALUATION
Patient: Ted Saldivar    Procedure Summary     Date: 03/02/23 Room / Location:  [facility] /  [facility]    Anesthesia Start: 0728 Anesthesia Stop: 0954    Procedure: ANTERIOR TOTAL HIP ARTHROPLASTY EXETER CEMENTED RIGHT (Right: Hip) Diagnosis:       Arthritis of right hip      (Arthritis of right hip [3821986])    Surgeons: Richie Rogers MD Provider: Nelson Quach MD    Anesthesia Type: spinal ASA Status: 2          Anesthesia Type: spinal    Vitals  Vitals Value Taken Time   BP     Temp     Pulse 63 03/02/23 0953   Resp     SpO2 97 % 03/02/23 0953   Vitals shown include unvalidated device data.        Post Anesthesia Care and Evaluation    Patient location during evaluation: PACU  Patient participation: complete - patient participated  Level of consciousness: awake and alert  Pain score: 0  Pain management: adequate    Airway patency: patent  Anesthetic complications: No anesthetic complications  PONV Status: none  Cardiovascular status: hemodynamically stable and acceptable  Respiratory status: nonlabored ventilation, acceptable and nasal cannula  Hydration status: acceptable  Post Neuraxial Block status: No signs or symptoms of PDPH

## 2023-03-02 NOTE — ANESTHESIA PROCEDURE NOTES
Spinal Block      Patient reassessed immediately prior to procedure    Patient location during procedure: OR  Start Time: 3/2/2023 7:41 AM  Indication:at surgeon's request  Performed By  MELCHOR/HORTENSIA: Mina Portillo CRNASRNA: Lissett Arora SRNA  Preanesthetic Checklist  Completed: patient identified, IV checked, site marked, risks and benefits discussed, surgical consent, monitors and equipment checked, pre-op evaluation and timeout performed  Spinal Block Prep:  Patient Position:sitting  Sterile Tech:cap, gloves, sterile barriers and mask  Prep:Chloraprep  Patient Monitoring:blood pressure monitoring, continuous pulse oximetry and EKG    Spinal Block Procedure  Approach:midline  Guidance:landmark technique and palpation technique  Location:L3-L4  Needle Type:Quincke  Needle Gauge:22 G  Placement of Spinal needle event:cerebrospinal fluid aspirated  Paresthesia: no  Fluid Appearance:clear  Medications: Mepivacaine HCl (PF) (CARBOCAINE) 1.5 % injection - Injection   4 mL - 3/2/2023 7:41:00 AM   Post Assessment  Patient Tolerance:patient tolerated the procedure well with no apparent complications  Complications no  Additional Notes  Procedure:  Pt assisted to sitting position, with legs in position of comfort over side of bed.  Pt. instructed in optimal spine presentation, the spine was prepped/ Draped and the skin at insertion site was anesthetized with 1% Lidocaine 2 ml.  The spinal needle was then advanced until CSF flow was obtained and LA was injected:

## 2023-03-02 NOTE — PLAN OF CARE
Goal Outcome Evaluation:  Plan of Care Reviewed With: patient        Progress: improving  Outcome Evaluation: Patient ambulated 200 feet with rollator, CGAx2, step through gait pattern, limited by fatigue. CGAx2 to stand, SBA to t/f to EOB. Patient currently below baseline. Demonstrated decreased functional mobility status and decreased R hip strength/ROM. Will continue to address these deficits to promote return to PLOF. Recommend d/c home with family and HHPT at d/c.

## 2023-03-02 NOTE — ANESTHESIA PREPROCEDURE EVALUATION
Anesthesia Evaluation     Patient summary reviewed and Nursing notes reviewed                Airway   Mallampati: II  TM distance: >3 FB  Neck ROM: full  No difficulty expected  Dental - normal exam     Pulmonary - negative pulmonary ROS and normal exam   Cardiovascular - normal exam    (+) hypertension, hyperlipidemia,     ROS comment:   Incomplete LBBB    Neuro/Psych- negative ROS  GI/Hepatic/Renal/Endo - negative ROS     Musculoskeletal (-) negative ROS    Abdominal  - normal exam    Bowel sounds: normal.   Substance History - negative use     OB/GYN negative ob/gyn ROS         Other                      Anesthesia Plan    ASA 2     spinal     intravenous induction     Anesthetic plan, risks, benefits, and alternatives have been provided, discussed and informed consent has been obtained with: patient.    Plan discussed with CRNA.        CODE STATUS:

## 2023-03-02 NOTE — H&P
Patient Name: Ted Saldivar  MRN: [id number]  : 1940  DOS: 3/2/2023    Attending: Richie Rogers MD    Primary Care Provider: Sheila Natarajan MD      Chief complaint: Right hip pain    Subjective   Patient is a pleasant 82 y.o. male presented for scheduled surgery by Dr. Rogers.    Per his note (82 y.o. male who was admitted to [facility] on a progressive management of nonoperative treatment of hip osteoarthritis. Unfortunately patient progressed with significant pain and difficulty with ambulation and daily function.    The patient was indicated for a total hip arthroplasty. Likely risk benefits of the procedure including but not limited to infection, DVT, pulmonary embolism, leg length discrepancy, recurrent dislocation, possibility of injury to nerves and vessels, and periprosthetic fractures have been discussed with the patient. Despite the risks involved the patient elected to proceed with an informed consent was obtained.).    Patient underwent right total hip arthroplasty, anterior, under spinal anesthesia, tolerated surgery well, is admitted for further management.    Seen in PACU postop, doing well, good pain control, no complains of nausea, vomiting, or shortness of breath.    He has no history of DVT or PE.       Allergies   Allergen Reactions   • Plaquenil [Hydroxychloroquine] Other (See Comments)     Gait issues, wobbly, dizzy        Medications Prior to Admission   Medication Sig Dispense Refill Last Dose   • acetaminophen (TYLENOL) 500 MG tablet Take 1 tablet by mouth Every 6 (Six) Hours As Needed for Mild Pain.   3/1/2023 at 2100   • folic acid (FOLVITE) 1 MG tablet Take 1 tablet by mouth Daily. 90 tablet 1 3/1/2023 at 1000   • losartan (Cozaar) 50 MG tablet Take 1 tablet by mouth Daily. (Patient taking differently: Take 1 tablet by mouth Every Night.) 90 tablet 1 3/1/2023 at 2100   • simvastatin (ZOCOR) 10 MG tablet Take 1 tablet by mouth every night at bedtime. 90  tablet 1 3/1/2023 at 2100   • albuterol sulfate  (90 Base) MCG/ACT inhaler Inhale 1 puff Every 6 (Six) Hours As Needed for Wheezing. 18 g 3 More than a month   • meclizine (ANTIVERT) 25 MG tablet Take 1 tablet by mouth 3 (Three) Times a Day As Needed for dizziness. (Patient taking differently: Take 1 tablet by mouth As Needed for Dizziness.) 90 tablet 1 More than a month   • methotrexate 2.5 MG tablet Take 8 tablets by mouth 1 (One) Time Per Week. On thursday   More than a month   • sildenafil (Viagra) 100 MG tablet Take 1 tablet by mouth At Night As Needed for Erectile Dysfunction. 10 tablet 3 More than a month        Past Medical History:   Diagnosis Date   • Arthritis    • Hyperlipidemia    • Hypertension      Past Surgical History:   Procedure Laterality Date   • CATARACT EXTRACTION W/ INTRAOCULAR LENS IMPLANT Right 06/21/2021    Procedure: CATARACT PHACO EXTRACTION WITH INTRAOCULAR LENS IMPLANT RIGHT;  Surgeon: Gio Mcmanus MD;  Location: [facility] OR;  Service: Ophthalmology;  Laterality: Right;   • CATARACT EXTRACTION W/ INTRAOCULAR LENS IMPLANT Left 08/02/2021    Procedure: CATARACT PHACO EXTRACTION WITH INTRAOCULAR LENS IMPLANT LEFT complex with malyugin ring;  Surgeon: Gio Mcmanus MD;  Location: [facility] OR;  Service: Ophthalmology;  Laterality: Left;   • COLONOSCOPY     • HERNIA REPAIR Bilateral 1983   • ROTATOR CUFF REPAIR Right 2006   • TOTAL HIP ARTHROPLASTY Left 11/2018     Family History   Problem Relation Age of Onset   • Cancer Mother         lung   • Cancer Sister         leukemia     Social History     Tobacco Use   • Smoking status: Never   • Smokeless tobacco: Never   Vaping Use   • Vaping Use: Never used   Substance Use Topics   • Alcohol use: Not Currently   • Drug use: No   , retired.    Review of Systems  Pertinent items are noted in HPI, all other systems reviewed and negative    Vital Signs  /71   Pulse 63   Temp 97.3 °F (36.3 °C) (Temporal)   Resp 14    SpO2 100%     Physical Exam:    General Appearance:    Alert, cooperative, in no acute distress   Head:    Normocephalic, without obvious abnormality, atraumatic   Eyes:            Lids and lashes normal, conjunctivae and sclerae normal, no   icterus, no pallor, corneas clear    Ears:    Ears appear intact with no abnormalities noted   Throat:   No oral lesions, no thrush, oral mucosa moist   Neck:   No adenopathy, supple, trachea midline, no thyromegaly         Lungs:     Clear to auscultation,respirations regular, even and   unlabored. No wheezes or rales.    Heart:    Regular rhythm and normal rate, normal S1 and S2, no murmur, no gallop   Abdomen:     Normal bowel sounds, no masses, no organomegaly, soft        non-tender, non-distended, no guarding, no rebound                 tenderness   Genitalia:    Deferred   Extremities:  Right LE, CDI   Optifoam dressing hip.  No clubbing, cyanosis, or edema.    Pulses:   Pulses palpable and equal bilaterally   Skin:   No bleeding, bruising or rash   Neurologic:   Cranial nerves 2 - 12 grossly intact,  Intact flexion dorsiflexion bilateral feet      I reviewed the patient's new clinical results.             Invalid input(s): NEUTOPHILPCT,  EOSPCT        Invalid input(s): LABALBU, PROT  Lab Results   Component Value Date    HGBA1C 5.70 (H) 02/21/2023        Latest Reference Range & Units 02/21/23 14:55   Glucose 65 - 99 mg/dL 72   Sodium 136 - 145 mmol/L 141   Potassium 3.5 - 5.2 mmol/L 4.6   CO2 22.0 - 29.0 mmol/L 26.0   Chloride 98 - 107 mmol/L 106   Anion Gap 5.0 - 15.0 mmol/L 9.0   Creatinine 0.76 - 1.27 mg/dL 1.15   BUN 8 - 23 mg/dL 30 (H)   BUN/Creatinine Ratio 7.0 - 25.0  26.1 (H)   Calcium 8.6 - 10.5 mg/dL 9.1   eGFR >60.0 mL/min/1.73 63.9   Alkaline Phosphatase 39 - 117 U/L 82   Total Protein 6.0 - 8.5 g/dL 7.1   ALT (SGPT) 1 - 41 U/L 14   AST (SGOT) 1 - 40 U/L 22   Total Bilirubin 0.0 - 1.2 mg/dL 0.4   Albumin 3.5 - 5.2 g/dL 4.0   Globulin gm/dL 3.1   A/G Ratio  g/dL 1.3   Fructosamine 0 - 285 umol/L 250   (H): Data is abnormally high    Assessment and Plan:       Status post total hip replacement, right    Essential hypertension    Dyslipidemia    Arthritis of right hip      Plan:    1. PT/OT,  Weight bearing as tolerated right LE.  2. Pain control-prns  3. IS-encourage  4. DVT proph- Mechanicals and aspirin  5. Bowel regimen  6. Resume home medications as appropriate  7. Monitor post-op labs  8. DC planning for home    - Hypertension:  Resume home medications as appropriate, formulary substitution when indicated.  Holding parameters.  Prn medications for elevated blood pressure    -Dyslipidemia:  Resume home regimen statin ( formulary substitution when appropriate).        Dragon disclaimer:  Part of this encounter note is an electronic transcription/translation of spoken language to printed text. The electronic translation of spoken language may permit erroneous, or at times, nonsensical words or phrases to be inadvertently transcribed; Although I have reviewed the note for such errors, some may still exist.    Timbo Enriquez MD  03/02/23  11:42 EST

## 2023-03-02 NOTE — OP NOTE
TOTAL HIP ARTHROPLASTY ANTERIOR CEMENTED  Procedure Report    Patient Name:  Ted Saldivar  YOB: 1940    Date of Surgery:  3/2/2023     Indications:    82 y.o. male who was admitted to [facility] on a progressive management of nonoperative treatment of hip osteoarthritis. Unfortunately patient progressed with significant pain and difficulty with ambulation and daily function.    The patient was indicated for a total hip arthroplasty. Likely risk benefits of the procedure including but not limited to infection, DVT, pulmonary embolism, leg length discrepancy, recurrent dislocation, possibility of injury to nerves and vessels, and periprosthetic fractures have been discussed with the patient. Despite the risks involved the patient elected to proceed with an informed consent was obtained.     Patient Identification:  Patient was seen in the prep, consent was reviewed, operative procedure was identified, surgical site and thigh marked.        Pre-op Diagnosis:   Arthritis of right hip [8419252]       Post-Op Diagnosis Codes:     * Arthritis of right hip [M16.11]    Procedure/CPT® Codes:      Procedure(s):  ANTERIOR TOTAL HIP ARTHROPLASTY EXETER CEMENTED RIGHT    Staff:  Surgeon(s):  Richie Rogers MD    Anesthesia: Spinal    Estimated Blood Loss: 200 mL    Implants:    Implant Name Type Inv. Item Serial No.  Lot No. LRB No. Used Action   CMT BONE SIMPLEX/P TMYCIN FDOS 10PK - [id number] Implant CMT BONE SIMPLEX/P TMYCIN FDOS 10PK  [employer] [id number] Right 1 Implanted   CMT BONE SIMPLEX/P TMYCIN FDOS 10PK - [id number] Implant CMT BONE SIMPLEX/P TMYCIN FDOS 10PK  [employer] [id number] Right 1 Implanted   DEV CONTRL TISS STRATAFIX SYMM PDS PLUS CARLITOS CT-1 45CM - [id number] Implant DEV CONTRL TISS STRATAFIX SYMM PDS PLUS CARLITOS CT-1 45CM  [employer]  Right 1 Implanted   SUT NONABS MAXBRAID/PE NMBR2 HC5 38IN T [id number] - [id number] Implant SUT NONABS MAXBRAID/PE NMBR2  HC5 38IN T [id number]  [employer]  Right 2 Implanted   INSRT HIP TRIDENT X3 0DEG 36MM SZG STRL - [id number] Implant INSRT HIP TRIDENT X3 0DEG 36MM SZG STRL  DIAN ARIADNA [id number] Right 1 Implanted   SCRW HEX LP TRIDENT2 6.5X35MM - [id number] Implant SCRW HEX LP TRIDENT2 6.5X35MM  DIAN ARIADNA [id number] Right 1 Implanted   SHLL ACET TRIDENT2 TRITANIUM C/HL 60MM - [id number] Implant SHLL ACET TRIDENT2 TRITANIUM C/HL 60MM  DIAN ARIADNA [id number] Right 1 Implanted   STEM FEM/HIP EXETER V40 CMT SS OFFST/50MM SZ4 150MM - [id number] - [id number] Implant STEM FEM/HIP EXETER V40 CMT SS OFFST/50MM SZ4 150MM [id number] DIAN ARIADNA [id number] Right 1 Implanted   PLUG BONE IM FEM/HIP [id number] 14MM - [id number] Implant PLUG BONE IM FEM/HIP [id number] 14MM  DIAN ARIADNA [id number] Right 1 Implanted   HD FEM/HIP BIOLOX/DELTA V40 CERAM 36MM - [id number] Implant HD FEM/HIP BIOLOX/DELTA V40 CERAM 36MM  DIAN ARIADNA [id number] Right 1 Implanted       Specimen:          None      Findings: see dictation    Complications: none    Description of Procedure:   The patient was transferred to [facility] operating room and preoperative antibiotics given in form of Kefzol 2gm IV prior to skin incision as well as 1 g of tranexamic acid. Patient received Spinal anesthesia and was transferred to the [facility]. All bony prominences were padded adequately. The operative hip was then prepped and draped in the usual sterile fashion. Multiple timeouts were done identifying the correct patient , surgical site and planned procedure.    A Skin incision was made overlying the anterior lateral aspect of the hip for about 10 cm just below and lateral to the anterior superior iliac spine. Skin and subcutaneous tissue and fascia was incised in line with the skin incision overlying the tensor fascia melanie muscle. The tensor muscle was then retracted laterally and the interval between sartorius and rectus femoris medially and the tensor fascia  muscle were developed. The lateral femoral circumflex vessels were identified and ligated without difficulty. The deep fascia was incised and the capsule of the hip joint was identified. We then placed a soft tissue protecting device deep to the rectus and the tensor. Retractors were then placed extracapsular and the capsule was then incised in a T-shaped manner. The anterior posterior capsules were then tied with maxbraid suture . Following this retractors were placed intracapsularly. We did identify the obvious signs of severe osteoarthritis upon incising the capsule. We then made appropriate releases done on the inferior medial neck and along the saddle of the femoral neck. Femoral neck remaining was identified and osteotomy was done according to the preoperative templating with an oscillating saw. The femoral head and cut neck were extracted using a corkscrew without difficulty. The femoral head did have major signs of articular cartilage loss superior wear.    The acetabular cavity was exposed by placing retractors and taking care to protect the anterior vascular structures. The labrum was excised. The pulvinar was excised from the cotyloid fossa. Acetabular cavity was then progressively reamed maintaining the correct inclination and version under image intensifier control. Adequate medialization was obtained. Adequate fit was found to be obtained with the reamer size of 60. The dora T2 cup size 60 was then impacted into position. This found to seat satisfactorily with adequate inclination and anteversion. It was stable but we did insert one 6.5/35 mm lag screw. This was checked under fluoroscopy and found to be in good position. Following this the cup was cleaned and then a 36 neutral   impacted. Following this the periarticular tissues were then infiltrated with local anesthetic.    Attention was then directed to the proximal femur. The proximal femur was delivered using a trochanteric hook placed just  distal to the vastus tubercle and proximal to the gluteus margaret tendon. The leg was then externally rotated and gross traction released. Hyperextension and adduction was done using the [place] table. Mechanical lift on the table was utilized to support the femur. Appropriate capsular releases were made to expose the medial slope of the greater trochanter. Proximal femur was delivered and the femoral canal was then entered by removing lateral femoral neck with a box chisel by serial broaching. Adequate fit was found to be obtained with the size 50 N4 broach. We then trialed a std and neck was reduced. Fluoroscopic imaging was used to assess leg length and offset was found to be symmetric. The trials were then removed. A #50 N4 150 exeter cemented standard offset was implanted in appropriate anteversion. It sat very similar to our broach trial. We chose the std neck ceramic. This was then reduced again. Fluoroscopic images both lateral and AP of the femur showed the stem to be in good position. AP pelvis showed symmetric leg length and offset. The hip was then taken through a range of motion and found to be stable. There were no acute fractures. The wound was then thoroughly irrigated with saline. We did use of more of the injection cocktail. Betadine irrigation was used followed by 3L of saline irrigation. Soft tissue hemostasis was secured and topical TXA was used. Sponge, needle, and instrument counts were correct. Maxibraid sutures directly anterior and posterior capsular flaps were tied to each other and then closed the fascial layer with a running #2 STRATAFIX followed by 2-0 Vicryl and then Monocryl for the skin and skin affix. Once the skin affix had dried optifoam AG dressing placed over the top. The patient was then transferred to the recovery room in stable condition. The patient tolerated the procedure well. There were no medications. The patient had adequate distal pulses and good capillary refill.    The  patient will be mobilized by physical therapy. The patient received antibiotic prophylaxis postoperatively for 2 more doses given the first 24 hours. The patient was started on DVT prophylaxis with 81 mg aspirin twice daily starting postoperative day #1.    I discussed the satisfactory performance of the procedure with the patient's family and discussed the postoperative management.      Assistant Participation:  Surgeon(s):  Richie Rogers MD    Assistant: Johnie Connolly PA-C; Ad Skaggs PA-C assisted with proper preoperative positioning, preoperative templating, determingin availability of proper implants, prepping and draping of patient, manipulation placement of instruments, protection of ligaments and vital soft tissue structures, assistance in maintaining hemostasis and assistance with closure of the wound. Their skills and knowledge of the steps of operation and the desired outcome of each surgical step was crucial, allowing for efficient choreography of surgical procedure, and closure of the wound which lead to reduced surgical time, less blood loss, and less risk of complications for the patient.       Richie Rogers MD     Date: 3/2/2023  Time: 09:23 EST

## 2023-03-02 NOTE — INTERVAL H&P NOTE
Pre-Op H&P  Ted Saldivar  [id number]  1940    Full history and physical from office visit on 2/28/2023 is available and up-to-date.    Patient denies taking any anticoagulant or antiplatelet medications.    Review of Systems:  General ROS: negative for chills, fever or skin lesions;  No changes since last office visit.  Neg for recent sick exposure  Cardiovascular ROS: no chest pain or dyspnea on exertion  Respiratory ROS: no cough, shortness of breath, or wheezing    Allergies: Denies allergy to latex or contrast dye.  Allergies   Allergen Reactions    Plaquenil [Hydroxychloroquine] Other (See Comments)     Gait issues, wobbly, dizzy       Immunization History:  Influenza: Yes  Pneumococcal: Yes  Tetanus: No      Physical Exam:  General Appearance:    Alert, cooperative, no distress, appears stated age   Head:    Normocephalic, without obvious abnormality, atraumatic   Lungs:     Clear to auscultation bilaterally, respirations unlabored    Heart:   Regular rate and rhythm, S1 and S2 normal, no murmur, rub    or gallop    Abdomen:    Soft, nontender.  +bowel sounds   Breast Exam:    deferred   Genitalia:    deferred   Extremities:   Extremities normal, atraumatic, no cyanosis or edema   Skin:   Skin color, texture, turgor normal, no rashes or lesions   Neurologic:   Grossly intact   Results Review  LABS:  Lab Results   Component Value Date    WBC 9.93 02/21/2023    HGB 12.5 (L) 02/21/2023    HCT 38.3 02/21/2023    MCV 99.7 (H) 02/21/2023     02/21/2023    NEUTROABS 7.55 (H) 02/21/2023    GLUCOSE 72 02/21/2023    BUN 30 (H) 02/21/2023    CREATININE 1.15 02/21/2023    EGFRIFNONA 71 01/05/2022    EGFRIFAFRI 86 01/05/2022     02/21/2023    K 4.6 02/21/2023     02/21/2023    CO2 26.0 02/21/2023    CALCIUM 9.1 02/21/2023    ALBUMIN 4.0 02/21/2023    AST 22 02/21/2023    ALT 14 02/21/2023    BILITOT 0.4 02/21/2023    PTT 29.0 02/21/2023    INR 1.14 (H) 02/21/2023       RADIOLOGY:  No radiology  results for the last 3 days     I reviewed the patient's new clinical results.  CBC and CHEM profile from 2/21/2023 reviewed and available within patient's chart.  INR from this date was 1.14.  Reference range 0.84-1.13.    Cancer Staging (if applicable)  Cancer Patient: __ yes __no __unknown; If yes, clinical stage T:__ N:__M:__, stage group or __N/A    Impression: Patient presents with right hip pain.    Plan: Dr. Rogers will perform an anterior total hip arthroplasty exeter cemented right-RIGHT.       Johnie Connolly PA-C   03/02/23   6:45 AM EST

## 2023-03-02 NOTE — DISCHARGE INSTRUCTIONS
[facility] INCISION CARE Primary Hip and Knee:  You have a sterile dressing in place. Only exchange the dressing if it becomes saturated (fluid draining out the sides of dressing) and notify the office. The dressing is water resistant. During the first 14 days after surgery, it is ok to shower. DO NOT scrub on or around the dressing. Do not submerge the dressing.  After 14 days, you can remove your dressing. Your sutures are all underneath your skin. There is a layer of glue over the incision. DO NOT pick at this or try to peal it off. If the edges do peel up it is ok to trim them as needed. It is OK to shower with the incision exposed. DO NOT scrub on or around the incision. DO NOT submerge the incision in pools, baths, or hot tubs for 1 month after surgery.  No creams or ointments to the incision for 1 month after surgery. After 1 month, it is recommended to massage the scar with vitamin E cream to help decrease scar formation.  Check incision every day and notify surgeon immediately if any of the following signs or symptoms are noted:  Increase in redness  Increase in swelling around the incision and of the entire extremity  Increase in pain  Drainage oozing from the incision  Pulling apart of the edges of the incision  Increase in overall body temperature (greater than 100.5 degrees)    Anticoagulants: You will be discharged on an anticoagulant. This is a prophylactic medication that helps prevent blood clots during your post-operative period. Most patients will be on ***Aspirin 81 mg Enteric coated every 12 hours orally for 30 days. Some patients, due to increased risk factors, will need to be on a stronger anticoagulant. Dr. Rogers will discuss this need with you.   While taking the anticoagulant, you should avoid taking any additional aspirin, and limit ibuprofen (Advil or Motrin), Aleve (Naprosyn) or other non-steroidal anti-inflammatory medications.   Notify your surgeon immediately if any sharon bleeding is  noted in the urine, stool, vomit, or from the nose or the incision. Blood in the stool will often appear as black rather than red. Blood in urine may appear as pink. Blood in vomit may appear as brown/black like coffee grounds.  You will need to apply pressure for longer periods of time to any cuts or abrasions to stop bleeding  Avoid alcohol while taking anticoagulants  Sequential Compression Device: You maybe be discharged home with a compression device that helps promote blood flow and prevent clots in your legs. Wear these at all times for the first two weeks.   Mobilization: The best way to avoid a blood clot is to get up and walk. 10 times a day get up and walk for 5 minutes for the first two weeks. Walking for longer periods of time will increase pain and swelling, making therapy more difficult. If taking any long travel (car or plane) in the first 1-2 months, be sure to get up and walk at least every one hour.     Stool Softeners: You will be at greater risk of constipation after surgery because of being less mobile and taking the pain medications.   Take stool softeners as instructed by your surgeon while on pain medications. Use over the counter Colace 100 mg 1-2 capsules twice daily.   If stools become too loose or too frequent, decreases the dosage or stop the stool softener.  If constipation occurs despite use of stool softeners, you are to continue the stool softeners and add a laxative (Milk of Magnesia 1 ounce daily as needed).  Dulcolax oral tabs or suppository or a fleets enema can also be utilized for constipation and can be obtained over the counter.   If above interventions are unsuccessful in inducing bowel movements, please contact your family physician's office/surgeon's office.  Drink plenty of fluids and eat fruits and vegetables during your recovery time    Pain Medications utilized after surgery are narcotics and the law requires that the following information be given to all patients  that are prescribed narcotics:  CLASSIFICATION: Pain medications are called Opioids and are narcotics  LEGALITIES: It is illegal to share narcotics with others and to drive within 24 hours of taking narcotics  POTENTIAL SIDE EFFECTS: Potential side effects of opioids include: nausea, vomiting, itching, dizziness, drowsiness, dry mouth, constipation, and difficulty urinating.  POTENTIAL ADVERSE EFFECTS:   Opioid tolerance can develop with use of pain medications and this simply means that it requires more and more of the medication to control pain; however, this is seen more in patients that use Opioids for longer periods of time.  Opioid dependence can develop with use of Opioids and this simply means that to stop the medication can cause withdrawal symptoms; however, this is seen with patients that use Opioids for longer periods of time.  Opioid addiction can develop with use of Opioids and the incidence of this is very unlikely in patients who take the medications as ordered and stop the medications as instructed.  Opioid overdose can be dangerous, but is unlikely when the medication is taken as ordered and stopped when ordered. It is important not to mix opioids with alcohol or with any type of sedative, such as Benadryl, as this can lead to over sedation and respiratory difficulty.  DOSAGE:   Pain medications may be needed consistently for the first week to decrease pain and promote adequate pain relief and participation in physical therapy.  After the initial surgical pain begins to resolve, you may begin to decrease the pain medication and only take it as needed. By the end of 6 weeks, you should be off of pain medications.  You can decrease your pain medication consumption by slowly spacing out the time in between the medication, and using 650mg Tylenol when the pain is not as severe. Do not exceed 3500mg of Tylenol in 24 hours.   Refills will not be given by the office during evening hours, on weekends, or  after 6 weeks post-op.  To seek refills on pain medications during the initial 6 week post-operative period, you must call the office 48 hours in advance to request the refill. The office will then notify you when to  the prescription. DO NOT wait until you are out of the medication to request a refill.

## 2023-03-02 NOTE — CASE MANAGEMENT/SOCIAL WORK
Discharge Planning Assessment  [facility]     Patient Name: Ted Saldivar  MRN: [id number]  Today's Date: 3/2/2023    Admit Date: 3/2/2023    Plan: Home with [facility]   Discharge Needs Assessment     Row Name 03/02/23 1440       Living Environment    People in Home spouse    Name(s) of People in Home Akiko Saldivar (spouse) [phone]    Current Living Arrangements home    Primary Care Provided by self    Provides Primary Care For no one    Family Caregiver if Needed spouse       Resource/Environmental Concerns    Resource/Environmental Concerns none    Transportation Concerns none       Transition Planning    Patient/Family Anticipates Transition to home with family    Patient/Family Anticipated Services at Transition none    Transportation Anticipated family or friend will provide       Discharge Needs Assessment    Readmission Within the Last 30 Days no previous admission in last 30 days    Equipment Currently Used at Home rollator    Concerns to be Addressed denies needs/concerns at this time    Anticipated Changes Related to Illness none    Equipment Needed After Discharge none               Discharge Plan     Row Name 03/02/23 1446       Plan    Plan Home with [facility]    Patient/Family in Agreement with Plan yes    Plan Comments Spoke to patient at bedside. Lives with Akiko Saldivar (spouse) [phone] in [facility]. Is independent with ADL's. No problems with [facility] Medicare or medications. Has a rollator at home. Has advanced directives. PCP is Sheila Natarajan MD. Plan is home with [facility]. Wife will transport. CM will continue to follow.    Final Discharge Disposition Code 01 - home or self-care              Continued Care and Services - Admitted Since 3/2/2023    Coordination has not been started for this encounter.          Demographic Summary     Row Name 03/02/23 1434       General Information    Admission Type observation    Arrived From PACU/recovery room    Referral Source admission  list    Reason for Consult discharge planning    Preferred Language English       Contact Information    Permission Granted to Share Info With     Contact Information Obtained for                Functional Status     Row Name 03/02/23 1434       Functional Status    Usual Activity Tolerance moderate    Current Activity Tolerance moderate       Functional Status, IADL    Medications independent    Meal Preparation independent    Housekeeping independent    Laundry independent    Shopping independent       Mental Status    General Appearance WDL WDL       Mental Status Summary    Recent Changes in Mental Status/Cognitive Functioning no changes       Employment/    Employment Status retired               Psychosocial    No documentation.                Abuse/Neglect    No documentation.                Legal    No documentation.                Substance Abuse    No documentation.                Patient Forms    No documentation.                   Chad Nova, RN

## 2023-03-03 ENCOUNTER — READMISSION MANAGEMENT (OUTPATIENT)
Dept: CALL CENTER | Facility: HOSPITAL | Age: 83
End: 2023-03-03
Payer: MEDICARE

## 2023-03-03 VITALS
OXYGEN SATURATION: 97 % | RESPIRATION RATE: 16 BRPM | DIASTOLIC BLOOD PRESSURE: 58 MMHG | HEART RATE: 63 BPM | SYSTOLIC BLOOD PRESSURE: 99 MMHG | TEMPERATURE: 97.7 F

## 2023-03-03 PROBLEM — G89.18 POSTOPERATIVE PAIN: Status: ACTIVE | Noted: 2023-03-03

## 2023-03-03 LAB
ANION GAP SERPL CALCULATED.3IONS-SCNC: 8 MMOL/L (ref 5–15)
BUN SERPL-MCNC: 30 MG/DL (ref 8–23)
BUN/CREAT SERPL: 29.4 (ref 7–25)
CALCIUM SPEC-SCNC: 8.8 MG/DL (ref 8.6–10.5)
CHLORIDE SERPL-SCNC: 105 MMOL/L (ref 98–107)
CO2 SERPL-SCNC: 24 MMOL/L (ref 22–29)
CREAT SERPL-MCNC: 1.02 MG/DL (ref 0.76–1.27)
EGFRCR SERPLBLD CKD-EPI 2021: 73.8 ML/MIN/1.73
GLUCOSE SERPL-MCNC: 128 MG/DL (ref 65–99)
HCT VFR BLD AUTO: 31.3 % (ref 37.5–51)
HGB BLD-MCNC: 10.2 G/DL (ref 13–17.7)
POTASSIUM SERPL-SCNC: 5.5 MMOL/L (ref 3.5–5.2)
SODIUM SERPL-SCNC: 137 MMOL/L (ref 136–145)

## 2023-03-03 PROCEDURE — G0378 HOSPITAL OBSERVATION PER HR: HCPCS

## 2023-03-03 PROCEDURE — 97110 THERAPEUTIC EXERCISES: CPT

## 2023-03-03 PROCEDURE — 85014 HEMATOCRIT: CPT | Performed by: ORTHOPAEDIC SURGERY

## 2023-03-03 PROCEDURE — 80048 BASIC METABOLIC PNL TOTAL CA: CPT | Performed by: ORTHOPAEDIC SURGERY

## 2023-03-03 PROCEDURE — 85018 HEMOGLOBIN: CPT | Performed by: ORTHOPAEDIC SURGERY

## 2023-03-03 PROCEDURE — 97116 GAIT TRAINING THERAPY: CPT

## 2023-03-03 RX ORDER — CEFADROXIL 500 MG/1
500 CAPSULE ORAL 2 TIMES DAILY
Refills: 0
Start: 2023-03-03

## 2023-03-03 RX ORDER — ASPIRIN 81 MG/1
81 TABLET, DELAYED RELEASE ORAL EVERY 12 HOURS
Qty: 84 TABLET | Refills: 0
Start: 2023-03-03 | End: 2023-04-14

## 2023-03-03 RX ORDER — LOSARTAN POTASSIUM 50 MG/1
50 TABLET ORAL NIGHTLY
Start: 2023-03-06

## 2023-03-03 RX ORDER — ACETAMINOPHEN 500 MG
1000 TABLET ORAL EVERY 8 HOURS
Qty: 42 TABLET | Refills: 0
Start: 2023-03-03 | End: 2023-03-10

## 2023-03-03 RX ORDER — OXYCODONE HYDROCHLORIDE 5 MG/1
5 TABLET ORAL EVERY 4 HOURS PRN
Refills: 0
Start: 2023-03-03

## 2023-03-03 RX ORDER — DOCUSATE SODIUM 100 MG/1
100 CAPSULE, LIQUID FILLED ORAL 2 TIMES DAILY
Start: 2023-03-03

## 2023-03-03 RX ORDER — TRAMADOL HYDROCHLORIDE 50 MG/1
50 TABLET ORAL EVERY 6 HOURS PRN
Start: 2023-03-03

## 2023-03-03 RX ORDER — ONDANSETRON 4 MG/1
4 TABLET, FILM COATED ORAL EVERY 8 HOURS PRN
Start: 2023-03-03

## 2023-03-03 RX ORDER — MELOXICAM 15 MG/1
15 TABLET ORAL DAILY
Qty: 15 TABLET | Refills: 0
Start: 2023-03-03 | End: 2023-03-18

## 2023-03-03 RX ADMIN — MELOXICAM 15 MG: 15 TABLET ORAL at 08:57

## 2023-03-03 RX ADMIN — ASPIRIN 81 MG: 81 TABLET, COATED ORAL at 08:57

## 2023-03-03 RX ADMIN — ACETAMINOPHEN 1000 MG: 500 TABLET ORAL at 05:38

## 2023-03-03 NOTE — PLAN OF CARE
Problem: Adult Inpatient Plan of Care  Goal: Plan of Care Review  Outcome: Met  Goal: Patient-Specific Goal (Individualized)  Outcome: Met  Goal: Absence of Hospital-Acquired Illness or Injury  Outcome: Met  Intervention: Identify and Manage Fall Risk  Recent Flowsheet Documentation  Taken 3/3/2023 1000 by Lissett Haskins RN  Safety Promotion/Fall Prevention:   safety round/check completed   assistive device/personal items within reach   fall prevention program maintained  Taken 3/3/2023 0800 by Lissett Haskins RN  Safety Promotion/Fall Prevention:   safety round/check completed   assistive device/personal items within reach   clutter free environment maintained   fall prevention program maintained  Intervention: Prevent Skin Injury  Recent Flowsheet Documentation  Taken 3/3/2023 1000 by Lissett Haskins RN  Body Position: legs elevated  Taken 3/3/2023 0800 by Lissett Haskins RN  Body Position: legs elevated  Intervention: Prevent and Manage VTE (Venous Thromboembolism) Risk  Recent Flowsheet Documentation  Taken 3/3/2023 1000 by Lissett Haskins RN  Activity Management: up in chair  Taken 3/3/2023 0800 by Lissett Haskins RN  Activity Management: up in chair  VTE Prevention/Management:   bilateral   sequential compression devices on  Intervention: Prevent Infection  Recent Flowsheet Documentation  Taken 3/3/2023 1000 by Lissett Haskins RN  Infection Prevention:   rest/sleep promoted   single patient room provided  Taken 3/3/2023 0800 by Lissett Haskins RN  Infection Prevention:   single patient room provided   environmental surveillance performed  Goal: Optimal Comfort and Wellbeing  Outcome: Met  Intervention: Provide Person-Centered Care  Recent Flowsheet Documentation  Taken 3/3/2023 0800 by Lissett Haskins RN  Trust Relationship/Rapport:   care explained   questions answered   questions encouraged  Goal: Readiness for Transition of Care  Outcome: Met   Goal Outcome Evaluation:

## 2023-03-03 NOTE — OUTREACH NOTE
Prep Survey    Flowsheet Row Responses   [facility] facility patient discharged from? [place]   Is LACE score < 7 ? Yes   Eligibility [facility]   Date of Admission 03/02/23   Date of Discharge 03/03/23   Discharge Disposition Home or Self Care   Discharge diagnosis ANTERIOR TOTAL HIP ARTHROPLASTY EXETER CEMENTED RIGHT   Does the patient have one of the following disease processes/diagnoses(primary or secondary)? Total Joint Replacement   Does the patient have Home health ordered? No   Is there a DME ordered? No   Prep survey completed? Yes          Lilian BROWER - Registered Nurse

## 2023-03-03 NOTE — PROGRESS NOTES
Orthopedic Progress Note      Patient: Ted Saldivar  YOB: 1940     Date of Admission: 3/2/2023  6:14 AM Medical Record Number: [id number]     Attending Physician: Richie Rogers MD    Status Post:  Procedure(s):  ANTERIOR TOTAL HIP ARTHROPLASTY EXETER CEMENTED RIGHT Post Operative Day Number: 1    Subjective : No new orthopaedic complaints     Pain Relief: some relief with present medication.     Systemic Complaints: No Complaints  Vitals:    03/02/23 2015 03/02/23 2328 03/03/23 0445 03/03/23 0705   BP: 105/59 117/61 91/59 99/58   BP Location: Right arm Right arm Right arm Right arm   Patient Position: Lying Sitting Sitting Lying   Pulse: 68 76 56 63   Resp: 16 16 16 16   Temp: 97.7 °F (36.5 °C) 97.8 °F (36.6 °C) 97.7 °F (36.5 °C) 97.7 °F (36.5 °C)   TempSrc: Oral Oral Oral Oral   SpO2: 91% 98% 95% 97%       Physical Exam: 82 y.o. male    General Appearance:       Alert, cooperative, in no acute distress                  Extremities:    Dressing Clean, Dry and Intact             No clinical sign of DVT        Able to do good movements of digits    Pulses:   Pulses palpable and equal bilaterally           Diagnostic Tests:     Results from last 7 days   Lab Units 03/03/23  0505   HEMOGLOBIN g/dL 10.2*   HEMATOCRIT % 31.3*     Results from last 7 days   Lab Units 03/03/23  0505   SODIUM mmol/L 137   POTASSIUM mmol/L 5.5*   CHLORIDE mmol/L 105   CO2 mmol/L 24.0   BUN mg/dL 30*   CREATININE mg/dL 1.02   GLUCOSE mg/dL 128*   CALCIUM mg/dL 8.8         No results found for: URICACID  No results found for: CRYSTAL  Microbiology Results (last 10 days)     Procedure Component Value - Date/Time    MRSA Screen Culture (Outpatient) - Swab, Nares [[id number]]  (Normal) Collected: 02/21/23 1455    Lab Status: Final result Specimen: Swab from Nares Updated: 02/23/23 1253     MRSA Screen Cx No Methicillin Resistant Staphylococcus aureus isolated    Narrative:      The negative predictive value of this diagnostic  test is high and should only be used to consider de-escalating anti-MRSA therapy. A positive result may indicate colonization with MRSA and must be correlated clinically.  The negative predictive value of this diagnostic test is high and should only be used to consider de-escalating anti-MRSA therapy. A positive result may indicate colonization with MRSA and must be correlated clinically.        FL C Arm During Surgery    Result Date: 3/2/2023  Impression: Fluoroscopic imaging obtained without a radiologist present. Please see performing provider notes for full detail. Electronically Signed: Omar Alvarado  3/2/2023 3:15 PM EST  Workstation ID: [id number]    XR Hip With or Without Pelvis 2 - 3 View Right    Result Date: 3/2/2023  Impression: Expected postoperative changes of right total hip arthroplasty. No evidence of immediate hardware complication. Electronically Signed: Jose Marie  3/2/2023 10:40 AM EST  Workstation ID: [id number]        Current Medications:  Scheduled Meds:acetaminophen, 1,000 mg, Oral, Q8H  aspirin, 81 mg, Oral, Q12H  atorvastatin, 10 mg, Oral, Nightly  losartan, 50 mg, Oral, Nightly  meloxicam, 15 mg, Oral, Daily      Continuous Infusions:lactated ringers, 9 mL/hr, Last Rate: 9 mL/hr (03/02/23 0914)  lactated ringers, 100 mL/hr, Last Rate: 100 mL/hr (03/02/23 1350)      PRN Meds:.•  albuterol  •  HYDROmorphone **AND** naloxone  •  ketorolac  •  labetalol  •  oxyCODONE  •  oxyCODONE  •  sodium chloride  •  traMADol    Assessment: Status post  [place] ARTHRP ACETBLR/PROX FEM PROSTC AGRFT/ALGRFT [62249] (ANTERIOR TOTAL HIP ARTHROPLASTY EXETER CEMENTED RIGHT)    Patient Active Problem List   Diagnosis   • Neoplasm of skin   • Generalized abdominal pain   • Acute infective gastroenteritis   • Essential hypertension   • Dyslipidemia   • Nuclear sclerotic cataract of right eye   • Arthritis of right hip   • Status post total hip replacement, right       PLAN:   Continues current post-op  course  Anticoagulation: Aspirin started  Mobilize with PT as tolerated per protocol    Weight Bearing: WBAT  Discharge Plan: OK to plan for discharge in  today to home  from orthopaedic perspective.    Richie Rogers MD    Date: 3/3/2023    Time: 08:34 EST

## 2023-03-03 NOTE — DISCHARGE SUMMARY
Patient Name: Ted Saldivar  MRN: [id number]  : 1940  DOS: 3/3/2023    Attending: Richie Rogers MD;Marcello*    Primary Care Provider: Sheila Natarajan MD    Date of Admission:.3/2/2023  6:14 AM    Date of Discharge:  3/3/2023    Discharge Diagnosis:   Status post total hip replacement, right    Essential hypertension    Dyslipidemia    Arthritis of right hip    Postoperative pain      Hospital Course  At admit:  Patient is a pleasant 82 y.o. male presented for scheduled surgery by Dr. Rogers.     Per his note (82 y.o. male who was admitted to [facility] on a progressive management of nonoperative treatment of hip osteoarthritis. Unfortunately patient progressed with significant pain and difficulty with ambulation and daily function.    The patient was indicated for a total hip arthroplasty. Likely risk benefits of the procedure including but not limited to infection, DVT, pulmonary embolism, leg length discrepancy, recurrent dislocation, possibility of injury to nerves and vessels, and periprosthetic fractures have been discussed with the patient. Despite the risks involved the patient elected to proceed with an informed consent was obtained.).     Patient underwent right total hip arthroplasty, anterior, under spinal anesthesia, tolerated surgery well, is admitted for further management.     Seen in PACU postop, doing well, good pain control, no complains of nausea, vomiting, or shortness of breath.     He has no history of DVT or PE.     After admit  Patient was provided pain medications as needed for pain control.    Adjustments were made to pain medications to optimize postop pain management when indicated. Risks and benefits of opiate medications discussed with patient. TOO report was reviewed.    He was seen by PT  and has progressed well over his stay.    He used an IS for atelectasis prophylaxis and ASA along with mechanicals for DVT prophylaxis.    Home medications were  resumed as appropriate, and labs were monitored and remained fairly stable.     With the progress he has made,  is ready for DC home today.      Discussed with patient regarding plan and he shows understanding and agreement.    Patient will have HHPT following discharge.        Procedures Performed  Procedure(s):  ANTERIOR TOTAL HIP ARTHROPLASTY EXETER CEMENTED RIGHT       Pertinent Test Results:    I reviewed the patient's new clinical results.   Results from last 7 days   Lab Units 23  0505   HEMOGLOBIN g/dL 10.2*   HEMATOCRIT % 31.3*     Results from last 7 days   Lab Units 23  0505   SODIUM mmol/L 137   POTASSIUM mmol/L 5.5*   CHLORIDE mmol/L 105   CO2 mmol/L 24.0   BUN mg/dL 30*   CREATININE mg/dL 1.02   CALCIUM mg/dL 8.8   GLUCOSE mg/dL 128*     I reviewed the patient's new imaging including images and reports.      Physical therapy  Progress: improving  Outcome Evaluation: Pt increased ambulation to 400' with rollator and CGAx1. Anterior hip precautions reviewed, pt verbalized understanding. No LOB or knee buckling. HEP performed. Pt continues to be limited by decreased strength and decreased ROM causing functional mobility below baseline. Pt will benefit from further IPPT for addressing these deficits. Pt ready for d/c home with assist and HHPT from functional mobility standpoint.                  Discharge Assessment:      Visit Vitals  BP 99/58 (BP Location: Right arm, Patient Position: Lying)   Pulse 63   Temp 97.7 °F (36.5 °C) (Oral)   Resp 16   SpO2 97%     Temp (24hrs), Av.7 °F (36.5 °C), Min:97.4 °F (36.3 °C), Max:98.1 °F (36.7 °C)      General Appearance:    Alert, cooperative, in no acute distress   Lungs:     Clear to auscultation,respirations regular, even and                   unlabored    Heart:    Regular rhythm and normal rate, normal S1 and S2   Abdomen:     Normal bowel sounds, no masses, no organomegaly, soft        non-tender, non-distended, no guarding, no rebound                  tenderness   Extremities:   CDI dressing over hip incision/optifoam   Pulses:   Pulses palpable and equal bilaterally   Skin:   No bleeding, bruising or rash   Neurologic:   Cranial nerves 2 - 12 grossly intact, sensation intact, Flexion and dorsiflexion intact bilateral feet.         Discharge Disposition:home.          Discharge Medications      New Medications      Instructions Start Date   aspirin 81 MG EC tablet   81 mg, Oral, Every 12 Hours      cefadroxil 500 MG capsule  Commonly known as: DURICEF   500 mg, Oral, 2 Times Daily      docusate sodium 100 MG capsule  Commonly known as: Colace   100 mg, Oral, 2 Times Daily      meloxicam 15 MG tablet  Commonly known as: MOBIC   15 mg, Oral, Daily      ondansetron 4 MG tablet  Commonly known as: Zofran   4 mg, Oral, Every 8 Hours PRN      oxyCODONE 5 MG immediate release tablet  Commonly known as: Roxicodone   5 mg, Oral, Every 4 Hours PRN      traMADol 50 MG tablet  Commonly known as: ULTRAM   50 mg, Oral, Every 6 Hours PRN         Changes to Medications      Instructions Start Date   acetaminophen 500 MG tablet  Commonly known as: TYLENOL  What changed:   · how much to take  · when to take this  · reasons to take this  · additional instructions   1,000 mg, Oral, Every 8 Hours, Take every 8 hours  as needed after 1 week      losartan 50 MG tablet  Commonly known as: Cozaar  What changed:   · when to take this  · These instructions start on March 6, 2023. If you are unsure what to do until then, ask your doctor or other care provider.   50 mg, Oral, Nightly   Start Date: March 6, 2023     meclizine 25 MG tablet  Commonly known as: ANTIVERT  What changed: when to take this   25 mg, Oral, 3 Times Daily PRN      methotrexate 2.5 MG tablet  What changed: These instructions start on March 17, 2023. If you are unsure what to do until then, ask your doctor or other care provider.   20 mg, Oral, Weekly, On thursday   Start Date: March 17, 2023         Continue These Medications      Instructions Start Date   albuterol sulfate  (90 Base) MCG/ACT inhaler  Commonly known as: PROVENTIL HFA;VENTOLIN HFA;PROAIR HFA   1 puff, Inhalation, Every 6 Hours PRN      folic acid 1 MG tablet  Commonly known as: FOLVITE   1 mg, Oral, Daily      sildenafil 100 MG tablet  Commonly known as: Viagra   100 mg, Oral, Nightly PRN      simvastatin 10 MG tablet  Commonly known as: ZOCOR   10 mg, Oral, Every Night at Bedtime             Discharge Diet:   Resume prior.     Activity at Discharge:  Weight bearing as tolerated       Future Appointments   Date Time Provider Department Center   6/8/2023  9:45 AM Sheila Natarajan MD [facility] disclaimer:  Part of this encounter note is an electronic transcription/translation of spoken language to printed text. The electronic translation of spoken language may permit erroneous, or at times, nonsensical words or phrases to be inadvertently transcribed; Although I have reviewed the note for such errors, some may still exist.       Timbo Enriquez MD  03/03/23  11:22 EST

## 2023-03-03 NOTE — CASE MANAGEMENT/SOCIAL WORK
Continued Stay Note  [facility]     Patient Name: Ted Saldivar  MRN: [id number]  Today's Date: 3/3/2023    Admit Date: 3/2/2023    Plan: Home   Discharge Plan     Row Name 03/03/23 1111       Plan    Plan Home    Plan Comments Discharge plan is home with [facility].  Wife to transport. CM will continue to follow.    Final Discharge Disposition Code 01 - home or self-care               Discharge Codes    No documentation.                     Marisela Heredia RN

## 2023-03-03 NOTE — THERAPY TREATMENT NOTE
Patient Name: Ted Saldivar  : 1940    MRN: [id number]                              Today's Date: 3/3/2023       Admit Date: 3/2/2023    Visit Dx: No diagnosis found.  Patient Active Problem List   Diagnosis   • Neoplasm of skin   • Generalized abdominal pain   • Acute infective gastroenteritis   • Essential hypertension   • Dyslipidemia   • Nuclear sclerotic cataract of right eye   • Arthritis of right hip   • Status post total hip replacement, right     Past Medical History:   Diagnosis Date   • Arthritis    • Hyperlipidemia    • Hypertension      Past Surgical History:   Procedure Laterality Date   • CATARACT EXTRACTION W/ INTRAOCULAR LENS IMPLANT Right 2021    Procedure: CATARACT PHACO EXTRACTION WITH INTRAOCULAR LENS IMPLANT RIGHT;  Surgeon: Gio Mcmanus MD;  Location: [facility];  Service: Ophthalmology;  Laterality: Right;   • CATARACT EXTRACTION W/ INTRAOCULAR LENS IMPLANT Left 2021    Procedure: CATARACT PHACO EXTRACTION WITH INTRAOCULAR LENS IMPLANT LEFT complex with malyugin ring;  Surgeon: Gio Mcmanus MD;  Location: [facility];  Service: Ophthalmology;  Laterality: Left;   • COLONOSCOPY     • HERNIA REPAIR Bilateral    • ROTATOR CUFF REPAIR Right    • TOTAL HIP ARTHROPLASTY Left 2018      General Information     Row Name 23          Physical Therapy Time and Intention    Document Type therapy note (daily note)  -AB     Mode of Treatment physical therapy  -AB     Row Name 23          General Information    Patient Profile Reviewed yes  -AB     Existing Precautions/Restrictions fall;right;hip, anterior  -AB     Barriers to Rehab previous functional deficit  -AB     Row Name 23          Cognition    Orientation Status (Cognition) oriented x 4  -AB     Row Name 23          Safety Issues, Functional Mobility    Safety Issues Affecting Function (Mobility) insight into deficits/self-awareness;awareness of need for  assistance;safety precaution awareness  -AB     Impairments Affecting Function (Mobility) strength;pain;endurance/activity tolerance;range of motion (ROM)  -AB           User Key  (r) = Recorded By, (t) = Taken By, (c) = Cosigned By    Initials Name Provider Type    AB Lissett Montero, PT Physical Therapist               Mobility     Row Name 03/03/23 0926          Bed Mobility    Comment, (Bed Mobility) Pt received sitting EOB.  -AB     Row Name 03/03/23 0926          Transfers    Comment, (Transfers) VCs for hand placement and sequencing. Ambulated with rollator.  -AB     Row Name 03/03/23 0926          Bed-Chair Transfer    Bed-Chair [place] (Transfers) not tested  -AB     Row Name 03/03/23 0926          Sit-Stand Transfer    Sit-Stand [place] (Transfers) contact guard;1 person assist;verbal cues  -AB     Assistive Device (Sit-Stand Transfers) other (see comments)  rollator  -AB     Row Name 03/03/23 0926          Gait/Stairs (Locomotion)    [place] Level (Gait) verbal cues;contact guard;1 person assist  -AB     Assistive Device (Gait) other (see comments)  rollator  -AB     Distance in Feet (Gait) 400  -AB     Deviations/Abnormal Patterns (Gait) bilateral deviations;fahad decreased;gait speed decreased;stride length decreased;right sided deviations;antalgic  -AB     Bilateral Gait Deviations forward flexed posture;heel strike decreased  -AB     Right Sided Gait Deviations weight shift ability decreased  -AB     [place] Level (Stairs) not tested  -AB     Comment, (Gait/Stairs) Pt ambulated with step through gait pattern, rollator, and CGAx1. Pt demo's forward flexed posture and decreased stride length. Cues for relaxing shoulders and increased weight acceptance onto RLE. Gait mechanics improved with cues. No LOB or knee buckling. Further gait limited by fatigue.  -AB     Row Name 03/03/23 0926          Mobility    Extremity Weight-bearing Status right lower extremity  -AB     Right Lower  Extremity (Weight-bearing Status) weight-bearing as tolerated (WBAT)  -AB           User Key  (r) = Recorded By, (t) = Taken By, (c) = Cosigned By    Initials Name Provider Type    AB Lissett Montero PT Physical Therapist               Obj/Interventions     Row Name 03/03/23 0930          Motor Skills    Therapeutic Exercise hip;knee;ankle  -AB     Row Name 03/03/23 0930          Hip (Therapeutic Exercise)    Hip (Therapeutic Exercise) strengthening exercise;isometric exercises  -AB     Hip Isometrics (Therapeutic Exercise) bilateral;gluteal sets;10 repetitions  -AB     Hip Strengthening (Therapeutic Exercise) right;aBduction;10 repetitions  -AB     Row Name 03/03/23 0930          Knee (Therapeutic Exercise)    Knee (Therapeutic Exercise) strengthening exercise;isometric exercises  -AB     Knee Isometrics (Therapeutic Exercise) right;quad sets;10 repetitions  -AB     Knee Strengthening (Therapeutic Exercise) right;heel slides;LAQ (long arc quad);10 repetitions  -AB     Row Name 03/03/23 0930          Ankle (Therapeutic Exercise)    Ankle (Therapeutic Exercise) AROM (active range of motion)  -AB     Ankle AROM (Therapeutic Exercise) bilateral;dorsiflexion;plantarflexion;10 repetitions  -AB     Row Name 03/03/23 0930          Balance    Balance Assessment sitting static balance;sitting dynamic balance;standing static balance;standing dynamic balance  -AB     Static Sitting Balance standby assist  -AB     Dynamic Sitting Balance standby assist  -AB     Position, Sitting Balance unsupported;sitting in chair  -AB     Static Standing Balance contact guard;1-person assist  -AB     Dynamic Standing Balance contact guard;1-person assist;verbal cues  -AB     Position/Device Used, Standing Balance supported;walker, 4-wheeled  -AB     Balance Interventions sitting;standing;sit to stand;supported;static;dynamic  -AB     Comment, Balance No LOB or knee buckling.  -AB           User Key  (r) = Recorded By, (t) = Taken By, (c) =  Cosigned By    Initials Name Provider Type    AB Lissett Montero, PT Physical Therapist               Goals/Plan    No documentation.                Clinical Impression     Row Name 03/03/23 0931          Pain    Pretreatment Pain Rating 1/10  -AB     Posttreatment Pain Rating 1/10  -AB     Pain Location - Side/Orientation Right  -AB     Pain Location incisional  -AB     Pain Location - hip  -AB     Pre/Posttreatment Pain Comment Tolerated.  -AB     Pain Intervention(s) Ambulation/increased activity;Repositioned;Cold applied  -AB     Additional Documentation Pain Scale: Numbers Pre/Post-Treatment (Group)  -AB     Row Name 03/03/23 0931          Plan of Care Review    Plan of Care Reviewed With patient  -AB     Progress improving  -AB     Outcome Evaluation Pt increased ambulation to 400' with rollator and CGAx1. Anterior hip precautions reviewed, pt verbalized understanding. No LOB or knee buckling. HEP performed. Pt continues to be limited by decreased strength and decreased ROM causing functional mobility below baseline. Pt will benefit from further IPPT for addressing these deficits. Pt ready for d/c home with assist and HHPT from functional mobility standpoint.  -AB     Row Name 03/03/23 0931          Vital Signs    Pre Systolic BP Rehab 99  -AB     Pre Treatment Diastolic BP 58  -AB     Intra Systolic BP Rehab 111  -AB     Intra Treatment Diastolic BP 55  -AB     Post Systolic BP Rehab 151  -AB     Post Treatment Diastolic BP 69  -AB     Pretreatment Heart Rate (beats/min) 67  -AB     Posttreatment Heart Rate (beats/min) 70  -AB     Pre SpO2 (%) 96  -AB     O2 Delivery Pre Treatment room air  -AB     O2 Delivery Intra Treatment room air  -AB     Post SpO2 (%) 98  -AB     O2 Delivery Post Treatment room air  -AB     Pre Patient Position Sitting  -AB     Intra Patient Position Standing  -AB     Post Patient Position Sitting  -AB     Row Name 03/03/23 0931          Positioning and Restraints    Pre-Treatment  Position sitting in chair/recliner  -AB     Post Treatment Position chair  -AB     In Chair notified nsg;reclined;sitting;call light within reach;encouraged to call for assist;exit alarm on;compression device;RLE elevated;LLE elevated;legs elevated  -AB           User Key  (r) = Recorded By, (t) = Taken By, (c) = Cosigned By    Initials Name Provider Type    AB Lissett Montero PT Physical Therapist               Outcome Measures     Row Name 03/03/23 0938          How much help from another person do you currently need...    Turning from your back to your side while in flat bed without using bedrails? 3  -AB     Moving from lying on back to sitting on the side of a flat bed without bedrails? 3  -AB     Moving to and from a bed to a chair (including a wheelchair)? 3  -AB     Standing up from a chair using your arms (e.g., wheelchair, bedside chair)? 3  -AB     Climbing 3-5 steps with a railing? 3  -AB     To walk in hospital room? 3  -AB     AM-PAC 6 Clicks Score (PT) 18  -AB     Highest level of mobility 6 --> Walked 10 steps or more  -AB     Row Name 03/03/23 0938          Functional Assessment    Outcome Measure Options AM-PAC 6 Clicks Basic Mobility (PT)  -AB           User Key  (r) = Recorded By, (t) = Taken By, (c) = Cosigned By    Initials Name Provider Type    Lissett Wasserman PT Physical Therapist                             Physical Therapy Education     Title: PT OT SLP Therapies (Done)     Topic: Physical Therapy (Done)     Point: Mobility training (Done)     Learning Progress Summary           Patient Acceptance, E,D, VU,DU by AB at 3/3/2023 0939    Acceptance, E,D,H, VU,NR by LR at 3/2/2023 1409    Comment: Issued/reviewed written/illustrated HEP. Educated on anterior hip precautions, weight bearing status, safety with mobility, correct supine to sit t/f technique, correct sit<->stand t/f technique, correct gait mechanics, and progression of POC.   Family Acceptance, E,D,H, VU,NR by LR at 3/2/2023  1409    Comment: Issued/reviewed written/illustrated HEP. Educated on anterior hip precautions, weight bearing status, safety with mobility, correct supine to sit t/f technique, correct sit<->stand t/f technique, correct gait mechanics, and progression of POC.   Significant Other Acceptance, E,D,H, VU,NR by LR at 3/2/2023 1409    Comment: Issued/reviewed written/illustrated HEP. Educated on anterior hip precautions, weight bearing status, safety with mobility, correct supine to sit t/f technique, correct sit<->stand t/f technique, correct gait mechanics, and progression of POC.                   Point: Home exercise program (Done)     Learning Progress Summary           Patient Acceptance, E,D, VU,DU by AB at 3/3/2023 0939    Acceptance, E,D,H, VU,NR by LR at 3/2/2023 1409    Comment: Issued/reviewed written/illustrated HEP. Educated on anterior hip precautions, weight bearing status, safety with mobility, correct supine to sit t/f technique, correct sit<->stand t/f technique, correct gait mechanics, and progression of POC.   Family Acceptance, E,D,H, VU,NR by LR at 3/2/2023 1409    Comment: Issued/reviewed written/illustrated HEP. Educated on anterior hip precautions, weight bearing status, safety with mobility, correct supine to sit t/f technique, correct sit<->stand t/f technique, correct gait mechanics, and progression of POC.   Significant Other Acceptance, E,D,H, VU,NR by LR at 3/2/2023 1409    Comment: Issued/reviewed written/illustrated HEP. Educated on anterior hip precautions, weight bearing status, safety with mobility, correct supine to sit t/f technique, correct sit<->stand t/f technique, correct gait mechanics, and progression of POC.                   Point: Body mechanics (Done)     Learning Progress Summary           Patient Acceptance, E,D, VU,DU by AB at 3/3/2023 0939    Acceptance, E,D,H, VU,NR by LR at 3/2/2023 1409    Comment: Issued/reviewed written/illustrated HEP. Educated on anterior hip  precautions, weight bearing status, safety with mobility, correct supine to sit t/f technique, correct sit<->stand t/f technique, correct gait mechanics, and progression of POC.   Family Acceptance, E,D,H, VU,NR by LR at 3/2/2023 1409    Comment: Issued/reviewed written/illustrated HEP. Educated on anterior hip precautions, weight bearing status, safety with mobility, correct supine to sit t/f technique, correct sit<->stand t/f technique, correct gait mechanics, and progression of POC.   Significant Other Acceptance, E,D,H, VU,NR by LR at 3/2/2023 1409    Comment: Issued/reviewed written/illustrated HEP. Educated on anterior hip precautions, weight bearing status, safety with mobility, correct supine to sit t/f technique, correct sit<->stand t/f technique, correct gait mechanics, and progression of POC.                   Point: Precautions (Done)     Learning Progress Summary           Patient Acceptance, E,D, VU,DU by AB at 3/3/2023 0939    Acceptance, E,D,H, VU,NR by LR at 3/2/2023 1409    Comment: Issued/reviewed written/illustrated HEP. Educated on anterior hip precautions, weight bearing status, safety with mobility, correct supine to sit t/f technique, correct sit<->stand t/f technique, correct gait mechanics, and progression of POC.   Family Acceptance, E,D,H, VU,NR by LR at 3/2/2023 1409    Comment: Issued/reviewed written/illustrated HEP. Educated on anterior hip precautions, weight bearing status, safety with mobility, correct supine to sit t/f technique, correct sit<->stand t/f technique, correct gait mechanics, and progression of POC.   Significant Other Acceptance, E,D,H, VU,NR by LR at 3/2/2023 1409    Comment: Issued/reviewed written/illustrated HEP. Educated on anterior hip precautions, weight bearing status, safety with mobility, correct supine to sit t/f technique, correct sit<->stand t/f technique, correct gait mechanics, and progression of POC.                               User Key     Initials  Effective Dates Name Provider Type Discipline    LR 02/03/23 -  Kait Valderrama, PT Physical Therapist PT    AB 09/22/22 -  Lissett Montero, PT Physical Therapist PT              PT Recommendation and Plan     Plan of Care Reviewed With: patient  Progress: improving  Outcome Evaluation: Pt increased ambulation to 400' with rollator and CGAx1. Anterior hip precautions reviewed, pt verbalized understanding. No LOB or knee buckling. HEP performed. Pt continues to be limited by decreased strength and decreased ROM causing functional mobility below baseline. Pt will benefit from further IPPT for addressing these deficits. Pt ready for d/c home with assist and HHPT from functional mobility standpoint.     Time Calculation:    PT Charges     Row Name 03/03/23 0939             Time Calculation    Start Time 0900  -AB      PT Received On 03/03/23  -AB      PT Goal Re-Cert Due Date 03/12/23  -AB         Timed Charges    54789 - PT Therapeutic Exercise Minutes 10  -AB      64955 - Gait Training Minutes  15  -AB         Total Minutes    Timed Charges Total Minutes 25  -AB       Total Minutes 25  -AB            User Key  (r) = Recorded By, (t) = Taken By, (c) = Cosigned By    Initials Name Provider Type    AB Lissett Montero, PT Physical Therapist              Therapy Charges for Today     Code Description Service Date Service Provider Modifiers Qty    48382963591 HC PT THER PROC EA 15 MIN 3/3/2023 Lissett Montero, PT GP 1    75824810679 HC GAIT TRAINING EA 15 MIN 3/3/2023 Lissett Montero, PT GP 1          PT G-Codes  Outcome Measure Options: AM-PAC 6 Clicks Basic Mobility (PT)  AM-PAC 6 Clicks Score (PT): 18  PT Discharge Summary  Anticipated Discharge Disposition (PT): home with assist, home with home health    Lissett Montero PT  3/3/2023

## 2023-03-03 NOTE — PLAN OF CARE
Goal Outcome Evaluation:  Plan of Care Reviewed With: patient        Progress: improving  Outcome Evaluation: Pt increased ambulation to 400' with rollator and CGAx1. Anterior hip precautions reviewed, pt verbalized understanding. No LOB or knee buckling. HEP performed. Pt continues to be limited by decreased strength and decreased ROM causing functional mobility below baseline. Pt will benefit from further IPPT for addressing these deficits. Pt ready for d/c home with assist and HHPT from functional mobility standpoint.

## 2023-03-06 ENCOUNTER — TRANSITIONAL CARE MANAGEMENT TELEPHONE ENCOUNTER (OUTPATIENT)
Dept: CALL CENTER | Facility: HOSPITAL | Age: 83
End: 2023-03-06
Payer: MEDICARE

## 2023-03-06 NOTE — OUTREACH NOTE
Call Center TCM Note    Flowsheet Row Responses   [facility] patient discharged from? [place]   Does the patient have one of the following disease processes/diagnoses(primary or secondary)? Total Joint Replacement   Joint surgery performed? Hip   TCM attempt successful? Yes   Call start time 1559   Call end time 1602   Has the patient been back in either the hospital or Emergency Department since discharge? No   Discharge diagnosis ANTERIOR TOTAL HIP ARTHROPLASTY EXETER CEMENTED RIGHT   Does the patient have all medications related to this admission filled (includes all antibiotics, pain medications, etc.) Yes   Is the patient taking all medications as directed (includes completed medication regime)? Yes   Is the patient able to teach back alternate methods of pain control? Ice, Reposition, Correct alignment, Short, frequent activity   Does the patient have an appointment with their PCP within 7 days of discharge? No   Nursing Interventions Patient declined scheduling/rescheduling appointment at this time   Has home health visited the patient within 72 hours of discharge? Yes   Psychosocial issues? No   Has the patient began therapy sessions (either in the home or as an out patient)? Yes   Has the patient fallen since discharge? No   Did the patient receive a copy of their discharge instructions? Yes   Nursing interventions Reviewed instructions with patient   What is the patient's perception of their functional status since discharge? Improving   Is the patient able to teach back signs and symptoms of infection? Temp >100.4 for 24h or longer, Incisional drainage, Blisters around incision, Increased swelling or redness around incision (not associated with surgical edema), Severe discomfort or pain, Changes in mobility, Shortness of breath or chest pain   Is the patient able to teach back how to prevent infection? Check incision daily, Wash hands before and after touching incision, Keep incision covered if  drainage, Shower only as directed by surgeon, Eat well-balanced diet, No tub baths, hot tub or swimming   Is the patient able to teach back signs and symptoms of DVT? Redness in calf, Swelling in calf, Severe pain in calf, Area hot to touch, Shortness of breath or chest pain   Is the patient able to teach back home safety measures? Ability to shower, Accessibility to necessary areas in home, Modifications to reach items, Modifications with ADLs such as dressing, cooking, toileting   If the patient is a current smoker, are they able to teach back resources for cessation? Not a smoker   TCM call completed? Yes   Call end time 1602   Would this patient benefit from a Referral to [facility] Social Work? No   Is the patient interested in additional calls from an ambulatory ?  NOTE:  applies to high risk patients requiring additional follow-up. No          Makenna Keith LPN    3/6/2023, 16:05 EST

## 2023-03-06 NOTE — OUTREACH NOTE
Call Center TCM Note    Flowsheet Row Responses   [facility] patient discharged from? [place]   Does the patient have one of the following disease processes/diagnoses(primary or secondary)? Total Joint Replacement   Joint surgery performed? Hip   TCM attempt successful? No   Unsuccessful attempts Attempt 1          Makenna Keith LPN    3/6/2023, 08:11 EST

## 2023-06-08 ENCOUNTER — OFFICE VISIT (OUTPATIENT)
Dept: INTERNAL MEDICINE | Facility: CLINIC | Age: 83
End: 2023-06-08
Payer: MEDICARE

## 2023-06-08 VITALS
DIASTOLIC BLOOD PRESSURE: 84 MMHG | TEMPERATURE: 97.4 F | WEIGHT: 190 LBS | BODY MASS INDEX: 27.2 KG/M2 | HEART RATE: 55 BPM | HEIGHT: 70 IN | SYSTOLIC BLOOD PRESSURE: 157 MMHG | RESPIRATION RATE: 16 BRPM | OXYGEN SATURATION: 100 %

## 2023-06-08 DIAGNOSIS — M06.9 RHEUMATOID ARTHRITIS INVOLVING MULTIPLE SITES, UNSPECIFIED WHETHER RHEUMATOID FACTOR PRESENT: ICD-10-CM

## 2023-06-08 DIAGNOSIS — I10 BENIGN ESSENTIAL HYPERTENSION: Primary | ICD-10-CM

## 2023-06-08 DIAGNOSIS — E87.5 HYPERKALEMIA: ICD-10-CM

## 2023-06-08 DIAGNOSIS — E78.2 MIXED HYPERLIPIDEMIA: ICD-10-CM

## 2023-06-08 LAB
ALBUMIN SERPL-MCNC: 4.2 G/DL (ref 3.5–5.2)
ALBUMIN/GLOB SERPL: 1.7 G/DL
ALP SERPL-CCNC: 75 U/L (ref 39–117)
ALT SERPL-CCNC: 15 U/L (ref 1–41)
AST SERPL-CCNC: 16 U/L (ref 1–40)
BASOPHILS # BLD AUTO: 0.07 10*3/MM3 (ref 0–0.2)
BASOPHILS NFR BLD AUTO: 0.9 % (ref 0–1.5)
BILIRUB SERPL-MCNC: 0.5 MG/DL (ref 0–1.2)
BUN SERPL-MCNC: 24 MG/DL (ref 8–23)
BUN/CREAT SERPL: 24.5 (ref 7–25)
CALCIUM SERPL-MCNC: 9.9 MG/DL (ref 8.6–10.5)
CHLORIDE SERPL-SCNC: 108 MMOL/L (ref 98–107)
CHOLEST SERPL-MCNC: 174 MG/DL (ref 0–200)
CO2 SERPL-SCNC: 27.5 MMOL/L (ref 22–29)
CREAT SERPL-MCNC: 0.98 MG/DL (ref 0.76–1.27)
EGFRCR SERPLBLD CKD-EPI 2021: 77.5 ML/MIN/1.73
EOSINOPHIL # BLD AUTO: 0.13 10*3/MM3 (ref 0–0.4)
EOSINOPHIL NFR BLD AUTO: 1.7 % (ref 0.3–6.2)
ERYTHROCYTE [DISTWIDTH] IN BLOOD BY AUTOMATED COUNT: 14.6 % (ref 12.3–15.4)
GLOBULIN SER CALC-MCNC: 2.5 GM/DL
GLUCOSE SERPL-MCNC: 90 MG/DL (ref 65–99)
HCT VFR BLD AUTO: 39.1 % (ref 37.5–51)
HDLC SERPL-MCNC: 59 MG/DL (ref 40–60)
HGB BLD-MCNC: 12.8 G/DL (ref 13–17.7)
IMM GRANULOCYTES # BLD AUTO: 0.02 10*3/MM3 (ref 0–0.05)
IMM GRANULOCYTES NFR BLD AUTO: 0.3 % (ref 0–0.5)
LDLC SERPL CALC-MCNC: 104 MG/DL (ref 0–100)
LYMPHOCYTES # BLD AUTO: 1.23 10*3/MM3 (ref 0.7–3.1)
LYMPHOCYTES NFR BLD AUTO: 15.9 % (ref 19.6–45.3)
MCH RBC QN AUTO: 31.8 PG (ref 26.6–33)
MCHC RBC AUTO-ENTMCNC: 32.7 G/DL (ref 31.5–35.7)
MCV RBC AUTO: 97.3 FL (ref 79–97)
MONOCYTES # BLD AUTO: 0.69 10*3/MM3 (ref 0.1–0.9)
MONOCYTES NFR BLD AUTO: 8.9 % (ref 5–12)
NEUTROPHILS # BLD AUTO: 5.6 10*3/MM3 (ref 1.7–7)
NEUTROPHILS NFR BLD AUTO: 72.3 % (ref 42.7–76)
NRBC BLD AUTO-RTO: 0 /100 WBC (ref 0–0.2)
PLATELET # BLD AUTO: 245 10*3/MM3 (ref 140–450)
POTASSIUM SERPL-SCNC: 5.2 MMOL/L (ref 3.5–5.2)
PROT SERPL-MCNC: 6.7 G/DL (ref 6–8.5)
RBC # BLD AUTO: 4.02 10*6/MM3 (ref 4.14–5.8)
SODIUM SERPL-SCNC: 142 MMOL/L (ref 136–145)
TRIGL SERPL-MCNC: 53 MG/DL (ref 0–150)
VLDLC SERPL CALC-MCNC: 11 MG/DL (ref 5–40)
WBC # BLD AUTO: 7.74 10*3/MM3 (ref 3.4–10.8)

## 2023-06-08 PROCEDURE — 3077F SYST BP >= 140 MM HG: CPT | Performed by: INTERNAL MEDICINE

## 2023-06-08 PROCEDURE — 1160F RVW MEDS BY RX/DR IN RCRD: CPT | Performed by: INTERNAL MEDICINE

## 2023-06-08 PROCEDURE — 99214 OFFICE O/P EST MOD 30 MIN: CPT | Performed by: INTERNAL MEDICINE

## 2023-06-08 PROCEDURE — 3079F DIAST BP 80-89 MM HG: CPT | Performed by: INTERNAL MEDICINE

## 2023-06-08 PROCEDURE — 1159F MED LIST DOCD IN RCRD: CPT | Performed by: INTERNAL MEDICINE

## 2023-06-08 RX ORDER — SIMVASTATIN 10 MG
10 TABLET ORAL
Qty: 90 TABLET | Refills: 1 | Status: SHIPPED | OUTPATIENT
Start: 2023-06-08

## 2023-06-08 RX ORDER — ADALIMUMAB 40MG/0.4ML
40 KIT SUBCUTANEOUS
COMMUNITY

## 2023-06-08 RX ORDER — LOSARTAN POTASSIUM 50 MG/1
50 TABLET ORAL NIGHTLY
Start: 2023-06-08

## 2023-06-08 NOTE — PROGRESS NOTES
"Chief Complaint  Hypertension (fasting) and Hyperlipidemia    Subjective        Ted Saldivar presents to [facility]  History of Present Illness  HPI: Patient is here to follow up on the blood pressure  The patient is taking the blood pressure medications as prescribed and has had no side effects. The patient is also here to follow up on the cholesterol and  is  due to get lab work done .  The patient also needs refills on medications .  Patient also complains of rheumatoid arthritis for which he sees rheumatologist and monitor  Hyperlipidemia   Pertinent negatives include no chest pain or shortness of breath.   Hypertension   Pertinent negatives include no chest pain, palpitations or shortness of breath.    Objective   Vital Signs:  /84   Pulse 55   Temp 97.4 °F (36.3 °C)   Resp 16   Ht 177.8 cm (70\")   Wt 86.2 kg (190 lb)   SpO2 100%   BMI 27.26 kg/m²   Estimated body mass index is 27.26 kg/m² as calculated from the following:    Height as of this encounter: 177.8 cm (70\").    Weight as of this encounter: 86.2 kg (190 lb).           Physical Exam  Vitals and nursing note reviewed.   Constitutional:       General: He is not in acute distress.     Appearance: Normal appearance. He is not diaphoretic.   HENT:      Head: Normocephalic and atraumatic.      Right Ear: External ear normal.      Left Ear: External ear normal.      Nose: Nose normal.   Eyes:      Extraocular Movements: Extraocular movements intact.      Conjunctiva/sclera: Conjunctivae normal.   Neck:      Trachea: Trachea normal.   Cardiovascular:      Rate and Rhythm: Normal rate and regular rhythm.      Heart sounds: Normal heart sounds.   Pulmonary:      Effort: Pulmonary effort is normal. No respiratory distress.   Abdominal:      General: Abdomen is flat.   Musculoskeletal:      Cervical back: Neck supple.      Comments: Moves all limbs   Skin:     General: Skin is warm and dry.      Findings: No erythema. "   Neurological:      Mental Status: He is alert and oriented to person, place, and time.      Comments: No gross motor or sensory deficits      Result Review :  The following data was reviewed by: Sheila Natarajan MD on 06/08/2023:  Common labs          2/1/2023    09:48 2/21/2023    14:55 3/3/2023    05:05   Common Labs   Glucose 97  72  128    BUN 22  30  30    Creatinine 1.12  1.15  1.02    Sodium 143  141  137    Potassium 5.4  4.6  5.5    Chloride 105  106  105    Calcium 10.0  9.1  8.8    Total Protein 6.7      Albumin 4.3  4.0     Total Bilirubin 0.7  0.4     Alkaline Phosphatase 80  82     AST (SGOT) 18  22     ALT (SGPT) 13  14     WBC 13.89  9.93     Hemoglobin 13.1  12.5  10.2    Hematocrit 38.6  38.3  31.3    Platelets 287  292     Total Cholesterol 211      Triglycerides 79      HDL Cholesterol 51      LDL Cholesterol  146      Hemoglobin A1C  5.70                    Assessment and Plan   Diagnoses and all orders for this visit:    1. Benign essential hypertension (Primary)  -     losartan (Cozaar) 50 MG tablet; Take 1 tablet by mouth Every Night.    2. Mixed hyperlipidemia  -     CBC & Differential  -     Comprehensive Metabolic Panel  -     Lipid Panel  -     simvastatin (ZOCOR) 10 MG tablet; Take 1 tablet by mouth every night at bedtime.  Dispense: 90 tablet; Refill: 1    3. Hyperkalemia    4. Rheumatoid arthritis involving multiple sites, unspecified whether rheumatoid factor present      Plan:  1.  Benign essential hypertension: Will continue current medication, low-sodium diet advised, Counseled to regularly check BP at home with goal averaging <130/80.   2.mixed hyperlipidemia: will obtain   fasting CMP and lipid panel.  Diet and exercise counseled,  Will continue current medications  3.  Hyperkalemia: low potassium diet sheet provided, will monitor labs  4. Rheumatoid arthritis : per rheumatology , single pen of humira 40mg  subcut in the left upper quadrant of abdomen administered by nurse today  in clinic per patient prior to examination by me, procedure was uneventful       I spent 30 minutes caring for Ted on this date of service. This time includes time spent by me in the following activities:preparing for the visit, reviewing tests, performing a medically appropriate examination and/or evaluation , counseling and educating the patient/family/caregiver, ordering medications, tests, or procedures, and documenting information in the medical record  Follow Up   Return in about 3 months (around 9/13/2023).  Patient was given instructions and counseling regarding his condition or for health maintenance advice. Please see specific information pulled into the AVS if appropriate.

## 2023-08-08 DIAGNOSIS — I10 BENIGN ESSENTIAL HYPERTENSION: ICD-10-CM

## 2023-08-08 RX ORDER — LOSARTAN POTASSIUM 50 MG/1
TABLET ORAL
Qty: 90 TABLET | Refills: 1 | Status: SHIPPED | OUTPATIENT
Start: 2023-08-08

## 2023-08-08 NOTE — TELEPHONE ENCOUNTER
Rx Refill Note  Requested Prescriptions     Pending Prescriptions Disp Refills    losartan (COZAAR) 50 MG tablet [Pharmacy Med Name: Losartan Potassium 50 MG Oral Tablet] 90 tablet 3     Sig: TAKE 1 TABLET BY MOUTH DAILY      Last office visit with prescribing clinician: 6/8/2023   Last telemedicine visit with prescribing clinician: Visit date not found   Next office visit with prescribing clinician: 9/28/2023                         Adele Estes MA  08/08/23, 13:30 EDT

## 2023-09-17 ENCOUNTER — APPOINTMENT (OUTPATIENT)
Dept: GENERAL RADIOLOGY | Facility: HOSPITAL | Age: 83
End: 2023-09-17
Payer: MEDICARE

## 2023-09-17 ENCOUNTER — HOSPITAL ENCOUNTER (EMERGENCY)
Facility: HOSPITAL | Age: 83
Discharge: HOME | End: 2023-09-17
Attending: EMERGENCY MEDICINE | Admitting: EMERGENCY MEDICINE

## 2023-09-17 ENCOUNTER — HOSPITAL ENCOUNTER (EMERGENCY)
Facility: HOSPITAL | Age: 83
Discharge: HOME | End: 2023-09-17
Attending: EMERGENCY MEDICINE | Admitting: EMERGENCY MEDICINE
Payer: MEDICARE

## 2023-09-17 VITALS
DIASTOLIC BLOOD PRESSURE: 107 MMHG | HEIGHT: 70 IN | SYSTOLIC BLOOD PRESSURE: 153 MMHG | HEART RATE: 77 BPM | WEIGHT: 190 LBS | RESPIRATION RATE: 19 BRPM | TEMPERATURE: 97.6 F | BODY MASS INDEX: 27.2 KG/M2 | OXYGEN SATURATION: 97 %

## 2023-09-17 VITALS
BODY MASS INDEX: 27.2 KG/M2 | SYSTOLIC BLOOD PRESSURE: 144 MMHG | DIASTOLIC BLOOD PRESSURE: 80 MMHG | HEART RATE: 60 BPM | OXYGEN SATURATION: 100 % | RESPIRATION RATE: 15 BRPM | HEIGHT: 70 IN | TEMPERATURE: 98.3 F | WEIGHT: 190 LBS

## 2023-09-17 DIAGNOSIS — R04.0 EPISTAXIS: ICD-10-CM

## 2023-09-17 DIAGNOSIS — R07.9 CHEST PAIN, UNSPECIFIED TYPE: Primary | ICD-10-CM

## 2023-09-17 DIAGNOSIS — R04.0 NOSEBLEED: Primary | ICD-10-CM

## 2023-09-17 LAB
ALBUMIN SERPL-MCNC: 4.4 G/DL (ref 3.5–5.2)
ALBUMIN/GLOB SERPL: 1.7 G/DL
ALP SERPL-CCNC: 70 U/L (ref 39–117)
ALT SERPL W P-5'-P-CCNC: 14 U/L (ref 1–41)
ANION GAP SERPL CALCULATED.3IONS-SCNC: 10.8 MMOL/L (ref 5–15)
AST SERPL-CCNC: 21 U/L (ref 1–40)
BASOPHILS # BLD AUTO: 0.04 10*3/MM3 (ref 0–0.2)
BASOPHILS NFR BLD AUTO: 0.5 % (ref 0–1.5)
BILIRUB SERPL-MCNC: 0.8 MG/DL (ref 0–1.2)
BUN SERPL-MCNC: 33 MG/DL (ref 8–23)
BUN/CREAT SERPL: 29.7 (ref 7–25)
CALCIUM SPEC-SCNC: 9.2 MG/DL (ref 8.6–10.5)
CHLORIDE SERPL-SCNC: 103 MMOL/L (ref 98–107)
CO2 SERPL-SCNC: 23.2 MMOL/L (ref 22–29)
CREAT SERPL-MCNC: 1.11 MG/DL (ref 0.76–1.27)
DEPRECATED RDW RBC AUTO: 61.1 FL (ref 37–54)
EGFRCR SERPLBLD CKD-EPI 2021: 66.3 ML/MIN/1.73
EOSINOPHIL # BLD AUTO: 0.04 10*3/MM3 (ref 0–0.4)
EOSINOPHIL NFR BLD AUTO: 0.5 % (ref 0.3–6.2)
ERYTHROCYTE [DISTWIDTH] IN BLOOD BY AUTOMATED COUNT: 17.1 % (ref 12.3–15.4)
GEN 5 2HR TROPONIN T REFLEX: 30 NG/L
GLOBULIN UR ELPH-MCNC: 2.6 GM/DL
GLUCOSE SERPL-MCNC: 180 MG/DL (ref 65–99)
HCT VFR BLD AUTO: 37.9 % (ref 37.5–51)
HGB BLD-MCNC: 12.7 G/DL (ref 13–17.7)
HOLD SPECIMEN: NORMAL
HOLD SPECIMEN: NORMAL
IMM GRANULOCYTES # BLD AUTO: 0.05 10*3/MM3 (ref 0–0.05)
IMM GRANULOCYTES NFR BLD AUTO: 0.6 % (ref 0–0.5)
LYMPHOCYTES # BLD AUTO: 1.55 10*3/MM3 (ref 0.7–3.1)
LYMPHOCYTES NFR BLD AUTO: 18.2 % (ref 19.6–45.3)
MCH RBC QN AUTO: 32.8 PG (ref 26.6–33)
MCHC RBC AUTO-ENTMCNC: 33.5 G/DL (ref 31.5–35.7)
MCV RBC AUTO: 97.9 FL (ref 79–97)
MONOCYTES # BLD AUTO: 0.5 10*3/MM3 (ref 0.1–0.9)
MONOCYTES NFR BLD AUTO: 5.9 % (ref 5–12)
NEUTROPHILS NFR BLD AUTO: 6.34 10*3/MM3 (ref 1.7–7)
NEUTROPHILS NFR BLD AUTO: 74.3 % (ref 42.7–76)
NRBC BLD AUTO-RTO: 0 /100 WBC (ref 0–0.2)
PLATELET # BLD AUTO: 268 10*3/MM3 (ref 140–450)
PMV BLD AUTO: 10.3 FL (ref 6–12)
POTASSIUM SERPL-SCNC: 4.6 MMOL/L (ref 3.5–5.2)
PROT SERPL-MCNC: 7 G/DL (ref 6–8.5)
RBC # BLD AUTO: 3.87 10*6/MM3 (ref 4.14–5.8)
SODIUM SERPL-SCNC: 137 MMOL/L (ref 136–145)
TROPONIN T DELTA: -2 NG/L
TROPONIN T SERPL HS-MCNC: 32 NG/L
WBC NRBC COR # BLD: 8.52 10*3/MM3 (ref 3.4–10.8)
WHOLE BLOOD HOLD COAG: NORMAL
WHOLE BLOOD HOLD SPECIMEN: NORMAL

## 2023-09-17 PROCEDURE — 93005 ELECTROCARDIOGRAM TRACING: CPT

## 2023-09-17 PROCEDURE — 80053 COMPREHEN METABOLIC PANEL: CPT

## 2023-09-17 PROCEDURE — 71045 X-RAY EXAM CHEST 1 VIEW: CPT

## 2023-09-17 PROCEDURE — 99283 EMERGENCY DEPT VISIT LOW MDM: CPT

## 2023-09-17 PROCEDURE — 85025 COMPLETE CBC W/AUTO DIFF WBC: CPT

## 2023-09-17 PROCEDURE — 99284 EMERGENCY DEPT VISIT MOD MDM: CPT

## 2023-09-17 PROCEDURE — 36415 COLL VENOUS BLD VENIPUNCTURE: CPT

## 2023-09-17 PROCEDURE — 84484 ASSAY OF TROPONIN QUANT: CPT

## 2023-09-17 RX ORDER — SODIUM CHLORIDE 0.9 % (FLUSH) 0.9 %
10 SYRINGE (ML) INJECTION AS NEEDED
Status: DISCONTINUED | OUTPATIENT
Start: 2023-09-17 | End: 2023-09-17 | Stop reason: HOSPADM

## 2023-09-17 RX ORDER — UPADACITINIB 15 MG/1
TABLET, EXTENDED RELEASE ORAL
COMMUNITY

## 2023-09-17 RX ADMIN — PHENYLEPHRINE HYDROCHLORIDE 2 SPRAY: 0.5 SPRAY NASAL at 09:18

## 2023-09-17 NOTE — ED PROVIDER NOTES
HPI: Ted Saldivar is a 83 y.o. male who presents to the emergency department complaining of nosebleed.  Patient states that just prior to arrival his nose started bleeding.  He states it was pouring from both nostrils.  Bleeding has now stopped.  He has no other complaints.  No trauma.  He is not on any sort of blood thinner.      REVIEW OF SYSTEMS: All other systems reviewed and are negative     PAST MEDICAL HISTORY:   Past Medical History:   Diagnosis Date    Arthritis     Hyperlipidemia     Hypertension         FAMILY HISTORY:   Family History   Problem Relation Age of Onset    Cancer Mother         lung    Cancer Sister         leukemia        SOCIAL HISTORY:   Social History     Socioeconomic History    Marital status:    Tobacco Use    Smoking status: Never    Smokeless tobacco: Never   Vaping Use    Vaping Use: Never used   Substance and Sexual Activity    Alcohol use: Not Currently    Drug use: No    Sexual activity: Defer        SURGICAL HISTORY:   Past Surgical History:   Procedure Laterality Date    CATARACT EXTRACTION W/ INTRAOCULAR LENS IMPLANT Right 06/21/2021    Procedure: CATARACT PHACO EXTRACTION WITH INTRAOCULAR LENS IMPLANT RIGHT;  Surgeon: Gio Mcmanus MD;  Location: [facility] OR;  Service: Ophthalmology;  Laterality: Right;    CATARACT EXTRACTION W/ INTRAOCULAR LENS IMPLANT Left 08/02/2021    Procedure: CATARACT PHACO EXTRACTION WITH INTRAOCULAR LENS IMPLANT LEFT complex with malyugin ring;  Surgeon: Gio Mcmanus MD;  Location: [facility] OR;  Service: Ophthalmology;  Laterality: Left;    COLONOSCOPY      HERNIA REPAIR Bilateral 1983    ROTATOR CUFF REPAIR Right 2006    TOTAL HIP ARTHROPLASTY Left 11/2018    TOTAL HIP ARTHROPLASTY Right 3/2/2023    Procedure: ANTERIOR TOTAL HIP ARTHROPLASTY EXETER CEMENTED RIGHT;  Surgeon: Richie Rogers MD;  Location: [facility] OR;  Service: Orthopedics;  Laterality: Right;        ALLERGIES: Plaquenil [hydroxychloroquine]       PHYSICAL EXAM:    VITAL SIGNS:   Vitals:    09/17/23 0833   BP:    Pulse:    Resp:    Temp: 97.6 °F (36.4 °C)   SpO2:       CONSTITUTIONAL: Awake, well appearing, nontoxic   HENT: Atraumatic, normocephalic, oral mucosa moist, airway patent. Nares patent without drainage.  Some dried blood around the left nostril, no active bleeding, no blood in the oropharynx.  External ears normal.   EYES: Conjunctivae clear, EOMI, PERRL   NECK: Trachea midline, nontender, supple   CARDIOVASCULAR: Normal heart rate, Normal rhythm.  PULMONARY/CHEST: Normal work of breathing. Clear to auscultation, no rhonchi, wheezes, or rales.  ABDOMINAL: Nondistended, soft, nontender, no rebound or guarding.  NEUROLOGIC: Nonfocal, moves all four extremities, no gross sensory or motor deficits.   EXTREMITIES: No clubbing, cyanosis, or edema   SKIN: Warm, Dry, No erythema, No rash       ED COURSE / MEDICAL DECISION MAKING:     Ted Saldivar is a 83 y.o. male who presents to the emergency department for evaluation of nosebleed.  Well-developed, well-nourished elderly man in no distress with exam as above.  His vital signs are normal.  His oxygen saturation is normal on room air at 100%.  We will give some Afrin nose spray and monitor.  Disposition pending though anticipate discharge home.    Differential diagnosis includes nosebleed among other etiologies.    No further bleeding.  Will discharge home as planned.    Final diagnoses:   Nosebleed        Francisco Nova MD  09/17/23 0927

## 2023-09-17 NOTE — ED PROVIDER NOTES
Subjective  History of Present Illness:    Chief Complaint: Nosebleed, chest pain  History of Present Illness: This is an 83-year-old male patient comes into the ED today complaining of nosebleed, chest pain.  Patient states he was seen in the emergency room earlier today for nosebleed was given Afrin nasal spray and sent home.  Patient states he started developing chest pain and came back in for assessment.      Nurses Notes reviewed and agree, including vitals, allergies, social history and prior medical history.       Allergies:    Plaquenil [hydroxychloroquine]      Past Surgical History:   Procedure Laterality Date    CATARACT EXTRACTION W/ INTRAOCULAR LENS IMPLANT Right 06/21/2021    Procedure: CATARACT PHACO EXTRACTION WITH INTRAOCULAR LENS IMPLANT RIGHT;  Surgeon: Gio Mcmanus MD;  Location: [facility] OR;  Service: Ophthalmology;  Laterality: Right;    CATARACT EXTRACTION W/ INTRAOCULAR LENS IMPLANT Left 08/02/2021    Procedure: CATARACT PHACO EXTRACTION WITH INTRAOCULAR LENS IMPLANT LEFT complex with malyugin ring;  Surgeon: Gio Mcmanus MD;  Location: [facility] OR;  Service: Ophthalmology;  Laterality: Left;    COLONOSCOPY      HERNIA REPAIR Bilateral 1983    ROTATOR CUFF REPAIR Right 2006    TOTAL HIP ARTHROPLASTY Left 11/2018    TOTAL HIP ARTHROPLASTY Right 3/2/2023    Procedure: ANTERIOR TOTAL HIP ARTHROPLASTY EXETER CEMENTED RIGHT;  Surgeon: Richie Rogers MD;  Location: [facility] OR;  Service: Orthopedics;  Laterality: Right;         Social History     Socioeconomic History    Marital status:    Tobacco Use    Smoking status: Never    Smokeless tobacco: Never   Vaping Use    Vaping Use: Never used   Substance and Sexual Activity    Alcohol use: Not Currently    Drug use: No    Sexual activity: Defer         Family History   Problem Relation Age of Onset    Cancer Mother         lung    Cancer Sister         leukemia       REVIEW OF SYSTEMS: All systems reviewed and not pertinent unless  noted.    Review of Systems   HENT:  Positive for nosebleeds.    Cardiovascular:  Positive for chest pain.   All other systems reviewed and are negative.    Objective    Physical Exam  Vitals and nursing note reviewed.   Constitutional:       Appearance: Normal appearance.   HENT:      Head: Normocephalic and atraumatic.      Nose:      Right Nostril: Septal hematoma present.      Left Nostril: Septal hematoma present.      Right Turbinates: Swollen.      Left Turbinates: Swollen.      Comments: Dried blood bilateral naris  Eyes:      Extraocular Movements: Extraocular movements intact.      Pupils: Pupils are equal, round, and reactive to light.   Cardiovascular:      Rate and Rhythm: Normal rate and regular rhythm.      Pulses: Normal pulses.      Heart sounds: Normal heart sounds.   Pulmonary:      Effort: Pulmonary effort is normal.      Breath sounds: Normal breath sounds.   Abdominal:      General: Bowel sounds are normal.      Palpations: Abdomen is soft.   Musculoskeletal:         General: Normal range of motion.      Cervical back: Normal range of motion and neck supple.   Skin:     General: Skin is warm and dry.      Capillary Refill: Capillary refill takes less than 2 seconds.   Neurological:      Mental Status: He is alert.      GCS: GCS eye subscore is 4. GCS verbal subscore is 5. GCS motor subscore is 6.      Sensory: Sensation is intact.      Motor: Motor function is intact.   Psychiatric:         Attention and Perception: Attention and perception normal.         Mood and Affect: Mood and affect normal.         Speech: Speech normal.     HEART Score for Major Cardiac Events - MDCalc  Calculated on Sep 17 2023 1:20 PM  3 points -> Low Score (0-3 points) Risk of MACE of 0.9-1.7%.    Procedures    ED Course:    ED Course as of 09/17/23 1500   Sun Sep 17, 2023   1300 EKG interpreted by me: Sinus rhythm, normal rate, first-degree AV block, incomplete left bundle branch, no obvious acute ST elevations, this  is an abnormal EKG, similar to previous [MP]      ED Course User Index  [MP] Francisco Nova MD       Lab Results (last 24 hours)       Procedure Component Value Units Date/Time    CBC & Differential [[id number]]  (Abnormal) Collected: 09/17/23 1248    Specimen: Blood Updated: 09/17/23 1255    Narrative:      The following orders were created for panel order CBC & Differential.  Procedure                               Abnormality         Status                     ---------                               -----------         ------                     CBC Auto Differential[[id number]]        Abnormal            Final result                 Please view results for these tests on the individual orders.    Comprehensive Metabolic Panel [[id number]]  (Abnormal) Collected: 09/17/23 1248    Specimen: Blood Updated: 09/17/23 1314     Glucose 180 mg/dL      Comment: Glucose >180, Hemoglobin A1C recommended.        BUN 33 mg/dL      Creatinine 1.11 mg/dL      Sodium 137 mmol/L      Potassium 4.6 mmol/L      Chloride 103 mmol/L      CO2 23.2 mmol/L      Calcium 9.2 mg/dL      Total Protein 7.0 g/dL      Albumin 4.4 g/dL      ALT (SGPT) 14 U/L      AST (SGOT) 21 U/L      Alkaline Phosphatase 70 U/L      Total Bilirubin 0.8 mg/dL      Globulin 2.6 gm/dL      A/G Ratio 1.7 g/dL      BUN/Creatinine Ratio 29.7     Anion Gap 10.8 mmol/L      eGFR 66.3 mL/min/1.73     Narrative:      GFR Normal >60  Chronic Kidney Disease <60  Kidney Failure <15    The GFR formula is only valid for adults with stable renal function between ages 18 and 70.    High Sensitivity Troponin T [[id number]]  (Abnormal) Collected: 09/17/23 1248    Specimen: Blood Updated: 09/17/23 1316     HS Troponin T 32 ng/L     Narrative:      High Sensitive Troponin T Reference Range:  <10.0 ng/L- Negative Female for AMI  <15.0 ng/L- Negative Male for AMI  >=10 - Abnormal Female indicating possible myocardial injury.  >=15 - Abnormal Male indicating possible myocardial  injury.   Clinicians would have to utilize clinical acumen, EKG, Troponin, and serial changes to determine if it is an Acute Myocardial Infarction or myocardial injury due to an underlying chronic condition.         CBC Auto Differential [[id number]]  (Abnormal) Collected: 09/17/23 1248    Specimen: Blood Updated: 09/17/23 1255     WBC 8.52 10*3/mm3      RBC 3.87 10*6/mm3      Hemoglobin 12.7 g/dL      Hematocrit 37.9 %      MCV 97.9 fL      MCH 32.8 pg      MCHC 33.5 g/dL      RDW 17.1 %      RDW-SD 61.1 fl      MPV 10.3 fL      Platelets 268 10*3/mm3      Neutrophil % 74.3 %      Lymphocyte % 18.2 %      Monocyte % 5.9 %      Eosinophil % 0.5 %      Basophil % 0.5 %      Immature Grans % 0.6 %      Neutrophils, Absolute 6.34 10*3/mm3      Lymphocytes, Absolute 1.55 10*3/mm3      Monocytes, Absolute 0.50 10*3/mm3      Eosinophils, Absolute 0.04 10*3/mm3      Basophils, Absolute 0.04 10*3/mm3      Immature Grans, Absolute 0.05 10*3/mm3      nRBC 0.0 /100 WBC     High Sensitivity Troponin T 2Hr [[id number]]  (Abnormal) Collected: 09/17/23 1428    Specimen: Blood Updated: 09/17/23 1454     HS Troponin T 30 ng/L      Troponin T Delta -2 ng/L     Narrative:      High Sensitive Troponin T Reference Range:  <10.0 ng/L- Negative Female for AMI  <15.0 ng/L- Negative Male for AMI  >=10 - Abnormal Female indicating possible myocardial injury.  >=15 - Abnormal Male indicating possible myocardial injury.   Clinicians would have to utilize clinical acumen, EKG, Troponin, and serial changes to determine if it is an Acute Myocardial Infarction or myocardial injury due to an underlying chronic condition.                  XR Chest 1 View    Result Date: 9/17/2023  PROCEDURE: XR CHEST 1 VW-  HISTORY: Chest Pain Triage Protocol  COMPARISON: December 19, 2022.  FINDINGS: The heart is normal in size. The lungs are clear. The mediastinum is unremarkable. There is no pneumothorax.  There are no acute osseous abnormalities. Aortic mural  calcifications noted.      Impression: No acute cardiopulmonary process.      This report was signed and finalized on 9/17/2023 1:13 PM by Rossana Cole MD.        Medical Decision Making  83-year-old male patient comes into the ED today complaining of chest pain, with nosebleeds.    DDX: includes but is not limited to: NSTEMI, STEMI, chest pain unspecified, other    Problems Addressed:  Chest pain, unspecified type: acute illness or injury  Epistaxis: acute illness or injury    Amount and/or Complexity of Data Reviewed  Independent Historian: caregiver     Details: Spoke with attending physician about previous assessment on same day  External Data Reviewed: labs, radiology, ECG and notes.     Details: Reviewed labs, radiology from previous encounters  Labs: ordered. Decision-making details documented in ED Course.     Details: I have personally reviewed and documented all results  Radiology: ordered. Decision-making details documented in ED Course.     Details: I have personally reviewed and documented all results  ECG/medicine tests: ordered. Decision-making details documented in ED Course.     Details: I have personally reviewed and documented all results  Discussion of management or test interpretation with external provider(s): Discussed assessment, treatment and plan with ER attending    Risk  Risk Details: Patient has been diagnosed with chest pain unspecified, epistaxis and will be discharged home.  Patient requested to follow-up with primary care provider within the next 7 days for reevaluation.                  Final diagnoses:   Chest pain, unspecified type   Epistaxis          Nael Urbano, ED  09/17/23 1500

## 2023-09-28 ENCOUNTER — OFFICE VISIT (OUTPATIENT)
Dept: INTERNAL MEDICINE | Facility: CLINIC | Age: 83
End: 2023-09-28
Payer: MEDICARE

## 2023-09-28 VITALS
WEIGHT: 194 LBS | HEART RATE: 59 BPM | SYSTOLIC BLOOD PRESSURE: 132 MMHG | TEMPERATURE: 97.8 F | RESPIRATION RATE: 16 BRPM | OXYGEN SATURATION: 98 % | DIASTOLIC BLOOD PRESSURE: 75 MMHG | HEIGHT: 70 IN | BODY MASS INDEX: 27.77 KG/M2

## 2023-09-28 DIAGNOSIS — E78.2 MIXED HYPERLIPIDEMIA: ICD-10-CM

## 2023-09-28 DIAGNOSIS — R04.0 EPISTAXIS: ICD-10-CM

## 2023-09-28 DIAGNOSIS — I10 BENIGN ESSENTIAL HYPERTENSION: Primary | ICD-10-CM

## 2023-09-28 DIAGNOSIS — Z23 NEED FOR INFLUENZA VACCINATION: ICD-10-CM

## 2023-09-28 DIAGNOSIS — R07.9 CHEST PAIN, UNSPECIFIED TYPE: ICD-10-CM

## 2023-09-28 LAB
ALBUMIN SERPL-MCNC: 4.4 G/DL (ref 3.5–5.2)
ALBUMIN/GLOB SERPL: 1.8 G/DL
ALP SERPL-CCNC: 77 U/L (ref 39–117)
ALT SERPL-CCNC: 12 U/L (ref 1–41)
AST SERPL-CCNC: 19 U/L (ref 1–40)
BASOPHILS # BLD AUTO: 0.08 10*3/MM3 (ref 0–0.2)
BASOPHILS NFR BLD AUTO: 1 % (ref 0–1.5)
BILIRUB SERPL-MCNC: 0.6 MG/DL (ref 0–1.2)
BUN SERPL-MCNC: 24 MG/DL (ref 8–23)
BUN/CREAT SERPL: 23.3 (ref 7–25)
CALCIUM SERPL-MCNC: 9.8 MG/DL (ref 8.6–10.5)
CHLORIDE SERPL-SCNC: 106 MMOL/L (ref 98–107)
CHOLEST SERPL-MCNC: 164 MG/DL (ref 0–200)
CO2 SERPL-SCNC: 26.6 MMOL/L (ref 22–29)
CREAT SERPL-MCNC: 1.03 MG/DL (ref 0.76–1.27)
EGFRCR SERPLBLD CKD-EPI 2021: 72.5 ML/MIN/1.73
EOSINOPHIL # BLD AUTO: 0.12 10*3/MM3 (ref 0–0.4)
EOSINOPHIL NFR BLD AUTO: 1.4 % (ref 0.3–6.2)
ERYTHROCYTE [DISTWIDTH] IN BLOOD BY AUTOMATED COUNT: 15.1 % (ref 12.3–15.4)
GLOBULIN SER CALC-MCNC: 2.4 GM/DL
GLUCOSE SERPL-MCNC: 96 MG/DL (ref 65–99)
HCT VFR BLD AUTO: 36.9 % (ref 37.5–51)
HDLC SERPL-MCNC: 55 MG/DL (ref 40–60)
HGB BLD-MCNC: 12.3 G/DL (ref 13–17.7)
IMM GRANULOCYTES # BLD AUTO: 0.06 10*3/MM3 (ref 0–0.05)
IMM GRANULOCYTES NFR BLD AUTO: 0.7 % (ref 0–0.5)
LDLC SERPL CALC-MCNC: 96 MG/DL (ref 0–100)
LYMPHOCYTES # BLD AUTO: 1.09 10*3/MM3 (ref 0.7–3.1)
LYMPHOCYTES NFR BLD AUTO: 13.1 % (ref 19.6–45.3)
MCH RBC QN AUTO: 33.4 PG (ref 26.6–33)
MCHC RBC AUTO-ENTMCNC: 33.3 G/DL (ref 31.5–35.7)
MCV RBC AUTO: 100.3 FL (ref 79–97)
MONOCYTES # BLD AUTO: 0.93 10*3/MM3 (ref 0.1–0.9)
MONOCYTES NFR BLD AUTO: 11.2 % (ref 5–12)
NEUTROPHILS # BLD AUTO: 6.03 10*3/MM3 (ref 1.7–7)
NEUTROPHILS NFR BLD AUTO: 72.6 % (ref 42.7–76)
NRBC BLD AUTO-RTO: 0 /100 WBC (ref 0–0.2)
PLATELET # BLD AUTO: 250 10*3/MM3 (ref 140–450)
POTASSIUM SERPL-SCNC: 5 MMOL/L (ref 3.5–5.2)
PROT SERPL-MCNC: 6.8 G/DL (ref 6–8.5)
RBC # BLD AUTO: 3.68 10*6/MM3 (ref 4.14–5.8)
SODIUM SERPL-SCNC: 142 MMOL/L (ref 136–145)
TRIGL SERPL-MCNC: 67 MG/DL (ref 0–150)
VLDLC SERPL CALC-MCNC: 13 MG/DL (ref 5–40)
WBC # BLD AUTO: 8.31 10*3/MM3 (ref 3.4–10.8)

## 2023-09-28 PROCEDURE — 3078F DIAST BP <80 MM HG: CPT | Performed by: INTERNAL MEDICINE

## 2023-09-28 PROCEDURE — 1159F MED LIST DOCD IN RCRD: CPT | Performed by: INTERNAL MEDICINE

## 2023-09-28 PROCEDURE — 3075F SYST BP GE 130 - 139MM HG: CPT | Performed by: INTERNAL MEDICINE

## 2023-09-28 PROCEDURE — G0008 ADMIN INFLUENZA VIRUS VAC: HCPCS | Performed by: INTERNAL MEDICINE

## 2023-09-28 PROCEDURE — 90662 IIV NO PRSV INCREASED AG IM: CPT | Performed by: INTERNAL MEDICINE

## 2023-09-28 PROCEDURE — 1160F RVW MEDS BY RX/DR IN RCRD: CPT | Performed by: INTERNAL MEDICINE

## 2023-09-28 PROCEDURE — 99214 OFFICE O/P EST MOD 30 MIN: CPT | Performed by: INTERNAL MEDICINE

## 2023-09-28 RX ORDER — SIMVASTATIN 10 MG
10 TABLET ORAL
Qty: 90 TABLET | Refills: 1 | Status: SHIPPED | OUTPATIENT
Start: 2023-09-28

## 2023-09-28 RX ORDER — LOSARTAN POTASSIUM 50 MG/1
50 TABLET ORAL DAILY
Qty: 90 TABLET | Refills: 1 | Status: SHIPPED | OUTPATIENT
Start: 2023-09-28

## 2023-09-28 NOTE — PROGRESS NOTES
"Chief Complaint  Hypertension, Nose Bleed, and Hyperlipidemia    Subjective        Ted Saldivar presents to [facility]  HPI: Patient is here to follow up on the blood pressure  The patient is taking the blood pressure medications as prescribed and has had no side effects. The patient is also here to follow up on the cholesterol  and is  due to get lab work done .  The patient also needs refills on medications and flu vaccine.  Patient states he was seen in the ER for nosebleed twice and was stressed out and complained of chest pain at his second ER visit, his ER records have been reviewed and medications updated and reconciled, he has not had any further episodes  Hyperlipidemia   Pertinent negatives include no chest pain or shortness of breath.   Hypertension   Pertinent negatives include no chest pain, palpitations or shortness of breath.    Hypertension    Nose Bleed     Hyperlipidemia      Objective   Vital Signs:  /75   Pulse 59   Temp 97.8 °F (36.6 °C)   Resp 16   Ht 177.8 cm (70\")   Wt 88 kg (194 lb)   SpO2 98%   BMI 27.84 kg/m²   Estimated body mass index is 27.84 kg/m² as calculated from the following:    Height as of this encounter: 177.8 cm (70\").    Weight as of this encounter: 88 kg (194 lb).               Physical Exam  Vitals and nursing note reviewed.   Constitutional:       General: He is not in acute distress.     Appearance: Normal appearance. He is not diaphoretic.   HENT:      Head: Normocephalic and atraumatic.      Right Ear: External ear normal.      Left Ear: External ear normal.      Nose: Nose normal.   Eyes:      Extraocular Movements: Extraocular movements intact.      Conjunctiva/sclera: Conjunctivae normal.   Neck:      Trachea: Trachea normal.   Cardiovascular:      Rate and Rhythm: Normal rate and regular rhythm.      Heart sounds: Normal heart sounds.   Pulmonary:      Effort: Pulmonary effort is normal. No respiratory distress. "   Abdominal:      General: Abdomen is flat.   Musculoskeletal:      Cervical back: Neck supple.      Comments: Moves all limbs   Skin:     General: Skin is warm and dry.      Findings: No erythema.   Neurological:      Mental Status: He is alert and oriented to person, place, and time.      Comments: No gross motor or sensory deficits      Result Review :  The following data was reviewed by: Sheila Natarajan MD on 09/28/2023:  Common labs          3/3/2023    05:05 6/8/2023    10:53 9/17/2023    12:48   Common Labs   Glucose 128  90  180    BUN 30  24  33    Creatinine 1.02  0.98  1.11    Sodium 137  142  137    Potassium 5.5  5.2  4.6    Chloride 105  108  103    Calcium 8.8  9.9  9.2    Total Protein  6.7     Albumin  4.2  4.4    Total Bilirubin  0.5  0.8    Alkaline Phosphatase  75  70    AST (SGOT)  16  21    ALT (SGPT)  15  14    WBC  7.74  8.52    Hemoglobin 10.2  12.8  12.7    Hematocrit 31.3  39.1  37.9    Platelets  245  268    Total Cholesterol  174     Triglycerides  53     HDL Cholesterol  59     LDL Cholesterol   104                    Assessment and Plan   Diagnoses and all orders for this visit:    1. Benign essential hypertension (Primary)  -     losartan (COZAAR) 50 MG tablet; Take 1 tablet by mouth Daily.  Dispense: 90 tablet; Refill: 1    2. Mixed hyperlipidemia  -     CBC & Differential  -     Comprehensive Metabolic Panel  -     Lipid Panel  -     simvastatin (ZOCOR) 10 MG tablet; Take 1 tablet by mouth every night at bedtime.  Dispense: 90 tablet; Refill: 1    3. Epistaxis  -     Ambulatory Referral to ENT (Otolaryngology)    4. Chest pain, unspecified type  -     Ambulatory Referral to Cardiology    5. Need for influenza vaccination  -     Fluzone High-Dose 65+yrs ([id number])       Plan:  1.  Benign essential hypertension: Will continue current medication, low-sodium diet advised, Counseled to regularly check BP at home with goal averaging <130/80.   2.mixed hyperlipidemia: will obtain   fasting  CMP and lipid panel.  Diet and exercise counseled,  Will continue current medications  3.  Epistaxis: Refer patient to ENT  4.  Chest pain: Refer patient to cardiology  5.  Need for flu vaccine: Given today and well-tolerated     I spent 30 minutes caring for Ted on this date of service. This time includes time spent by me in the following activities:preparing for the visit, reviewing tests, performing a medically appropriate examination and/or evaluation , counseling and educating the patient/family/caregiver, ordering medications, tests, or procedures, and documenting information in the medical record  Follow Up   Return in about 4 months (around 1/15/2024).  Patient was given instructions and counseling regarding his condition or for health maintenance advice. Please see specific information pulled into the AVS if appropriate.

## 2024-01-23 ENCOUNTER — TELEMEDICINE (OUTPATIENT)
Dept: INTERNAL MEDICINE | Facility: CLINIC | Age: 84
End: 2024-01-23
Payer: MEDICARE

## 2024-01-23 DIAGNOSIS — E78.2 MIXED HYPERLIPIDEMIA: ICD-10-CM

## 2024-01-23 DIAGNOSIS — I10 BENIGN ESSENTIAL HYPERTENSION: Primary | ICD-10-CM

## 2024-01-23 DIAGNOSIS — M06.9 RHEUMATOID ARTHRITIS INVOLVING MULTIPLE SITES, UNSPECIFIED WHETHER RHEUMATOID FACTOR PRESENT: ICD-10-CM

## 2024-01-23 PROCEDURE — 1160F RVW MEDS BY RX/DR IN RCRD: CPT | Performed by: INTERNAL MEDICINE

## 2024-01-23 PROCEDURE — 1159F MED LIST DOCD IN RCRD: CPT | Performed by: INTERNAL MEDICINE

## 2024-01-23 PROCEDURE — 99214 OFFICE O/P EST MOD 30 MIN: CPT | Performed by: INTERNAL MEDICINE

## 2024-01-23 RX ORDER — LOSARTAN POTASSIUM 50 MG/1
50 TABLET ORAL DAILY
Qty: 90 TABLET | Refills: 1 | Status: SHIPPED | OUTPATIENT
Start: 2024-01-23

## 2024-01-23 RX ORDER — PREDNISONE 2.5 MG/1
2.5 TABLET ORAL DAILY
COMMUNITY
Start: 2023-11-30

## 2024-01-23 RX ORDER — SIMVASTATIN 10 MG
10 TABLET ORAL
Qty: 90 TABLET | Refills: 1 | Status: SHIPPED | OUTPATIENT
Start: 2024-01-23

## 2024-01-23 NOTE — PROGRESS NOTES
"You have chosen to receive care through a video  visit. Do you consent to use a video visit for your medical care today? Yes  Parties active with  visit via My Chart  Physician: Sheila Grossman MD  Nurse: Misbah Kenney CMA  Patient : lory saldivar  Location of Provider : [place], [place]  Location of Patient : at home  Patient   was seen via telehealth using real-time video conferencing technology  This service was conducted via  Video Visit    Chief Complaint  Hypertension, Hyperlipidemia, and Joint Pain    Subjective        Lory Saldivar presents to [facility]  History of Present Illness  HPI: Patient is followind up on the blood pressure  The patient is taking the blood pressure medications as prescribed and has had no side effects. The patient is also  following up on the cholesterol and is  due to get lab work done .  The patient also needs refills on medications . He follows with rheumatology in [place]  for RA and is currently off immunomodulators and has been started on low dose prednisone  Hyperlipidemia   Pertinent negatives include no chest pain or shortness of breath.   Hypertension   Pertinent negatives include no chest pain, palpitations or shortness of breath.    During today's visit, I reviewed the documented allergies, medications, chief complaint, and pertinent vitals.  I have confirmed with the patient that there have been no changes since this information was discussed with my clinical team member.      Objective   Vital Signs: afebrile   Estimated body mass index is 27.84 kg/m² as calculated from the following:    Height as of 9/28/23: 177.8 cm (70\").    Weight as of 9/28/23: 88 kg (194 lb).         Physical Exam   All vitals recorded within this visit are reported by the patient.  Physical Examination  Vitals and nursing note reviewed  General appearance: Normocephalic and nontraumatic  HEENT: External inspection of eyes, ears and nose appears benign, " hearing appears intact  Neck: Neck appears supple, trachea in midline, thyroid appears not enlarged  Respiratory: Respiratory effort appears normal  Musculoskeletal: Moving all limbs  Range of motion of visible joints appears within normal  CNS: No gross motor or sensory deficits  No gross cranial nerve deficits  No tremors  Psychiatry: Alert and oriented x3  Memory appears intact  Mood and affect appears normal  Insight appears normal    Result Review :      Common labs          6/8/2023    10:53 9/17/2023    12:48 9/28/2023    10:48   Common Labs   Glucose 90  180  96    BUN 24  33  24    Creatinine 0.98  1.11  1.03    Sodium 142  137  142    Potassium 5.2  4.6  5.0    Chloride 108  103  106    Calcium 9.9  9.2  9.8    Total Protein 6.7   6.8    Albumin 4.2  4.4  4.4    Total Bilirubin 0.5  0.8  0.6    Alkaline Phosphatase 75  70  77    AST (SGOT) 16  21  19    ALT (SGPT) 15  14  12    WBC 7.74  8.52  8.31    Hemoglobin 12.8  12.7  12.3    Hematocrit 39.1  37.9  36.9    Platelets 245  268  250    Total Cholesterol 174   164    Triglycerides 53   67    HDL Cholesterol 59   55    LDL Cholesterol  104   96                   Assessment and Plan     Diagnoses and all orders for this visit:    1. Benign essential hypertension (Primary)  -     losartan (COZAAR) 50 MG tablet; Take 1 tablet by mouth Daily.  Dispense: 90 tablet; Refill: 1    2. Mixed hyperlipidemia  -     simvastatin (ZOCOR) 10 MG tablet; Take 1 tablet by mouth every night at bedtime.  Dispense: 90 tablet; Refill: 1  -     CBC & Differential  -     Comprehensive Metabolic Panel  -     Lipid Panel    3. Rheumatoid arthritis involving multiple sites, unspecified whether rheumatoid factor present    Plan:  1.  Benign essential hypertension: Will continue current medication, low-sodium diet advised, Counseled to regularly check BP at home with goal averaging <130/80.   2.mixed hyperlipidemia: will obtain   fasting CMP and lipid panel.  Diet and exercise  counseled,  Will continue current medications  3.  Rheumatoid arthritis : to continue current medication- prednisone, per rheumatology  This was a video enabled telemedicine encounter.         Follow Up     Return in about 2 months (around 4/4/2024) for Medicare Wellness.  Patient was given instructions and counseling regarding his condition or for health maintenance advice. Please see specific information pulled into the AVS if appropriate.

## 2024-02-02 LAB
ALBUMIN SERPL-MCNC: 4.4 G/DL (ref 3.5–5.2)
ALBUMIN/GLOB SERPL: 1.8 G/DL
ALP SERPL-CCNC: 79 U/L (ref 39–117)
ALT SERPL-CCNC: 16 U/L (ref 1–41)
AST SERPL-CCNC: 22 U/L (ref 1–40)
BASOPHILS # BLD AUTO: 0.09 10*3/MM3 (ref 0–0.2)
BASOPHILS NFR BLD AUTO: 1.1 % (ref 0–1.5)
BILIRUB SERPL-MCNC: 0.5 MG/DL (ref 0–1.2)
BUN SERPL-MCNC: 28 MG/DL (ref 8–23)
BUN/CREAT SERPL: 21.7 (ref 7–25)
CALCIUM SERPL-MCNC: 9.4 MG/DL (ref 8.6–10.5)
CHLORIDE SERPL-SCNC: 103 MMOL/L (ref 98–107)
CHOLEST SERPL-MCNC: 186 MG/DL (ref 0–200)
CO2 SERPL-SCNC: 28.1 MMOL/L (ref 22–29)
CREAT SERPL-MCNC: 1.29 MG/DL (ref 0.76–1.27)
EGFRCR SERPLBLD CKD-EPI 2021: 55.4 ML/MIN/1.73
EOSINOPHIL # BLD AUTO: 0.26 10*3/MM3 (ref 0–0.4)
EOSINOPHIL NFR BLD AUTO: 3.3 % (ref 0.3–6.2)
ERYTHROCYTE [DISTWIDTH] IN BLOOD BY AUTOMATED COUNT: 13 % (ref 12.3–15.4)
GLOBULIN SER CALC-MCNC: 2.5 GM/DL
GLUCOSE SERPL-MCNC: 84 MG/DL (ref 65–99)
HCT VFR BLD AUTO: 44.4 % (ref 37.5–51)
HDLC SERPL-MCNC: 60 MG/DL (ref 40–60)
HGB BLD-MCNC: 15.1 G/DL (ref 13–17.7)
IMM GRANULOCYTES # BLD AUTO: 0.04 10*3/MM3 (ref 0–0.05)
IMM GRANULOCYTES NFR BLD AUTO: 0.5 % (ref 0–0.5)
LDLC SERPL CALC-MCNC: 112 MG/DL (ref 0–100)
LYMPHOCYTES # BLD AUTO: 2.3 10*3/MM3 (ref 0.7–3.1)
LYMPHOCYTES NFR BLD AUTO: 28.8 % (ref 19.6–45.3)
MCH RBC QN AUTO: 33.3 PG (ref 26.6–33)
MCHC RBC AUTO-ENTMCNC: 34 G/DL (ref 31.5–35.7)
MCV RBC AUTO: 97.8 FL (ref 79–97)
MONOCYTES # BLD AUTO: 0.73 10*3/MM3 (ref 0.1–0.9)
MONOCYTES NFR BLD AUTO: 9.1 % (ref 5–12)
NEUTROPHILS # BLD AUTO: 4.56 10*3/MM3 (ref 1.7–7)
NEUTROPHILS NFR BLD AUTO: 57.2 % (ref 42.7–76)
NRBC BLD AUTO-RTO: 0 /100 WBC (ref 0–0.2)
PLATELET # BLD AUTO: 238 10*3/MM3 (ref 140–450)
POTASSIUM SERPL-SCNC: 4.5 MMOL/L (ref 3.5–5.2)
PROT SERPL-MCNC: 6.9 G/DL (ref 6–8.5)
RBC # BLD AUTO: 4.54 10*6/MM3 (ref 4.14–5.8)
SODIUM SERPL-SCNC: 140 MMOL/L (ref 136–145)
TRIGL SERPL-MCNC: 73 MG/DL (ref 0–150)
VLDLC SERPL CALC-MCNC: 14 MG/DL (ref 5–40)
WBC # BLD AUTO: 7.98 10*3/MM3 (ref 3.4–10.8)

## 2024-04-23 DIAGNOSIS — I10 BENIGN ESSENTIAL HYPERTENSION: ICD-10-CM

## 2024-04-23 RX ORDER — LOSARTAN POTASSIUM 50 MG/1
50 TABLET ORAL DAILY
Qty: 90 TABLET | Refills: 3 | OUTPATIENT
Start: 2024-04-23

## 2024-04-23 NOTE — TELEPHONE ENCOUNTER
Spoke with Akiko per verbal agreement and informed her that there was 1 more refill left on this medication

## 2024-04-24 ENCOUNTER — OFFICE VISIT (OUTPATIENT)
Dept: INTERNAL MEDICINE | Facility: CLINIC | Age: 84
End: 2024-04-24
Payer: MEDICARE

## 2024-04-24 VITALS
TEMPERATURE: 97.8 F | BODY MASS INDEX: 28.2 KG/M2 | RESPIRATION RATE: 18 BRPM | HEIGHT: 70 IN | SYSTOLIC BLOOD PRESSURE: 144 MMHG | OXYGEN SATURATION: 98 % | WEIGHT: 197 LBS | DIASTOLIC BLOOD PRESSURE: 81 MMHG | HEART RATE: 59 BPM

## 2024-04-24 DIAGNOSIS — Z00.00 MEDICARE ANNUAL WELLNESS VISIT, SUBSEQUENT: Primary | ICD-10-CM

## 2024-04-24 DIAGNOSIS — E78.2 MIXED HYPERLIPIDEMIA: ICD-10-CM

## 2024-04-24 DIAGNOSIS — I10 BENIGN ESSENTIAL HYPERTENSION: ICD-10-CM

## 2024-04-24 DIAGNOSIS — Z23 NEED FOR VACCINATION: ICD-10-CM

## 2024-04-24 PROCEDURE — 3079F DIAST BP 80-89 MM HG: CPT | Performed by: INTERNAL MEDICINE

## 2024-04-24 PROCEDURE — 1159F MED LIST DOCD IN RCRD: CPT | Performed by: INTERNAL MEDICINE

## 2024-04-24 PROCEDURE — 90677 PCV20 VACCINE IM: CPT | Performed by: INTERNAL MEDICINE

## 2024-04-24 PROCEDURE — 1160F RVW MEDS BY RX/DR IN RCRD: CPT | Performed by: INTERNAL MEDICINE

## 2024-04-24 PROCEDURE — G0009 ADMIN PNEUMOCOCCAL VACCINE: HCPCS | Performed by: INTERNAL MEDICINE

## 2024-04-24 PROCEDURE — G0439 PPPS, SUBSEQ VISIT: HCPCS | Performed by: INTERNAL MEDICINE

## 2024-04-24 PROCEDURE — 99397 PER PM REEVAL EST PAT 65+ YR: CPT | Performed by: INTERNAL MEDICINE

## 2024-04-24 PROCEDURE — 3077F SYST BP >= 140 MM HG: CPT | Performed by: INTERNAL MEDICINE

## 2024-04-24 NOTE — PATIENT INSTRUCTIONS
Advance Care Planning and Advance Directives     You make decisions on a daily basis - decisions about where you want to live, your career, your home, your life. Perhaps one of the most important decisions you face is your choice for future medical care. Take time to talk with your family and your healthcare team and start planning today.  Advance Care Planning is a process that can help you:  Understand possible future healthcare decisions in light of your own experiences  Reflect on those decision in light of your goals and values  Discuss your decisions with those closest to you and the healthcare professionals that care for you  Make a plan by creating a document that reflects your wishes    Surrogate Decision Maker  In the event of a medical emergency, which has left you unable to communicate or to make your own decisions, you would need someone to make decisions for you.  It is important to discuss your preferences for medical treatment with this person while you are in good health.     Qualities of a surrogate decision maker:  Willing to take on this role and responsibility  Knows what you want for future medical care  Willing to follow your wishes even if they don't agree with them  Able to make difficult medical decisions under stressful circumstances    Advance Directives  These are legal documents you can create that will guide your healthcare team and decision maker(s) when needed. These documents can be stored in the electronic medical record.    Living Will - a legal document to guide your care if you have a terminal condition or a serious illness and are unable to communicate. States vary by statute in document names/types, but most forms may include one or more of the following:        -  Directions regarding life-prolonging treatments        -  Directions regarding artificially provided nutrition/hydration        -  Choosing a healthcare decision maker        -  Direction regarding organ/tissue  donation    Durable Power of  for Healthcare - this document names an -in-fact to make medical decisions for you, but it may also allow this person to make personal and financial decisions for you. Please seek the advice of an  if you need this type of document.    **Advance Directives are not required and no one may discriminate against you if you do not sign one.    Medical Orders  Many states allow specific forms/orders signed by your physician to record your wishes for medical treatment in your current state of health. This form, signed in personal communication with your physician, addresses resuscitation and other medical interventions that you may or may not want.      For more information or to schedule a time with a [facility] Advance Care Planning Facilitator contact: [employer] or call [phone] and someone will contact you directly.    Medicare Wellness  Personal Prevention Plan of Service     Date of Office Visit:    Encounter Provider:  Sheila Natarajan MD  Place of Service:  [facility]  Patient Name: Ted Saldivar  :  1940    As part of the Medicare Wellness portion of your visit today, we are providing you with this personalized preventive plan of services (PPPS). This plan is based upon recommendations of the [place] Preventive Services Task Force (USPSTF) and the Advisory Committee on Immunization Practices (ACIP).    This lists the preventive care services that should be considered, and provides dates of when you are due. Items listed as completed are up-to-date and do not require any further intervention.    Health Maintenance   Topic Date Due   • ANNUAL WELLNESS VISIT  2023   • BMI FOLLOWUP  2024   • ZOSTER VACCINE (2 of 2) 2024 (Originally 2016)   • INFLUENZA VACCINE  2024   • LIPID PANEL  2025   • TDAP/TD VACCINES (2 - Td or Tdap) 2029   • COVID-19 Vaccine  Completed   •  RSV Vaccine - Adults  Completed   • Pneumococcal Vaccine 65+  Completed       No orders of the defined types were placed in this encounter.      No follow-ups on file.

## 2024-04-24 NOTE — PROGRESS NOTES
The ABCs of the Annual Wellness Visit  Subsequent Medicare Wellness Visit    Chief Complaint   Patient presents with    Medicare Wellness-subsequent    Hypertension    Hyperlipidemia       Subjective      Ted Saldivar is a 83 y.o. male who presents for a Subsequent Medicare Wellness Visit and physical ,patient is here to follow up on the blood pressure  The patient is taking the blood pressure medications as prescribed and has had no side effects. The patient is also here to follow up on the cholesterol and  had lab work done .  The patient also needs Prevnar 20.   Hyperlipidemia   Pertinent negatives include no chest pain or shortness of breath.   Hypertension   Pertinent negatives include no chest pain, palpitations or shortness of breath.      The following portions of the patient's history were reviewed and   updated as appropriate: allergies, current medications, past family history, past medical history, past social history, past surgical history, and problem list.    Compared to one year ago, the patient feels his physical   health is the same.    Compared to one year ago, the patient feels his mental   health is the same.    Recent Hospitalizations:  He was not admitted to the hospital during the last year.       Current Medical Providers:  Patient Care Team:  Sheila Natarajan MD as PCP - General (Internal Medicine)  Felix Duffy MD as Consulting Physician (Otolaryngology)  Wytat Fischer APRN as Nurse Practitioner (Rheumatology)    Outpatient Medications Prior to Visit   Medication Sig Dispense Refill    albuterol sulfate  (90 Base) MCG/ACT inhaler Inhale 1 puff Every 6 (Six) Hours As Needed for Wheezing. 18 g 3    docusate sodium (Colace) 100 MG capsule Take 1 capsule by mouth 2 (Two) Times a Day.      folic acid (FOLVITE) 1 MG tablet Take 1 tablet by mouth Daily. 90 tablet 1    losartan (COZAAR) 50 MG tablet Take 1 tablet by mouth Daily. 90 tablet 1    meclizine (ANTIVERT) 25 MG tablet Take 1  "tablet by mouth 3 (Three) Times a Day As Needed for dizziness. 90 tablet 1    ondansetron (Zofran) 4 MG tablet Take 1 tablet by mouth Every 8 (Eight) Hours As Needed for Nausea or Vomiting.      predniSONE (DELTASONE) 2.5 MG tablet Take 1 tablet by mouth Daily.      sildenafil (Viagra) 100 MG tablet Take 1 tablet by mouth At Night As Needed for Erectile Dysfunction. 10 tablet 3    simvastatin (ZOCOR) 10 MG tablet Take 1 tablet by mouth every night at bedtime. 90 tablet 1     No facility-administered medications prior to visit.       No opioid medication identified on active medication list. I have reviewed chart for other potential  high risk medication/s and harmful drug interactions in the elderly.        Aspirin is not on active medication list.  Aspirin use is not indicated based on review of current medical condition/s. Risk of harm outweighs potential benefits.  .    Patient Active Problem List   Diagnosis    Neoplasm of skin    Generalized abdominal pain    Acute infective gastroenteritis    Essential hypertension    Dyslipidemia    Nuclear sclerotic cataract of right eye    Arthritis of right hip    Status post total hip replacement, right    Postoperative pain    Rheumatoid arthritis involving multiple sites     Advance Care Planning   Advance Care Planning     Advance Directive is not on file.  ACP discussion was held with the patient during this visit. Patient has an advance directive (not in EMR), copy requested.     Objective    Vitals:    04/24/24 1500   BP: 144/81   Pulse: 59   Resp: 18   Temp: 97.8 °F (36.6 °C)   SpO2: 98%   Weight: 89.4 kg (197 lb)   Height: 177.8 cm (70\")   PainSc: 0-No pain     Estimated body mass index is 28.27 kg/m² as calculated from the following:    Height as of this encounter: 177.8 cm (70\").    Weight as of this encounter: 89.4 kg (197 lb).    BMI is >= 25 and <30. (Overweight) The following options were offered after discussion;: weight loss educational material (shared in " after visit summary), exercise counseling/recommendations, and nutrition counseling/recommendations    All vitals recorded within this visit are reported by the patient.  Physical Examination  Vitals and nursing note reviewed  General appearance: Normocephalic and nontraumatic  HEENT: External inspection of eyes, ears and nose appears benign, hearing appears intact  Neck: Neck appears supple, trachea in midline, thyroid appears not enlarged  Respiratory: Respiratory effort appears normal  Musculoskeletal: Moving all limbs  Range of motion of visible joints appears within normal  CNS: No gross motor or sensory deficits  No gross cranial nerve deficits  No tremors  Psychiatry: Alert and oriented x3  Memory appears intact  Mood and affect appears normal  Insight appears normal    Does the patient have evidence of cognitive impairment?   No    Lab Results   Component Value Date    CHLPL 186 2024    TRIG 73 2024    HDL 60 2024     (H) 2024    VLDL 14 2024          HEALTH RISK ASSESSMENT    Smoking Status:  Social History     Tobacco Use   Smoking Status Never   Smokeless Tobacco Never     Alcohol Consumption:  Social History     Substance and Sexual Activity   Alcohol Use Not Currently     Fall Risk Screen:    STEADI Fall Risk Assessment was completed, and patient is at LOW risk for falls.Assessment completed on:2024    Depression Screenin/24/2024     3:17 PM   PHQ-2/PHQ-9 Depression Screening   Little Interest or Pleasure in Doing Things 0-->not at all   Feeling Down, Depressed or Hopeless 0-->not at all   PHQ-9: Brief Depression Severity Measure Score 0       Health Habits and Functional and Cognitive Screenin/24/2024     3:16 PM   Functional & Cognitive Status   Do you have difficulty preparing food and eating? No   Do you have difficulty bathing yourself, getting dressed or grooming yourself? No   Do you have difficulty using the toilet? No   Do you have  difficulty moving around from place to place? No   Do you have trouble with steps or getting out of a bed or a chair? No   Current Diet Well Balanced Diet   Dental Exam Up to date   Eye Exam Up to date   Exercise (times per week) 5 times per week   Current Exercises Include Other;House Cleaning;Walking;Yard Work;Gardening   Do you need help using the phone?  No   Are you deaf or do you have serious difficulty hearing?  No   Do you need help to go to places out of walking distance? No   Do you need help shopping? No   Do you need help preparing meals?  No   Do you need help with housework?  No   Do you need help with laundry? No   Do you need help taking your medications? No   Do you need help managing money? No   Do you ever drive or ride in a car without wearing a seat belt? No   Have you felt unusual stress, anger or loneliness in the last month? No   Who do you live with? Spouse   If you need help, do you have trouble finding someone available to you? No   Have you been bothered in the last four weeks by sexual problems? No   Do you have difficulty concentrating, remembering or making decisions? No       Age-appropriate Screening Schedule:  Refer to the list below for future screening recommendations based on patient's age, sex and/or medical conditions. Orders for these recommended tests are listed in the plan section. The patient has been provided with a written plan.    Health Maintenance   Topic Date Due    COVID-19 Vaccine (7 - 2023-24 season) 02/09/2024    ZOSTER VACCINE (2 of 2) 09/30/2024 (Originally 11/26/2016)    INFLUENZA VACCINE  08/01/2024    LIPID PANEL  02/02/2025    ANNUAL WELLNESS VISIT  04/24/2025    BMI FOLLOWUP  04/24/2025    TDAP/TD VACCINES (2 - Td or Tdap) 07/27/2029    RSV Vaccine - Adults  Completed    Pneumococcal Vaccine 65+  Completed                  CMS Preventative Services Quick Reference  Risk Factors Identified During Encounter:    Immunizations Discussed/Encouraged: Prevnar 20  (Pneumococcal 20-valent conjugate)    The above risks/problems have been discussed with the patient.  Pertinent information has been shared with the patient in the After Visit Summary.    Diagnoses and all orders for this visit:    1. Medicare annual wellness visit, subsequent (Primary)    2. Benign essential hypertension    3. Mixed hyperlipidemia    4. Need for vaccination  -     Pneumococcal Conjugate Vaccine 20-Valent (PCV20)    Plan:  1.physical: Physical exam conducted today, reviewed fasting lab work,   Impression: healthy adult Patient. Currently, patient eats a healthy diet. Screening lab work includes glucose, lipid profile and complete blood count . The risks and benefits of immunizations were discussed and immunizations are up to date. Advice and education were given regarding nutrition and aerobic exercise. Patient discussion: discussed with the patient.  Annual Wellness Visit  , physical with preventive exam as well as age and risk appropriate counseling completed.   Advance Directive Planning: paperwork and instructions were given to the patient.   Patient Discussion: plan discussed with the patient, follow-up visit needed in one year. Medication Review and medication list updated  2.  Benign essential hypertension: Will continue current medication, low-sodium diet advised, Counseled to regularly check BP at home with goal averaging <130/80.   3.mixed hyperlipidemia:  reviewed  fasting CMP and lipid panel.  Diet and exercise counseled,  Will continue current medications  4.  Need for Prevnar 20: Given today and well-tolerated      Follow Up:   Next Medicare Wellness visit to be scheduled in 1 year.      An After Visit Summary and PPPS were made available to the patient.

## (undated) DEVICE — SEAL FEM EXETER 1/2/MOON DISP

## (undated) DEVICE — CEMENT MIXING SYSTEM WITH MIS FEMORAL BREAKAWAY NOZZLE: Brand: REVOLUTION

## (undated) DEVICE — HP CONCL INTREPID COAX I/A CRV .3MM

## (undated) DEVICE — FEMORAL CANAL TIP, IRRIGATION/SUCTION

## (undated) DEVICE — SCRB SURG BACTOSHIELD CHG 4PCT 4OZ

## (undated) DEVICE — CANN IRR/INJ AIR ANT CHAMBER 6MM BEND 27G

## (undated) DEVICE — SUT MNCRYL PLS ANTIB UD 3/0 PS2 27IN

## (undated) DEVICE — NDL HYPO ECLPS SFTY 22G 1 1/2IN

## (undated) DEVICE — GLV SURG PREMIERPRO MIC LTX PF SZ7 BRN

## (undated) DEVICE — Device

## (undated) DEVICE — MARKER,SKIN,W/RULER,DUAL,STOP: Brand: MEDLINE

## (undated) DEVICE — SPK10295 ORTHOPEDIC FRACTURE KIT: Brand: SPK10295 ORTHOPEDIC FRACTURE KIT

## (undated) DEVICE — SYR LL W/SCALE/MARK 3ML STRL

## (undated) DEVICE — PREMIUM DRY TRAY LF: Brand: MEDLINE INDUSTRIES, INC.

## (undated) DEVICE — SYR CONTRL PRESS/LO FIX/M/LL W/THMB/RNG 10ML

## (undated) DEVICE — SYR LUERLOK 5CC

## (undated) DEVICE — GLV SURG SENSICARE PI LF PF 7.0

## (undated) DEVICE — TRY EPID SFTY 18G 3.5IN 1T7680

## (undated) DEVICE — ADHS SKIN PREMIERPRO EXOFIN TOPICAL HI/VISC .5ML

## (undated) DEVICE — SOL ISO/ALC RUB 70PCT 4OZ

## (undated) DEVICE — CLEARCUT® HP2 SLIT KNIFE INTREPID MICRO-COAXIAL SYSTEM 2.4 DB: Brand: CLEARCUT®; INTREPID

## (undated) DEVICE — POST OP EYE CARE KIT: Brand: MEDLINE

## (undated) DEVICE — 15 DEG. MICROKNIFE - 3MM: Brand: SHARPOINT

## (undated) DEVICE — GLV SURG SENSICARE PI ORTHO SZ8.5 LF STRL

## (undated) DEVICE — Device: Brand: MALYUGIN RING SYSTEM 6.25MM

## (undated) DEVICE — SOL IRRIG H2O 1000ML STRL

## (undated) DEVICE — UNDERGLV SURG BIOGEL INDICAT PI SZ8.5 BLU

## (undated) DEVICE — GLV SURG NEOPRN SENSICARE SZ8

## (undated) DEVICE — STRYKER PERFORMANCE SERIES SAGITTAL BLADE: Brand: STRYKER PERFORMANCE SERIES

## (undated) DEVICE — HANDPIECE SET WITH HIGH FLOW TIP AND SUCTION TUBE: Brand: INTERPULSE

## (undated) DEVICE — PK MAJ SHLDR SPLT 10

## (undated) DEVICE — TBG PENCL TELESCP MEGADYNE SMOKE EVAC 10FT

## (undated) DEVICE — 6617 IOBAN II PATIENT ISOLATION DRAPE 5/BX,4BX/CS: Brand: STERI-DRAPE™ IOBAN™ 2

## (undated) DEVICE — BNDG ELAS CO-FLEX SLF ADHR 4IN5YD LF STRL

## (undated) DEVICE — DISPOSABLE ORTHOPAEDIC PROTECTOR: Brand: ALEXIS ® ORTHOPAEDIC PROTECTOR

## (undated) DEVICE — ANTIBACTERIAL UNDYED BRAIDED (POLYGLACTIN 910), SYNTHETIC ABSORBABLE SUTURE: Brand: COATED VICRYL

## (undated) DEVICE — PULLOVER TOGA, 2X LARGE: Brand: FLYTE, SURGICOOL

## (undated) DEVICE — SOL HYDROGEN PEROX 3PCT 4OZ

## (undated) DEVICE — NDL HYPO ECLPS SFTY 18G 1 1/2IN

## (undated) DEVICE — BIT DRL TRIDENT2 TRITANIUM 3.3X40MM DISP

## (undated) DEVICE — DRSNG WND STRIP OPTIFOAM AG SUPRABS A/MIC 4X8IN STRL

## (undated) DEVICE — GLV SURG SENSICARE PI MIC PF SZ9 LF STRL

## (undated) DEVICE — BLANKT WARM UPPR/BDY ARM/OUT 57X196CM

## (undated) DEVICE — C-ARM DRAPE: Brand: DEROYAL

## (undated) DEVICE — SHEET,DRAPE,53X77,STERILE: Brand: MEDLINE

## (undated) DEVICE — GLV SURG PREMIERPRO MIC LTX PF SZ6.5 BRN

## (undated) DEVICE — GLV SURG SENSICARE PI LF PF 6.5

## (undated) DEVICE — STERILE PVP: Brand: MEDLINE INDUSTRIES, INC.

## (undated) DEVICE — PATIENT RETURN ELECTRODE, SINGLE-USE, CONTACT QUALITY MONITORING, ADULT, WITH 9FT CORD, FOR PATIENTS WEIGING OVER 33LBS. (15KG): Brand: MEGADYNE

## (undated) DEVICE — GLV SURG PREMIERPRO MIC LTX PF SZ8 BRN

## (undated) DEVICE — STRIP,CLOSURE,WOUND,MEDI-STRIP,1/2X4: Brand: MEDLINE

## (undated) DEVICE — GLV SURG PREMIERPRO MIC LTX PF SZ7.5 BRN